# Patient Record
Sex: FEMALE | Race: OTHER | NOT HISPANIC OR LATINO
[De-identification: names, ages, dates, MRNs, and addresses within clinical notes are randomized per-mention and may not be internally consistent; named-entity substitution may affect disease eponyms.]

---

## 2017-06-08 ENCOUNTER — APPOINTMENT (OUTPATIENT)
Dept: PLASTIC SURGERY | Facility: CLINIC | Age: 47
End: 2017-06-08

## 2017-06-22 ENCOUNTER — FORM ENCOUNTER (OUTPATIENT)
Age: 47
End: 2017-06-22

## 2017-06-23 ENCOUNTER — LABORATORY RESULT (OUTPATIENT)
Age: 47
End: 2017-06-23

## 2017-06-23 ENCOUNTER — OUTPATIENT (OUTPATIENT)
Dept: OUTPATIENT SERVICES | Facility: HOSPITAL | Age: 47
LOS: 1 days | End: 2017-06-23
Payer: COMMERCIAL

## 2017-06-23 ENCOUNTER — APPOINTMENT (OUTPATIENT)
Dept: INTERNAL MEDICINE | Facility: CLINIC | Age: 47
End: 2017-06-23

## 2017-06-23 VITALS
WEIGHT: 201 LBS | TEMPERATURE: 98 F | OXYGEN SATURATION: 98 % | DIASTOLIC BLOOD PRESSURE: 80 MMHG | SYSTOLIC BLOOD PRESSURE: 110 MMHG | HEIGHT: 64 IN | HEART RATE: 114 BPM | BODY MASS INDEX: 34.31 KG/M2

## 2017-06-23 DIAGNOSIS — Z01.818 ENCOUNTER FOR OTHER PREPROCEDURAL EXAMINATION: ICD-10-CM

## 2017-06-23 LAB
ALBUMIN SERPL ELPH-MCNC: 4.6 G/DL — SIGNIFICANT CHANGE UP (ref 3.3–5)
ALP SERPL-CCNC: 66 U/L — SIGNIFICANT CHANGE UP (ref 40–120)
ALT FLD-CCNC: 109 U/L — HIGH (ref 10–45)
ANION GAP SERPL CALC-SCNC: 14 MMOL/L — SIGNIFICANT CHANGE UP (ref 5–17)
APPEARANCE UR: CLEAR — SIGNIFICANT CHANGE UP
APTT BLD: 29.1 SEC — SIGNIFICANT CHANGE UP (ref 27.5–37.4)
AST SERPL-CCNC: 68 U/L — HIGH (ref 10–40)
BACTERIA # UR AUTO: PRESENT /HPF
BASOPHILS NFR BLD AUTO: 0.5 % — SIGNIFICANT CHANGE UP (ref 0–2)
BILIRUB SERPL-MCNC: 1.1 MG/DL — SIGNIFICANT CHANGE UP (ref 0.2–1.2)
BILIRUB UR-MCNC: NEGATIVE — SIGNIFICANT CHANGE UP
BUN SERPL-MCNC: 12 MG/DL — SIGNIFICANT CHANGE UP (ref 7–23)
CALCIUM SERPL-MCNC: 10.1 MG/DL — SIGNIFICANT CHANGE UP (ref 8.4–10.5)
CHLORIDE SERPL-SCNC: 102 MMOL/L — SIGNIFICANT CHANGE UP (ref 96–108)
CO2 SERPL-SCNC: 25 MMOL/L — SIGNIFICANT CHANGE UP (ref 22–31)
COLOR SPEC: YELLOW — SIGNIFICANT CHANGE UP
CREAT SERPL-MCNC: 0.6 MG/DL — SIGNIFICANT CHANGE UP (ref 0.5–1.3)
DIFF PNL FLD: (no result)
EOSINOPHIL NFR BLD AUTO: 2.1 % — SIGNIFICANT CHANGE UP (ref 0–6)
EPI CELLS # UR: SIGNIFICANT CHANGE UP /HPF
GLUCOSE SERPL-MCNC: 87 MG/DL — SIGNIFICANT CHANGE UP (ref 70–99)
GLUCOSE UR QL: NEGATIVE — SIGNIFICANT CHANGE UP
HCT VFR BLD CALC: 40.6 % — SIGNIFICANT CHANGE UP (ref 34.5–45)
HGB BLD-MCNC: 13.8 G/DL — SIGNIFICANT CHANGE UP (ref 11.5–15.5)
INR BLD: 1.04 — SIGNIFICANT CHANGE UP (ref 0.88–1.16)
KETONES UR-MCNC: NEGATIVE — SIGNIFICANT CHANGE UP
LEUKOCYTE ESTERASE UR-ACNC: (no result)
LYMPHOCYTES # BLD AUTO: 25.2 % — SIGNIFICANT CHANGE UP (ref 13–44)
MCHC RBC-ENTMCNC: 29.1 PG — SIGNIFICANT CHANGE UP (ref 27–34)
MCHC RBC-ENTMCNC: 34 G/DL — SIGNIFICANT CHANGE UP (ref 32–36)
MCV RBC AUTO: 85.7 FL — SIGNIFICANT CHANGE UP (ref 80–100)
MONOCYTES NFR BLD AUTO: 6.5 % — SIGNIFICANT CHANGE UP (ref 2–14)
NEUTROPHILS NFR BLD AUTO: 65.7 % — SIGNIFICANT CHANGE UP (ref 43–77)
NITRITE UR-MCNC: NEGATIVE — SIGNIFICANT CHANGE UP
PH UR: 5 — SIGNIFICANT CHANGE UP (ref 5–8)
PLATELET # BLD AUTO: 218 K/UL — SIGNIFICANT CHANGE UP (ref 150–400)
POTASSIUM SERPL-MCNC: 3.7 MMOL/L — SIGNIFICANT CHANGE UP (ref 3.5–5.3)
POTASSIUM SERPL-SCNC: 3.7 MMOL/L — SIGNIFICANT CHANGE UP (ref 3.5–5.3)
PROT SERPL-MCNC: 7 G/DL — SIGNIFICANT CHANGE UP (ref 6–8.3)
PROT UR-MCNC: NEGATIVE MG/DL — SIGNIFICANT CHANGE UP
PROTHROM AB SERPL-ACNC: 11.5 SEC — SIGNIFICANT CHANGE UP (ref 9.8–12.7)
RBC # BLD: 4.74 M/UL — SIGNIFICANT CHANGE UP (ref 3.8–5.2)
RBC # FLD: 12.4 % — SIGNIFICANT CHANGE UP (ref 10.3–16.9)
RBC CASTS # UR COMP ASSIST: < 5 /HPF — SIGNIFICANT CHANGE UP
SODIUM SERPL-SCNC: 141 MMOL/L — SIGNIFICANT CHANGE UP (ref 135–145)
SP GR SPEC: >=1.03 — SIGNIFICANT CHANGE UP (ref 1–1.03)
UROBILINOGEN FLD QL: 0.2 E.U./DL — SIGNIFICANT CHANGE UP
WBC # BLD: 6.3 K/UL — SIGNIFICANT CHANGE UP (ref 3.8–10.5)
WBC # FLD AUTO: 6.3 K/UL — SIGNIFICANT CHANGE UP (ref 3.8–10.5)
WBC UR QL: < 5 /HPF — SIGNIFICANT CHANGE UP

## 2017-06-23 PROCEDURE — 71020: CPT | Mod: 26

## 2017-06-23 PROCEDURE — 71046 X-RAY EXAM CHEST 2 VIEWS: CPT

## 2017-06-23 PROCEDURE — 85730 THROMBOPLASTIN TIME PARTIAL: CPT

## 2017-06-23 PROCEDURE — 85610 PROTHROMBIN TIME: CPT

## 2017-06-23 PROCEDURE — 87086 URINE CULTURE/COLONY COUNT: CPT

## 2017-06-23 PROCEDURE — 80053 COMPREHEN METABOLIC PANEL: CPT

## 2017-06-23 PROCEDURE — 85025 COMPLETE CBC W/AUTO DIFF WBC: CPT

## 2017-06-23 PROCEDURE — 81001 URINALYSIS AUTO W/SCOPE: CPT

## 2017-06-25 LAB
CULTURE RESULTS: NO GROWTH — SIGNIFICANT CHANGE UP
SPECIMEN SOURCE: SIGNIFICANT CHANGE UP

## 2017-06-26 LAB
APPEARANCE: ABNORMAL
BILIRUBIN URINE: NEGATIVE
BLOOD URINE: NEGATIVE
COLOR: YELLOW
GLUCOSE QUALITATIVE U: NORMAL MG/DL
KETONES URINE: NEGATIVE
LEUKOCYTE ESTERASE URINE: ABNORMAL
NITRITE URINE: NEGATIVE
PH URINE: 5.5
PROTEIN URINE: NEGATIVE MG/DL
SPECIFIC GRAVITY URINE: 1.02
UROBILINOGEN URINE: 1 MG/DL

## 2017-06-28 VITALS
OXYGEN SATURATION: 98 % | RESPIRATION RATE: 16 BRPM | DIASTOLIC BLOOD PRESSURE: 54 MMHG | HEART RATE: 86 BPM | HEIGHT: 63 IN | SYSTOLIC BLOOD PRESSURE: 103 MMHG | WEIGHT: 192.02 LBS | TEMPERATURE: 99 F

## 2017-06-28 RX ORDER — OXYCODONE HYDROCHLORIDE 5 MG/1
20 TABLET ORAL ONCE
Qty: 0 | Refills: 0 | Status: DISCONTINUED | OUTPATIENT
Start: 2017-06-29 | End: 2017-06-29

## 2017-06-28 RX ORDER — CELECOXIB 200 MG/1
400 CAPSULE ORAL ONCE
Qty: 0 | Refills: 0 | Status: COMPLETED | OUTPATIENT
Start: 2017-06-29 | End: 2017-06-29

## 2017-06-28 NOTE — H&P ADULT - NSHPLABSRESULTS_GEN_ALL_CORE
Preop CBC, BMP, PT/PTT/INR, UA (+UA, negative culture) - WNL per medical clearance   Preop EKG - WNL per medical clearance   Preop CXR - no acute pathology - WNL per medical clearance

## 2017-06-28 NOTE — H&P ADULT - PMH
Arthritis of right knee    Breast cancer  right Arthritis of right knee    Breast cancer  right  H/O psoriasis

## 2017-06-28 NOTE — H&P ADULT - PROBLEM SELECTOR PLAN 1
Admit to Orthopaedic Service.  Presents today for elective right TKR  Pt medically stable and cleared for procedure today by Dr. Toro

## 2017-06-28 NOTE — H&P ADULT - HISTORY OF PRESENT ILLNESS
47F c/o right knee pain x       Present for elective right total knee replacement today 47F c/o right knee pain x chronic      Present for elective right total knee replacement today. Independent ambulator.  No complaints. Uses one crutch to assist ambulation.

## 2017-06-28 NOTE — H&P ADULT - NSHPPHYSICALEXAM_GEN_ALL_CORE
MSK: Decreased ROM secondary to pain, right knee      Remainder of exam per medical clearance note MSK: Decreased ROM secondary to pain, right knee  ehl, tib ant, GS 5/5 b/l LE  DP palpable b/l  Calf soft, compressible, NT b/l  gross sensation intact b/l LE  psoriasis lesion right leg      Remainder of exam per medical clearance note

## 2017-06-29 ENCOUNTER — INPATIENT (INPATIENT)
Facility: HOSPITAL | Age: 47
LOS: 1 days | Discharge: HOME HEALTH | DRG: 470 | End: 2017-07-01
Attending: ORTHOPAEDIC SURGERY | Admitting: ORTHOPAEDIC SURGERY
Payer: COMMERCIAL

## 2017-06-29 ENCOUNTER — RESULT REVIEW (OUTPATIENT)
Age: 47
End: 2017-06-29

## 2017-06-29 DIAGNOSIS — Z90.722 ACQUIRED ABSENCE OF OVARIES, BILATERAL: Chronic | ICD-10-CM

## 2017-06-29 DIAGNOSIS — M17.11 UNILATERAL PRIMARY OSTEOARTHRITIS, RIGHT KNEE: ICD-10-CM

## 2017-06-29 DIAGNOSIS — Z90.10 ACQUIRED ABSENCE OF UNSPECIFIED BREAST AND NIPPLE: Chronic | ICD-10-CM

## 2017-06-29 LAB
HCT VFR BLD CALC: 33.9 % — LOW (ref 34.5–45)
HGB BLD-MCNC: 11.6 G/DL — SIGNIFICANT CHANGE UP (ref 11.5–15.5)
MCHC RBC-ENTMCNC: 29.6 PG — SIGNIFICANT CHANGE UP (ref 27–34)
MCHC RBC-ENTMCNC: 34.2 G/DL — SIGNIFICANT CHANGE UP (ref 32–36)
MCV RBC AUTO: 86.5 FL — SIGNIFICANT CHANGE UP (ref 80–100)
PLATELET # BLD AUTO: 173 K/UL — SIGNIFICANT CHANGE UP (ref 150–400)
RBC # BLD: 3.92 M/UL — SIGNIFICANT CHANGE UP (ref 3.8–5.2)
RBC # FLD: 12.7 % — SIGNIFICANT CHANGE UP (ref 10.3–16.9)
WBC # BLD: 5.9 K/UL — SIGNIFICANT CHANGE UP (ref 3.8–10.5)
WBC # FLD AUTO: 5.9 K/UL — SIGNIFICANT CHANGE UP (ref 3.8–10.5)

## 2017-06-29 PROCEDURE — 73560 X-RAY EXAM OF KNEE 1 OR 2: CPT | Mod: 26,RT

## 2017-06-29 RX ORDER — ASPIRIN/CALCIUM CARB/MAGNESIUM 324 MG
325 TABLET ORAL
Qty: 0 | Refills: 0 | Status: DISCONTINUED | OUTPATIENT
Start: 2017-06-29 | End: 2017-07-01

## 2017-06-29 RX ORDER — BUPIVACAINE 13.3 MG/ML
20 INJECTION, SUSPENSION, LIPOSOMAL INFILTRATION ONCE
Qty: 0 | Refills: 0 | Status: DISCONTINUED | OUTPATIENT
Start: 2017-06-29 | End: 2017-07-01

## 2017-06-29 RX ORDER — SENNA PLUS 8.6 MG/1
2 TABLET ORAL AT BEDTIME
Qty: 0 | Refills: 0 | Status: DISCONTINUED | OUTPATIENT
Start: 2017-06-29 | End: 2017-07-01

## 2017-06-29 RX ORDER — OXYCODONE HYDROCHLORIDE 5 MG/1
10 TABLET ORAL EVERY 4 HOURS
Qty: 0 | Refills: 0 | Status: DISCONTINUED | OUTPATIENT
Start: 2017-06-29 | End: 2017-07-01

## 2017-06-29 RX ORDER — POLYETHYLENE GLYCOL 3350 17 G/17G
17 POWDER, FOR SOLUTION ORAL DAILY
Qty: 0 | Refills: 0 | Status: DISCONTINUED | OUTPATIENT
Start: 2017-06-29 | End: 2017-07-01

## 2017-06-29 RX ORDER — VANCOMYCIN HCL 1 G
1250 VIAL (EA) INTRAVENOUS ONCE
Qty: 0 | Refills: 0 | Status: COMPLETED | OUTPATIENT
Start: 2017-06-29 | End: 2017-06-29

## 2017-06-29 RX ORDER — ACETAMINOPHEN 500 MG
650 TABLET ORAL EVERY 6 HOURS
Qty: 0 | Refills: 0 | Status: DISCONTINUED | OUTPATIENT
Start: 2017-06-29 | End: 2017-07-01

## 2017-06-29 RX ORDER — DOCUSATE SODIUM 100 MG
100 CAPSULE ORAL THREE TIMES A DAY
Qty: 0 | Refills: 0 | Status: DISCONTINUED | OUTPATIENT
Start: 2017-06-29 | End: 2017-07-01

## 2017-06-29 RX ORDER — MORPHINE SULFATE 50 MG/1
4 CAPSULE, EXTENDED RELEASE ORAL EVERY 4 HOURS
Qty: 0 | Refills: 0 | Status: DISCONTINUED | OUTPATIENT
Start: 2017-06-29 | End: 2017-06-29

## 2017-06-29 RX ORDER — MORPHINE SULFATE 50 MG/1
4 CAPSULE, EXTENDED RELEASE ORAL
Qty: 0 | Refills: 0 | Status: DISCONTINUED | OUTPATIENT
Start: 2017-06-29 | End: 2017-06-29

## 2017-06-29 RX ORDER — CELECOXIB 200 MG/1
200 CAPSULE ORAL
Qty: 0 | Refills: 0 | Status: DISCONTINUED | OUTPATIENT
Start: 2017-06-30 | End: 2017-07-01

## 2017-06-29 RX ORDER — KETOROLAC TROMETHAMINE 30 MG/ML
30 SYRINGE (ML) INJECTION EVERY 6 HOURS
Qty: 0 | Refills: 0 | Status: DISCONTINUED | OUTPATIENT
Start: 2017-06-29 | End: 2017-06-29

## 2017-06-29 RX ORDER — OXYCODONE HYDROCHLORIDE 5 MG/1
10 TABLET ORAL EVERY 12 HOURS
Qty: 0 | Refills: 0 | Status: DISCONTINUED | OUTPATIENT
Start: 2017-06-29 | End: 2017-07-01

## 2017-06-29 RX ORDER — ONDANSETRON 8 MG/1
4 TABLET, FILM COATED ORAL EVERY 6 HOURS
Qty: 0 | Refills: 0 | Status: DISCONTINUED | OUTPATIENT
Start: 2017-06-29 | End: 2017-07-01

## 2017-06-29 RX ORDER — ACETAMINOPHEN 500 MG
650 TABLET ORAL EVERY 6 HOURS
Qty: 0 | Refills: 0 | Status: DISCONTINUED | OUTPATIENT
Start: 2017-06-29 | End: 2017-06-30

## 2017-06-29 RX ORDER — LETROZOLE 2.5 MG/1
2.5 TABLET, FILM COATED ORAL DAILY
Qty: 0 | Refills: 0 | Status: DISCONTINUED | OUTPATIENT
Start: 2017-06-29 | End: 2017-06-29

## 2017-06-29 RX ORDER — MAGNESIUM HYDROXIDE 400 MG/1
30 TABLET, CHEWABLE ORAL DAILY
Qty: 0 | Refills: 0 | Status: DISCONTINUED | OUTPATIENT
Start: 2017-06-29 | End: 2017-07-01

## 2017-06-29 RX ORDER — HYDROMORPHONE HYDROCHLORIDE 2 MG/ML
0.5 INJECTION INTRAMUSCULAR; INTRAVENOUS; SUBCUTANEOUS EVERY 4 HOURS
Qty: 0 | Refills: 0 | Status: DISCONTINUED | OUTPATIENT
Start: 2017-06-29 | End: 2017-07-01

## 2017-06-29 RX ORDER — BENZOCAINE AND MENTHOL 5; 1 G/100ML; G/100ML
1 LIQUID ORAL THREE TIMES A DAY
Qty: 0 | Refills: 0 | Status: DISCONTINUED | OUTPATIENT
Start: 2017-06-29 | End: 2017-07-01

## 2017-06-29 RX ORDER — SODIUM CHLORIDE 9 MG/ML
1000 INJECTION, SOLUTION INTRAVENOUS
Qty: 0 | Refills: 0 | Status: DISCONTINUED | OUTPATIENT
Start: 2017-06-29 | End: 2017-07-01

## 2017-06-29 RX ORDER — OXYCODONE HYDROCHLORIDE 5 MG/1
5 TABLET ORAL EVERY 4 HOURS
Qty: 0 | Refills: 0 | Status: DISCONTINUED | OUTPATIENT
Start: 2017-06-29 | End: 2017-07-01

## 2017-06-29 RX ADMIN — OXYCODONE HYDROCHLORIDE 20 MILLIGRAM(S): 5 TABLET ORAL at 06:58

## 2017-06-29 RX ADMIN — Medication 100 MILLIGRAM(S): at 21:22

## 2017-06-29 RX ADMIN — POLYETHYLENE GLYCOL 3350 17 GRAM(S): 17 POWDER, FOR SOLUTION ORAL at 17:37

## 2017-06-29 RX ADMIN — OXYCODONE HYDROCHLORIDE 10 MILLIGRAM(S): 5 TABLET ORAL at 16:33

## 2017-06-29 RX ADMIN — HYDROMORPHONE HYDROCHLORIDE 0.5 MILLIGRAM(S): 2 INJECTION INTRAMUSCULAR; INTRAVENOUS; SUBCUTANEOUS at 23:41

## 2017-06-29 RX ADMIN — Medication 30 MILLIGRAM(S): at 23:41

## 2017-06-29 RX ADMIN — Medication 166.67 MILLIGRAM(S): at 18:28

## 2017-06-29 RX ADMIN — Medication 30 MILLIGRAM(S): at 17:29

## 2017-06-29 RX ADMIN — Medication 30 MILLIGRAM(S): at 12:29

## 2017-06-29 RX ADMIN — OXYCODONE HYDROCHLORIDE 10 MILLIGRAM(S): 5 TABLET ORAL at 22:17

## 2017-06-29 RX ADMIN — OXYCODONE HYDROCHLORIDE 10 MILLIGRAM(S): 5 TABLET ORAL at 21:22

## 2017-06-29 RX ADMIN — Medication 325 MILLIGRAM(S): at 17:29

## 2017-06-29 RX ADMIN — OXYCODONE HYDROCHLORIDE 10 MILLIGRAM(S): 5 TABLET ORAL at 16:03

## 2017-06-29 RX ADMIN — Medication 30 MILLIGRAM(S): at 17:45

## 2017-06-29 RX ADMIN — OXYCODONE HYDROCHLORIDE 10 MILLIGRAM(S): 5 TABLET ORAL at 17:29

## 2017-06-29 RX ADMIN — Medication 1 TABLET(S): at 17:37

## 2017-06-29 RX ADMIN — Medication 30 MILLIGRAM(S): at 23:26

## 2017-06-29 RX ADMIN — HYDROMORPHONE HYDROCHLORIDE 0.5 MILLIGRAM(S): 2 INJECTION INTRAMUSCULAR; INTRAVENOUS; SUBCUTANEOUS at 19:27

## 2017-06-29 RX ADMIN — HYDROMORPHONE HYDROCHLORIDE 0.5 MILLIGRAM(S): 2 INJECTION INTRAMUSCULAR; INTRAVENOUS; SUBCUTANEOUS at 23:27

## 2017-06-29 RX ADMIN — OXYCODONE HYDROCHLORIDE 10 MILLIGRAM(S): 5 TABLET ORAL at 17:45

## 2017-06-29 RX ADMIN — ONDANSETRON 4 MILLIGRAM(S): 8 TABLET, FILM COATED ORAL at 11:23

## 2017-06-29 RX ADMIN — CELECOXIB 400 MILLIGRAM(S): 200 CAPSULE ORAL at 06:58

## 2017-06-29 RX ADMIN — Medication 30 MILLIGRAM(S): at 13:30

## 2017-06-29 NOTE — PROGRESS NOTE ADULT - SUBJECTIVE AND OBJECTIVE BOX
Orthopaedics Post Op Check Note    Procedure: Right Total knee replacment  Surgeon:Dr. Saavedra    Pt comfortable, without complaints  Denies CP, SOB, N/V, numbness/tingling . Has voided    Vital Signs Last 24 Hrs  T(C): 36.7 (29 Jun 2017 11:15), Max: 36.9 (29 Jun 2017 10:20)  T(F): 98 (29 Jun 2017 11:15), Max: 98.4 (29 Jun 2017 10:20)  HR: 59 (29 Jun 2017 13:30) (56 - 82)  BP: 117/66 (29 Jun 2017 13:30) (108/62 - 130/70)  BP(mean): --  RR: 20 (29 Jun 2017 13:30) (6 - 20)  SpO2: 99% (29 Jun 2017 13:30) (95% - 100%)    Dressing C/D/I. Cold therapy intact  General: Pt Alert and oriented     Pulses: DP palpable b/l  Sensation: gross sensation intact b/l LE  Motor: ehl, tib ant, GS 5/5  Abd: soft, NT, ND    Other: calf soft, compressible b/l    Post Op labs:                              11.6   5.9   )-----------( 173      ( 29 Jun 2017 11:28 )             33.9       Post op XR: prosthesis intact    A/P: 47yFemale POD#0 s/p   - Stable  - Pain Control  - DVT ppx: ASA, SCDs, OOB/PT  - Tori op abx: VAnco  (pcn allergy)  - PT, WBS: WBAT  - F/U AM Labs  -IVF    Ortho Consult Pager: [phone]

## 2017-06-29 NOTE — PHYSICAL THERAPY INITIAL EVALUATION ADULT - CRITERIA FOR SKILLED THERAPEUTIC INTERVENTIONS
therapy frequency/anticipated discharge recommendation/anticipated equipment needs at discharge/impairments found/rehab potential

## 2017-06-30 ENCOUNTER — TRANSCRIPTION ENCOUNTER (OUTPATIENT)
Age: 47
End: 2017-06-30

## 2017-06-30 LAB
ANION GAP SERPL CALC-SCNC: 8 MMOL/L — SIGNIFICANT CHANGE UP (ref 5–17)
BUN SERPL-MCNC: 9 MG/DL — SIGNIFICANT CHANGE UP (ref 7–23)
CALCIUM SERPL-MCNC: 8.5 MG/DL — SIGNIFICANT CHANGE UP (ref 8.4–10.5)
CHLORIDE SERPL-SCNC: 104 MMOL/L — SIGNIFICANT CHANGE UP (ref 96–108)
CO2 SERPL-SCNC: 28 MMOL/L — SIGNIFICANT CHANGE UP (ref 22–31)
CREAT SERPL-MCNC: 0.7 MG/DL — SIGNIFICANT CHANGE UP (ref 0.5–1.3)
GLUCOSE SERPL-MCNC: 165 MG/DL — HIGH (ref 70–99)
HCT VFR BLD CALC: 32.9 % — LOW (ref 34.5–45)
HGB BLD-MCNC: 10.9 G/DL — LOW (ref 11.5–15.5)
MCHC RBC-ENTMCNC: 29.2 PG — SIGNIFICANT CHANGE UP (ref 27–34)
MCHC RBC-ENTMCNC: 33.1 G/DL — SIGNIFICANT CHANGE UP (ref 32–36)
MCV RBC AUTO: 88.2 FL — SIGNIFICANT CHANGE UP (ref 80–100)
PLATELET # BLD AUTO: 157 K/UL — SIGNIFICANT CHANGE UP (ref 150–400)
POTASSIUM SERPL-MCNC: 4.2 MMOL/L — SIGNIFICANT CHANGE UP (ref 3.5–5.3)
POTASSIUM SERPL-SCNC: 4.2 MMOL/L — SIGNIFICANT CHANGE UP (ref 3.5–5.3)
RBC # BLD: 3.73 M/UL — LOW (ref 3.8–5.2)
RBC # FLD: 12.8 % — SIGNIFICANT CHANGE UP (ref 10.3–16.9)
SODIUM SERPL-SCNC: 140 MMOL/L — SIGNIFICANT CHANGE UP (ref 135–145)
WBC # BLD: 5.7 K/UL — SIGNIFICANT CHANGE UP (ref 3.8–10.5)
WBC # FLD AUTO: 5.7 K/UL — SIGNIFICANT CHANGE UP (ref 3.8–10.5)

## 2017-06-30 RX ORDER — KETOROLAC TROMETHAMINE 30 MG/ML
30 SYRINGE (ML) INJECTION EVERY 6 HOURS
Qty: 0 | Refills: 0 | Status: DISCONTINUED | OUTPATIENT
Start: 2017-06-30 | End: 2017-07-01

## 2017-06-30 RX ORDER — DOCUSATE SODIUM 100 MG
1 CAPSULE ORAL
Qty: 0 | Refills: 0 | COMMUNITY
Start: 2017-06-30

## 2017-06-30 RX ORDER — LETROZOLE 2.5 MG/1
1 TABLET, FILM COATED ORAL
Qty: 0 | Refills: 0 | COMMUNITY

## 2017-06-30 RX ORDER — POLYETHYLENE GLYCOL 3350 17 G/17G
17 POWDER, FOR SOLUTION ORAL
Qty: 0 | Refills: 0 | COMMUNITY
Start: 2017-06-30

## 2017-06-30 RX ORDER — ASPIRIN/CALCIUM CARB/MAGNESIUM 324 MG
1 TABLET ORAL
Qty: 0 | Refills: 0 | COMMUNITY
Start: 2017-06-30

## 2017-06-30 RX ORDER — SENNA PLUS 8.6 MG/1
2 TABLET ORAL
Qty: 0 | Refills: 0 | COMMUNITY
Start: 2017-06-30

## 2017-06-30 RX ORDER — ACETAMINOPHEN 500 MG
975 TABLET ORAL EVERY 8 HOURS
Qty: 0 | Refills: 0 | Status: DISCONTINUED | OUTPATIENT
Start: 2017-06-30 | End: 2017-07-01

## 2017-06-30 RX ADMIN — HYDROMORPHONE HYDROCHLORIDE 0.5 MILLIGRAM(S): 2 INJECTION INTRAMUSCULAR; INTRAVENOUS; SUBCUTANEOUS at 09:44

## 2017-06-30 RX ADMIN — Medication 325 MILLIGRAM(S): at 17:02

## 2017-06-30 RX ADMIN — Medication 30 MILLIGRAM(S): at 17:18

## 2017-06-30 RX ADMIN — Medication 100 MILLIGRAM(S): at 21:39

## 2017-06-30 RX ADMIN — Medication 1 TABLET(S): at 11:16

## 2017-06-30 RX ADMIN — OXYCODONE HYDROCHLORIDE 10 MILLIGRAM(S): 5 TABLET ORAL at 18:03

## 2017-06-30 RX ADMIN — Medication 975 MILLIGRAM(S): at 10:19

## 2017-06-30 RX ADMIN — OXYCODONE HYDROCHLORIDE 10 MILLIGRAM(S): 5 TABLET ORAL at 22:23

## 2017-06-30 RX ADMIN — HYDROMORPHONE HYDROCHLORIDE 0.5 MILLIGRAM(S): 2 INJECTION INTRAMUSCULAR; INTRAVENOUS; SUBCUTANEOUS at 10:01

## 2017-06-30 RX ADMIN — CELECOXIB 200 MILLIGRAM(S): 200 CAPSULE ORAL at 09:49

## 2017-06-30 RX ADMIN — Medication 100 MILLIGRAM(S): at 05:17

## 2017-06-30 RX ADMIN — Medication 30 MILLIGRAM(S): at 10:34

## 2017-06-30 RX ADMIN — OXYCODONE HYDROCHLORIDE 10 MILLIGRAM(S): 5 TABLET ORAL at 08:55

## 2017-06-30 RX ADMIN — OXYCODONE HYDROCHLORIDE 10 MILLIGRAM(S): 5 TABLET ORAL at 20:00

## 2017-06-30 RX ADMIN — Medication 325 MILLIGRAM(S): at 05:17

## 2017-06-30 RX ADMIN — OXYCODONE HYDROCHLORIDE 10 MILLIGRAM(S): 5 TABLET ORAL at 13:44

## 2017-06-30 RX ADMIN — OXYCODONE HYDROCHLORIDE 10 MILLIGRAM(S): 5 TABLET ORAL at 21:39

## 2017-06-30 RX ADMIN — OXYCODONE HYDROCHLORIDE 10 MILLIGRAM(S): 5 TABLET ORAL at 17:02

## 2017-06-30 RX ADMIN — Medication 975 MILLIGRAM(S): at 22:23

## 2017-06-30 RX ADMIN — Medication 650 MILLIGRAM(S): at 05:17

## 2017-06-30 RX ADMIN — OXYCODONE HYDROCHLORIDE 10 MILLIGRAM(S): 5 TABLET ORAL at 19:02

## 2017-06-30 RX ADMIN — OXYCODONE HYDROCHLORIDE 10 MILLIGRAM(S): 5 TABLET ORAL at 05:43

## 2017-06-30 RX ADMIN — CELECOXIB 200 MILLIGRAM(S): 200 CAPSULE ORAL at 08:49

## 2017-06-30 RX ADMIN — OXYCODONE HYDROCHLORIDE 10 MILLIGRAM(S): 5 TABLET ORAL at 04:30

## 2017-06-30 RX ADMIN — OXYCODONE HYDROCHLORIDE 10 MILLIGRAM(S): 5 TABLET ORAL at 06:37

## 2017-06-30 RX ADMIN — OXYCODONE HYDROCHLORIDE 10 MILLIGRAM(S): 5 TABLET ORAL at 12:44

## 2017-06-30 RX ADMIN — OXYCODONE HYDROCHLORIDE 10 MILLIGRAM(S): 5 TABLET ORAL at 03:55

## 2017-06-30 RX ADMIN — Medication 30 MILLIGRAM(S): at 10:19

## 2017-06-30 RX ADMIN — Medication 100 MILLIGRAM(S): at 11:16

## 2017-06-30 RX ADMIN — Medication 30 MILLIGRAM(S): at 23:30

## 2017-06-30 RX ADMIN — Medication 30 MILLIGRAM(S): at 23:45

## 2017-06-30 RX ADMIN — Medication 975 MILLIGRAM(S): at 21:39

## 2017-06-30 RX ADMIN — POLYETHYLENE GLYCOL 3350 17 GRAM(S): 17 POWDER, FOR SOLUTION ORAL at 11:16

## 2017-06-30 RX ADMIN — OXYCODONE HYDROCHLORIDE 10 MILLIGRAM(S): 5 TABLET ORAL at 07:55

## 2017-06-30 RX ADMIN — Medication 30 MILLIGRAM(S): at 17:02

## 2017-06-30 RX ADMIN — Medication 975 MILLIGRAM(S): at 11:19

## 2017-06-30 RX ADMIN — Medication 650 MILLIGRAM(S): at 06:17

## 2017-06-30 NOTE — DISCHARGE NOTE ADULT - CARE PLAN
Principal Discharge DX:	Arthritis of right knee  Goal:	Improved ambulation and decreased pain after right total knee replacement 6/29/17  Instructions for follow-up, activity and diet:	Weight bearing as tolerated on right leg with assistive device (rolling walker).  No strenuous activity, heavy lifting, driving, tub bathing, or returning to work until cleared by MD.  You may shower--dressing is waterproof.  Remove dressing after post op day 7, then leave incision open to air.  Follow up with Dr. Saavedra to schedule an appt within 10-14 days.  If you don't have a bowel movement by post op day 3, then take Milk of Magnesia (over the counter).  If no bowel movement by at least post op day 5, then use a Dulcolax suppository (over the counter) and/or a Fleets enema--if still no bowel movement, call your MD.  Contact your doctor if you experience: fever greater than 101.5, chills, chest pain, difficulty breathing, bleeding, redness or heat around the incision.

## 2017-06-30 NOTE — PROGRESS NOTE ADULT - SUBJECTIVE AND OBJECTIVE BOX
Did well o/n. Doing well this AM. Mild pain.     Procedure: Right Total knee replacment  Surgeon:Dr. Saavedra    Pt comfortable, without complaints  Denies CP, SOB, N/V, numbness/tingling . Has voided    Vital Signs Last 24 Hrs  T(C): 37.3 (30 Jun 2017 05:07), Max: 37.3 (30 Jun 2017 05:07)  T(F): 99.1 (30 Jun 2017 05:07), Max: 99.1 (30 Jun 2017 05:07)  HR: 94 (30 Jun 2017 05:07) (56 - 94)  BP: 102/70 (30 Jun 2017 05:41) (96/66 - 130/70)  BP(mean): --  RR: 17 (30 Jun 2017 05:07) (6 - 20)  SpO2: 100% (30 Jun 2017 05:07) (95% - 100%)    Dressing C/D/I. Cold therapy intact  General: Pt Alert and oriented   Pulses: DP palpable b/l  Sensation: gross sensation intact b/l LE  Motor: ehl, tib ant, GS 5/5  Abd: soft, NT, ND    Other: calf soft, compressible b/l    A/P: 47yFemale POD#1 s/p   - Stable  - Pain Control  - DVT ppx: ASA, SCDs, OOB/PT  - Tori op abx: VAnco  (pcn allergy)  - PT, WBS: WBAT  - F/U AM Labs  -IVF    Ortho Consult Pager: [phone]

## 2017-06-30 NOTE — PROGRESS NOTE ADULT - SUBJECTIVE AND OBJECTIVE BOX
ORTHO NOTE    [x ] Pt seen/examined.  [ ] Pt without any complaints/in NAD.    [x ] Pt complains of: right knee pain      ROS: [ ] Fever  [ ] Chills  [ ] CP [ ] SOB [ ] Dysnea  [ ] Palpitations [ ] Cough [ ] N/V/C/D [ ] Paresthia [ ] Other     [x ] ROS  otherwise negative    .    PHYSICAL EXAM:    Vital Signs Last 24 Hrs  T(C): 37.3 (30 Jun 2017 05:07), Max: 37.3 (30 Jun 2017 05:07)  T(F): 99.1 (30 Jun 2017 05:07), Max: 99.1 (30 Jun 2017 05:07)  HR: 94 (30 Jun 2017 05:07) (56 - 94)  BP: 102/70 (30 Jun 2017 05:41) (96/66 - 130/70)  BP(mean): --  RR: 17 (30 Jun 2017 05:07) (8 - 20)  SpO2: 100% (30 Jun 2017 05:07) (95% - 100%)    I&O's Detail    29 Jun 2017 07:01  -  30 Jun 2017 07:00  --------------------------------------------------------  IN:    lactated ringers.: 500 mL  Total IN: 500 mL    OUT:    Voided: 1000 mL  Total OUT: 1000 mL    Total NET: -500 mL           CAPILLARY BLOOD GLUCOSE                      Neuro: AAOX3    Lungs: CTA, IS demonstrated    CV:    ABD: soft, nontender, bowel regimen    Ext: right knee bulky dsg cdi, right LE ice cryocuff implemented, right LE NVID strength 5/5    LABS                        10.9   5.7   )-----------( 157      ( 30 Jun 2017 05:39 )             32.9                                06-30    140  |  104  |  9   ----------------------------<  165<H>  4.2   |  28  |  0.70    Ca    8.5      30 Jun 2017 05:40        [ ] Other Labs  [ ] None ordered            Please check or [place] when present:  •  Heart Failure:    [ ] Acute        [ ]  Acute on Chronic        [ ] Chronic         [ ] Diastolic     [ ]  Combined    •  YOSSI:     [ ] ATN        [ ]  Renal medullary necrosis       [ ]  Renal cortical necrosis                  [ ] Other pathological Lesion:  •  CKD:  [ ] Stage I   [ ] Stage II  [ ] Stage III    [ ]Stage IV   [ ]  CKD V   [ ]  Other/Unspecified:    •  Abdominal Nutritional Status:   [ ] Malnutrition-See Nutrition note    [ ] Cachexia   [ ]  Other        [ ] Supplement ordered:            [ ] Morbid Obesity: BMI >=40         ASSESSMENT/PLAN:      STATUS POST: R TKA pod1    CONTINUE:          [ ] PT- WBAT    [ ] DVT PPX- scd boots, ASA bid    [ ] Pain Mgt- po meds, toradol added for pain control    [ ] Dispo plan- home, HPT

## 2017-06-30 NOTE — DISCHARGE NOTE ADULT - CARE PROVIDER_API CALL
Hugo Saavedra), Orthopaedic Surgery  [address] [place] [address]  Phone: [phone]  Fax: [phone]

## 2017-06-30 NOTE — DISCHARGE NOTE ADULT - PLAN OF CARE
Improved ambulation and decreased pain after right total knee replacement 6/29/17 Weight bearing as tolerated on right leg with assistive device (rolling walker).  No strenuous activity, heavy lifting, driving, tub bathing, or returning to work until cleared by MD.  You may shower--dressing is waterproof.  Remove dressing after post op day 7, then leave incision open to air.  Follow up with Dr. Saavedra to schedule an appt within 10-14 days.  If you don't have a bowel movement by post op day 3, then take Milk of Magnesia (over the counter).  If no bowel movement by at least post op day 5, then use a Dulcolax suppository (over the counter) and/or a Fleets enema--if still no bowel movement, call your MD.  Contact your doctor if you experience: fever greater than 101.5, chills, chest pain, difficulty breathing, bleeding, redness or heat around the incision.

## 2017-06-30 NOTE — DISCHARGE NOTE ADULT - PATIENT PORTAL LINK FT
“You can access the [employer] Patient Portal, offered by [facility], by registering with the following website: [email or web address]”

## 2017-06-30 NOTE — DISCHARGE NOTE ADULT - MEDICATION SUMMARY - MEDICATIONS TO TAKE
I will START or STAY ON the medications listed below when I get home from the hospital:    aspirin 325 mg oral delayed release tablet  -- 1 tab(s) by mouth 2 times a day  Take for 4 weeks after surgery to prevent blood clots.  OVER THE COUNTER MEDICINE  -- Indication: For Anti coag    Percocet 5/325 oral tablet  -- 2 tab(s) by mouth every 4 hours MDD:1-2 Tabs PO Q4-6 hrs PRN Pain  -- Caution federal law prohibits the transfer of this drug to any person other  than the person for whom it was prescribed.  May cause drowsiness.  Alcohol may intensify this effect.  Use care when operating dangerous machinery.  This prescription cannot be refilled.  This product contains acetaminophen.  Do not use  with any other product containing acetaminophen to prevent possible liver damage.  Using more of this medication than prescribed may cause serious breathing problems.    -- Indication: For pain    Femara 2.5 mg oral tablet  -- 1 tab(s) by mouth once a day  Do not restart until approved by Dr. Saavedra  -- Indication: For Home    bisacodyl 10 mg rectal suppository  -- 1 suppository(ies) rectally once a day, As needed, If no bowel movement by postoperative day #2  -- Indication: For stool softner    docusate sodium 100 mg oral capsule  -- 1 cap(s) by mouth 3 times a day  -- Indication: For stool softner    polyethylene glycol 3350 oral powder for reconstitution  -- 17 gram(s) by mouth once a day  -- Indication: For stool sftner    senna oral tablet  -- 2 tab(s) by mouth once a day (at bedtime), As needed, Constipation  -- Indication: For stool softner

## 2017-07-01 VITALS — OXYGEN SATURATION: 95 % | DIASTOLIC BLOOD PRESSURE: 50 MMHG | SYSTOLIC BLOOD PRESSURE: 99 MMHG | HEART RATE: 98 BPM

## 2017-07-01 LAB
ANION GAP SERPL CALC-SCNC: 10 MMOL/L — SIGNIFICANT CHANGE UP (ref 5–17)
BUN SERPL-MCNC: 12 MG/DL — SIGNIFICANT CHANGE UP (ref 7–23)
CALCIUM SERPL-MCNC: 8.7 MG/DL — SIGNIFICANT CHANGE UP (ref 8.4–10.5)
CHLORIDE SERPL-SCNC: 108 MMOL/L — SIGNIFICANT CHANGE UP (ref 96–108)
CO2 SERPL-SCNC: 25 MMOL/L — SIGNIFICANT CHANGE UP (ref 22–31)
CREAT SERPL-MCNC: 0.7 MG/DL — SIGNIFICANT CHANGE UP (ref 0.5–1.3)
GLUCOSE SERPL-MCNC: 115 MG/DL — HIGH (ref 70–99)
HCT VFR BLD CALC: 32.7 % — LOW (ref 34.5–45)
HGB BLD-MCNC: 10.6 G/DL — LOW (ref 11.5–15.5)
MCHC RBC-ENTMCNC: 28.7 PG — SIGNIFICANT CHANGE UP (ref 27–34)
MCHC RBC-ENTMCNC: 32.4 G/DL — SIGNIFICANT CHANGE UP (ref 32–36)
MCV RBC AUTO: 88.6 FL — SIGNIFICANT CHANGE UP (ref 80–100)
MRSA PCR RESULT.: NEGATIVE — SIGNIFICANT CHANGE UP
PLATELET # BLD AUTO: 149 K/UL — LOW (ref 150–400)
POTASSIUM SERPL-MCNC: 4 MMOL/L — SIGNIFICANT CHANGE UP (ref 3.5–5.3)
POTASSIUM SERPL-SCNC: 4 MMOL/L — SIGNIFICANT CHANGE UP (ref 3.5–5.3)
RBC # BLD: 3.69 M/UL — LOW (ref 3.8–5.2)
RBC # FLD: 12.8 % — SIGNIFICANT CHANGE UP (ref 10.3–16.9)
S AUREUS DNA NOSE QL NAA+PROBE: POSITIVE
SODIUM SERPL-SCNC: 143 MMOL/L — SIGNIFICANT CHANGE UP (ref 135–145)
WBC # BLD: 4.6 K/UL — SIGNIFICANT CHANGE UP (ref 3.8–10.5)
WBC # FLD AUTO: 4.6 K/UL — SIGNIFICANT CHANGE UP (ref 3.8–10.5)

## 2017-07-01 PROCEDURE — 85027 COMPLETE CBC AUTOMATED: CPT

## 2017-07-01 PROCEDURE — 87641 MR-STAPH DNA AMP PROBE: CPT

## 2017-07-01 PROCEDURE — 88300 SURGICAL PATH GROSS: CPT

## 2017-07-01 PROCEDURE — 97116 GAIT TRAINING THERAPY: CPT

## 2017-07-01 PROCEDURE — 80048 BASIC METABOLIC PNL TOTAL CA: CPT

## 2017-07-01 PROCEDURE — 86901 BLOOD TYPING SEROLOGIC RH(D): CPT

## 2017-07-01 PROCEDURE — 36415 COLL VENOUS BLD VENIPUNCTURE: CPT

## 2017-07-01 PROCEDURE — C1713: CPT

## 2017-07-01 PROCEDURE — 86900 BLOOD TYPING SEROLOGIC ABO: CPT

## 2017-07-01 PROCEDURE — 73560 X-RAY EXAM OF KNEE 1 OR 2: CPT

## 2017-07-01 PROCEDURE — 97161 PT EVAL LOW COMPLEX 20 MIN: CPT

## 2017-07-01 PROCEDURE — 86850 RBC ANTIBODY SCREEN: CPT

## 2017-07-01 PROCEDURE — C1776: CPT

## 2017-07-01 RX ORDER — DIPHENHYDRAMINE HCL 50 MG
25 CAPSULE ORAL EVERY 4 HOURS
Qty: 0 | Refills: 0 | Status: DISCONTINUED | OUTPATIENT
Start: 2017-07-01 | End: 2017-07-01

## 2017-07-01 RX ORDER — KETOROLAC TROMETHAMINE 30 MG/ML
30 SYRINGE (ML) INJECTION EVERY 6 HOURS
Qty: 0 | Refills: 0 | Status: COMPLETED | OUTPATIENT
Start: 2017-07-01 | End: 2017-07-01

## 2017-07-01 RX ADMIN — Medication 100 MILLIGRAM(S): at 05:55

## 2017-07-01 RX ADMIN — OXYCODONE HYDROCHLORIDE 10 MILLIGRAM(S): 5 TABLET ORAL at 06:45

## 2017-07-01 RX ADMIN — Medication 100 MILLIGRAM(S): at 14:08

## 2017-07-01 RX ADMIN — OXYCODONE HYDROCHLORIDE 10 MILLIGRAM(S): 5 TABLET ORAL at 03:15

## 2017-07-01 RX ADMIN — Medication 975 MILLIGRAM(S): at 14:09

## 2017-07-01 RX ADMIN — Medication 25 MILLIGRAM(S): at 01:50

## 2017-07-01 RX ADMIN — Medication 30 MILLIGRAM(S): at 12:30

## 2017-07-01 RX ADMIN — Medication 30 MILLIGRAM(S): at 05:54

## 2017-07-01 RX ADMIN — Medication 325 MILLIGRAM(S): at 05:55

## 2017-07-01 RX ADMIN — Medication 975 MILLIGRAM(S): at 05:55

## 2017-07-01 RX ADMIN — OXYCODONE HYDROCHLORIDE 10 MILLIGRAM(S): 5 TABLET ORAL at 05:55

## 2017-07-01 RX ADMIN — OXYCODONE HYDROCHLORIDE 10 MILLIGRAM(S): 5 TABLET ORAL at 04:09

## 2017-07-01 RX ADMIN — Medication 975 MILLIGRAM(S): at 14:08

## 2017-07-01 RX ADMIN — Medication 30 MILLIGRAM(S): at 06:09

## 2017-07-01 RX ADMIN — Medication 30 MILLIGRAM(S): at 12:45

## 2017-07-01 RX ADMIN — Medication 975 MILLIGRAM(S): at 06:45

## 2017-07-01 NOTE — PROGRESS NOTE ADULT - SUBJECTIVE AND OBJECTIVE BOX
Patient seen and examined at bedside.      S: No acute events overnight.   Pain tolerable.    Denies CP/SOB. Tolerating PO.  ROS unremarkable.    O: T(C): 36.7 (07-01-17 @ 05:38), Max: 37 (06-30-17 @ 12:00)  HR: 95 (07-01-17 @ 05:38) (80 - 99)  BP: 101/68 (07-01-17 @ 05:38) (97/67 - 116/73)  RR: 17 (07-01-17 @ 05:38) (16 - 17)  SpO2: 95% (07-01-17 @ 05:38) (95% - 100%)  Wt(kg): --    NAD, AOx3, non-labored respirations  Abd soft, NTND  Dressing CDI  Extremity soft, appropriate swelling and minimally TTP  foot warm and well perfused  SILT dP, sP, Sa, Pichardo, T  firing TA/EHL/GS                          10.6   4.6   )-----------( 149      ( 01 Jul 2017 06:29 )             32.7       I&O's Detail        A/P: 47y Female s/p R TKA, stable    - analgesia with bowel regimen  - WBAT with PT  - OOB ad beny  - DVT ppx - ASA 325mg BID  - ADAT  - f/u labs  - Dispo planning

## 2017-07-03 ENCOUNTER — EMERGENCY (EMERGENCY)
Facility: HOSPITAL | Age: 47
LOS: 1 days | Discharge: HOME | End: 2017-07-03
Attending: EMERGENCY MEDICINE | Admitting: EMERGENCY MEDICINE
Payer: COMMERCIAL

## 2017-07-03 VITALS
TEMPERATURE: 99 F | HEIGHT: 63 IN | WEIGHT: 190.04 LBS | RESPIRATION RATE: 16 BRPM | DIASTOLIC BLOOD PRESSURE: 89 MMHG | HEART RATE: 114 BPM | SYSTOLIC BLOOD PRESSURE: 155 MMHG | OXYGEN SATURATION: 97 %

## 2017-07-03 VITALS
DIASTOLIC BLOOD PRESSURE: 71 MMHG | TEMPERATURE: 99 F | HEART RATE: 99 BPM | RESPIRATION RATE: 18 BRPM | SYSTOLIC BLOOD PRESSURE: 129 MMHG | OXYGEN SATURATION: 96 %

## 2017-07-03 DIAGNOSIS — M79.604 PAIN IN RIGHT LEG: ICD-10-CM

## 2017-07-03 DIAGNOSIS — Z91.040 LATEX ALLERGY STATUS: ICD-10-CM

## 2017-07-03 DIAGNOSIS — Z79.82 LONG TERM (CURRENT) USE OF ASPIRIN: ICD-10-CM

## 2017-07-03 DIAGNOSIS — Z79.899 OTHER LONG TERM (CURRENT) DRUG THERAPY: ICD-10-CM

## 2017-07-03 DIAGNOSIS — Z90.722 ACQUIRED ABSENCE OF OVARIES, BILATERAL: Chronic | ICD-10-CM

## 2017-07-03 DIAGNOSIS — Z88.0 ALLERGY STATUS TO PENICILLIN: ICD-10-CM

## 2017-07-03 DIAGNOSIS — Z90.10 ACQUIRED ABSENCE OF UNSPECIFIED BREAST AND NIPPLE: Chronic | ICD-10-CM

## 2017-07-03 DIAGNOSIS — M79.89 OTHER SPECIFIED SOFT TISSUE DISORDERS: ICD-10-CM

## 2017-07-03 DIAGNOSIS — L53.9 ERYTHEMATOUS CONDITION, UNSPECIFIED: ICD-10-CM

## 2017-07-03 DIAGNOSIS — Z88.8 ALLERGY STATUS TO OTHER DRUGS, MEDICAMENTS AND BIOLOGICAL SUBSTANCES STATUS: ICD-10-CM

## 2017-07-03 PROBLEM — M17.11 UNILATERAL PRIMARY OSTEOARTHRITIS, RIGHT KNEE: Chronic | Status: ACTIVE | Noted: 2017-06-28

## 2017-07-03 PROBLEM — C50.919 MALIGNANT NEOPLASM OF UNSPECIFIED SITE OF UNSPECIFIED FEMALE BREAST: Chronic | Status: ACTIVE | Noted: 2017-06-28

## 2017-07-03 LAB
ANION GAP SERPL CALC-SCNC: 15 MMOL/L — SIGNIFICANT CHANGE UP (ref 5–17)
BASOPHILS NFR BLD AUTO: 0.4 % — SIGNIFICANT CHANGE UP (ref 0–2)
BUN SERPL-MCNC: 11 MG/DL — SIGNIFICANT CHANGE UP (ref 7–23)
CALCIUM SERPL-MCNC: 9.4 MG/DL — SIGNIFICANT CHANGE UP (ref 8.4–10.5)
CHLORIDE SERPL-SCNC: 102 MMOL/L — SIGNIFICANT CHANGE UP (ref 96–108)
CO2 SERPL-SCNC: 24 MMOL/L — SIGNIFICANT CHANGE UP (ref 22–31)
CREAT SERPL-MCNC: 0.6 MG/DL — SIGNIFICANT CHANGE UP (ref 0.5–1.3)
CRP SERPL-MCNC: 4 MG/DL — HIGH (ref 0–0.4)
EOSINOPHIL NFR BLD AUTO: 12.9 % — HIGH (ref 0–6)
GLUCOSE SERPL-MCNC: 97 MG/DL — SIGNIFICANT CHANGE UP (ref 70–99)
HCT VFR BLD CALC: 34.2 % — LOW (ref 34.5–45)
HGB BLD-MCNC: 11.4 G/DL — LOW (ref 11.5–15.5)
LYMPHOCYTES # BLD AUTO: 17 % — SIGNIFICANT CHANGE UP (ref 13–44)
MCHC RBC-ENTMCNC: 29.2 PG — SIGNIFICANT CHANGE UP (ref 27–34)
MCHC RBC-ENTMCNC: 33.3 G/DL — SIGNIFICANT CHANGE UP (ref 32–36)
MCV RBC AUTO: 87.5 FL — SIGNIFICANT CHANGE UP (ref 80–100)
MONOCYTES NFR BLD AUTO: 9.8 % — SIGNIFICANT CHANGE UP (ref 2–14)
NEUTROPHILS NFR BLD AUTO: 59.9 % — SIGNIFICANT CHANGE UP (ref 43–77)
PLATELET # BLD AUTO: 171 K/UL — SIGNIFICANT CHANGE UP (ref 150–400)
POTASSIUM SERPL-MCNC: 4 MMOL/L — SIGNIFICANT CHANGE UP (ref 3.5–5.3)
POTASSIUM SERPL-SCNC: 4 MMOL/L — SIGNIFICANT CHANGE UP (ref 3.5–5.3)
RBC # BLD: 3.91 M/UL — SIGNIFICANT CHANGE UP (ref 3.8–5.2)
RBC # FLD: 12.5 % — SIGNIFICANT CHANGE UP (ref 10.3–16.9)
SODIUM SERPL-SCNC: 141 MMOL/L — SIGNIFICANT CHANGE UP (ref 135–145)
SURGICAL PATHOLOGY STUDY: SIGNIFICANT CHANGE UP
WBC # BLD: 4.9 K/UL — SIGNIFICANT CHANGE UP (ref 3.8–10.5)
WBC # FLD AUTO: 4.9 K/UL — SIGNIFICANT CHANGE UP (ref 3.8–10.5)

## 2017-07-03 PROCEDURE — 73560 X-RAY EXAM OF KNEE 1 OR 2: CPT

## 2017-07-03 PROCEDURE — 93971 EXTREMITY STUDY: CPT | Mod: 26,RT

## 2017-07-03 PROCEDURE — 86140 C-REACTIVE PROTEIN: CPT

## 2017-07-03 PROCEDURE — 36415 COLL VENOUS BLD VENIPUNCTURE: CPT

## 2017-07-03 PROCEDURE — 99285 EMERGENCY DEPT VISIT HI MDM: CPT

## 2017-07-03 PROCEDURE — 73560 X-RAY EXAM OF KNEE 1 OR 2: CPT | Mod: 26,RT

## 2017-07-03 PROCEDURE — 96374 THER/PROPH/DIAG INJ IV PUSH: CPT

## 2017-07-03 PROCEDURE — 93971 EXTREMITY STUDY: CPT

## 2017-07-03 PROCEDURE — 80048 BASIC METABOLIC PNL TOTAL CA: CPT

## 2017-07-03 PROCEDURE — 99284 EMERGENCY DEPT VISIT MOD MDM: CPT | Mod: 25

## 2017-07-03 PROCEDURE — 85025 COMPLETE CBC W/AUTO DIFF WBC: CPT

## 2017-07-03 RX ORDER — VANCOMYCIN HCL 1 G
1000 VIAL (EA) INTRAVENOUS ONCE
Qty: 0 | Refills: 0 | Status: COMPLETED | OUTPATIENT
Start: 2017-07-03 | End: 2017-07-03

## 2017-07-03 RX ADMIN — Medication 300 MILLIGRAM(S): at 20:52

## 2017-07-03 RX ADMIN — Medication 250 MILLIGRAM(S): at 22:13

## 2017-07-03 NOTE — ED ADULT NURSE NOTE - OBJECTIVE STATEMENT
48 y/o female w/ hx of breast ca, right knee replacement last week c/o right calf swelling and pain and petechial rash. Patient denies fevers, n/v/d. Patient unable to ambulate s/p surgery. Patient denies SOB. Patient has hx of psorarsis, patches to lower back. Petechial rash on arms legs and back.

## 2017-07-03 NOTE — ED PROVIDER NOTE - OBJECTIVE STATEMENT
here with swelling/redness of right leg with increased pain.  Discharged from the hospital 2 d ago after knee replacement surgery.  States on way out of hospital, leg was accidentally dropped and bent to 90 degrees.  Increased pain since then.  Denies fever.

## 2017-07-03 NOTE — ED PROVIDER NOTE - MUSCULOSKELETAL, MLM
Spine appears normal, right leg with 1+ edema.  incision c/d/i and covered with dressing.  mild erythema medial aspect of thigh, not tender to touch.  no lymphatic streaking

## 2017-07-03 NOTE — CONSULT NOTE ADULT - SUBJECTIVE AND OBJECTIVE BOX
Orthopaedic Surgery Consult Note    For Surgeon: Quentin    CC:  Patient is a 47y old  Female who presents with a chief complaint of redness and swelling of the R LE. Patient is s/p R TKA 6/29/17.   HPI:  Patient is s/p R TKA 6/29/17 and was discharged home on 7/1/17. Patient is a known case of psoriasis. She comes back in the ED reporting redness and increased swelling of the RLE.    Patient has been using cryocuff, walking 4 times a day and using  mg BID. Patient has an outbreak of psoriasis on the RLE contributing to erythema of the R calf.    Allergies    cefazolin (Rash)  latex (Rash)  penicillin (Rash)  predniSONE (Rash)    Intolerances      PAST MEDICAL & SURGICAL HISTORY:  H/O psoriasis  Arthritis of right knee  Breast cancer: right  H/O bilateral oophorectomy  H/O mastectomy: with implant right    MEDICATIONS  (STANDING):  vancomycin  IVPB 1000 milliGRAM(s) IV Intermittent once    MEDICATIONS  (PRN):      Vital Signs Last 24 Hrs  T(C): 37 (03 Jul 2017 20:30), Max: 37.2 (03 Jul 2017 17:07)  T(F): 98.6 (03 Jul 2017 20:30), Max: 98.9 (03 Jul 2017 17:07)  HR: 99 (03 Jul 2017 20:30) (99 - 114)  BP: 129/71 (03 Jul 2017 20:30) (129/71 - 155/89)  BP(mean): --  RR: 18 (03 Jul 2017 20:30) (16 - 18)  SpO2: 96% (03 Jul 2017 20:30) (96% - 97%)    Physical Exam:      AAO x 3    RLE    WWP, BCR, Erythema over the R Calf and shin with psoriatic plaque  DP/PT  Motor: EHL/FHL/GS/AT  Sensory: DP/SP/AT, MP/LP/S/S SILT  ROM: 0 to 15 degrees. Limited due to immediate post op period and swelling  Deformity: RLE swelling, aquacel in place CDI over the incision for R TKA                            11.4   4.9   )-----------( 171      ( 03 Jul 2017 19:10 )             34.2     07-03    141  |  102  |  11  ----------------------------<  97  4.0   |  24  |  0.60    Ca    9.4      03 Jul 2017 19:10        Imaging:     X Ray: R Knee: Implant in good position.    A/P: 47yFemale with R TKA on 6/29/17 presents with increased swelling of the RLE with eruption of psoriatic patch and redness.    1. DVT Scan of the LE.  2. A Dose of Vancomycin  3. D/c home with 7 day dose of clindamycin.    -Discussed with Dr. Saavedra. Orthopaedic Surgery Consult Note    For Surgeon: Quentin    CC:  Patient is a 47y old  Female who presents with a chief complaint of redness and swelling of the R LE. Patient is s/p R TKA 6/29/17.   HPI:  Patient is s/p R TKA 6/29/17 and was discharged home on 7/1/17. Patient is a known case of psoriasis. She comes back in the ED reporting redness and increased swelling of the RLE.    Patient has been using cryocuff, walking 4 times a day and using  mg BID. Patient has an outbreak of psoriasis on the RLE contributing to erythema of the R calf.    Allergies    cefazolin (Rash)  latex (Rash)  penicillin (Rash)  predniSONE (Rash)    Intolerances      PAST MEDICAL & SURGICAL HISTORY:  H/O psoriasis  Arthritis of right knee  Breast cancer: right  H/O bilateral oophorectomy  H/O mastectomy: with implant right    MEDICATIONS  (STANDING):  vancomycin  IVPB 1000 milliGRAM(s) IV Intermittent once    MEDICATIONS  (PRN):      Vital Signs Last 24 Hrs  T(C): 37 (03 Jul 2017 20:30), Max: 37.2 (03 Jul 2017 17:07)  T(F): 98.6 (03 Jul 2017 20:30), Max: 98.9 (03 Jul 2017 17:07)  HR: 99 (03 Jul 2017 20:30) (99 - 114)  BP: 129/71 (03 Jul 2017 20:30) (129/71 - 155/89)  BP(mean): --  RR: 18 (03 Jul 2017 20:30) (16 - 18)  SpO2: 96% (03 Jul 2017 20:30) (96% - 97%)    Physical Exam:      AAO x 3    RLE    WWP, BCR, Erythema over the R Calf and shin with psoriatic plaque  DP/PT  Motor: EHL/FHL/GS/AT  Sensory: DP/SP/AT, MP/LP/S/S SILT  ROM: 0 to 15 degrees. Limited due to immediate post op period and swelling  Deformity: RLE swelling, aquacel in place CDI over the incision for R TKA                            11.4   4.9   )-----------( 171      ( 03 Jul 2017 19:10 )             34.2     07-03    141  |  102  |  11  ----------------------------<  97  4.0   |  24  |  0.60    Ca    9.4      03 Jul 2017 19:10        Imaging:     X Ray: R Knee: Implant in good position.    Duplex Scan: No DVT seen.    A/P: 47yFemale with R TKA on 6/29/17 presents with increased swelling of the RLE with eruption of psoriatic patch and redness.    1. DVT Scan of the LE.  2. A Dose of Vancomycin  3. D/c home with 7 day dose of clindamycin.    -Discussed with Dr. Saavedra.

## 2017-07-03 NOTE — ED PROVIDER NOTE - MEDICAL DECISION MAKING DETAILS
right leg pain/swelling/mild erythema.  concern for dvt, cellulitis, post op changes.  seen by Dr. Saavedra in the ED.  does not think it is currently infected but would like to give dose of vanco and home with clindamycin to prevent/ treat possible early cellulitis. dvt study neg

## 2017-07-05 DIAGNOSIS — E66.9 OBESITY, UNSPECIFIED: ICD-10-CM

## 2017-07-05 DIAGNOSIS — Z90.11 ACQUIRED ABSENCE OF RIGHT BREAST AND NIPPLE: ICD-10-CM

## 2017-07-05 DIAGNOSIS — Z88.0 ALLERGY STATUS TO PENICILLIN: ICD-10-CM

## 2017-07-05 DIAGNOSIS — Z90.722 ACQUIRED ABSENCE OF OVARIES, BILATERAL: ICD-10-CM

## 2017-07-05 DIAGNOSIS — Z91.040 LATEX ALLERGY STATUS: ICD-10-CM

## 2017-07-05 DIAGNOSIS — M17.11 UNILATERAL PRIMARY OSTEOARTHRITIS, RIGHT KNEE: ICD-10-CM

## 2017-07-05 DIAGNOSIS — Z88.1 ALLERGY STATUS TO OTHER ANTIBIOTIC AGENTS STATUS: ICD-10-CM

## 2017-07-05 DIAGNOSIS — Z88.8 ALLERGY STATUS TO OTHER DRUGS, MEDICAMENTS AND BIOLOGICAL SUBSTANCES STATUS: ICD-10-CM

## 2017-07-05 DIAGNOSIS — L40.9 PSORIASIS, UNSPECIFIED: ICD-10-CM

## 2017-07-05 DIAGNOSIS — M25.561 PAIN IN RIGHT KNEE: ICD-10-CM

## 2017-07-05 DIAGNOSIS — Z85.3 PERSONAL HISTORY OF MALIGNANT NEOPLASM OF BREAST: ICD-10-CM

## 2017-12-21 ENCOUNTER — TRANSCRIPTION ENCOUNTER (OUTPATIENT)
Age: 47
End: 2017-12-21

## 2017-12-21 ENCOUNTER — APPOINTMENT (OUTPATIENT)
Dept: INTERNAL MEDICINE | Facility: CLINIC | Age: 47
End: 2017-12-21
Payer: COMMERCIAL

## 2017-12-21 PROCEDURE — 99213 OFFICE O/P EST LOW 20 MIN: CPT

## 2018-08-21 ENCOUNTER — INBOUND DOCUMENT (OUTPATIENT)
Age: 48
End: 2018-08-21

## 2018-10-19 PROBLEM — Z87.2 PERSONAL HISTORY OF DISEASES OF THE SKIN AND SUBCUTANEOUS TISSUE: Chronic | Status: ACTIVE | Noted: 2017-06-29

## 2018-11-06 NOTE — PHYSICAL THERAPY INITIAL EVALUATION ADULT - PERTINENT HX OF CURRENT PROBLEM, REHAB EVAL
Pt suffered with meniscus tear which led to right knee OA. Pt admitted for elective right knee replacement
0 = swallows foods/liquids without difficulty

## 2018-11-27 ENCOUNTER — APPOINTMENT (OUTPATIENT)
Dept: PLASTIC SURGERY | Facility: CLINIC | Age: 48
End: 2018-11-27
Payer: COMMERCIAL

## 2018-11-27 ENCOUNTER — INBOUND DOCUMENT (OUTPATIENT)
Age: 48
End: 2018-11-27

## 2018-11-27 VITALS — BODY MASS INDEX: 37.22 KG/M2 | HEIGHT: 64 IN | WEIGHT: 218 LBS

## 2018-11-27 DIAGNOSIS — Z85.3 PERSONAL HISTORY OF MALIGNANT NEOPLASM OF BREAST: ICD-10-CM

## 2018-11-27 PROCEDURE — 99204 OFFICE O/P NEW MOD 45 MIN: CPT

## 2018-12-06 ENCOUNTER — APPOINTMENT (OUTPATIENT)
Dept: BREAST CENTER | Facility: CLINIC | Age: 48
End: 2018-12-06
Payer: COMMERCIAL

## 2018-12-06 VITALS
WEIGHT: 218 LBS | DIASTOLIC BLOOD PRESSURE: 83 MMHG | BODY MASS INDEX: 37.22 KG/M2 | HEIGHT: 64 IN | SYSTOLIC BLOOD PRESSURE: 137 MMHG | TEMPERATURE: 98 F | HEART RATE: 80 BPM | OXYGEN SATURATION: 97 %

## 2018-12-06 PROCEDURE — 99244 OFF/OP CNSLTJ NEW/EST MOD 40: CPT

## 2018-12-28 ENCOUNTER — INBOUND DOCUMENT (OUTPATIENT)
Age: 48
End: 2018-12-28

## 2019-01-24 ENCOUNTER — APPOINTMENT (OUTPATIENT)
Dept: PLASTIC SURGERY | Facility: CLINIC | Age: 49
End: 2019-01-24

## 2019-03-12 ENCOUNTER — APPOINTMENT (OUTPATIENT)
Dept: INTERNAL MEDICINE | Facility: CLINIC | Age: 49
End: 2019-03-12
Payer: COMMERCIAL

## 2019-03-12 VITALS
HEART RATE: 83 BPM | HEIGHT: 64 IN | DIASTOLIC BLOOD PRESSURE: 76 MMHG | OXYGEN SATURATION: 97 % | BODY MASS INDEX: 35.85 KG/M2 | WEIGHT: 210 LBS | SYSTOLIC BLOOD PRESSURE: 112 MMHG | TEMPERATURE: 98.7 F

## 2019-03-12 DIAGNOSIS — Z87.09 PERSONAL HISTORY OF OTHER DISEASES OF THE RESPIRATORY SYSTEM: ICD-10-CM

## 2019-03-12 PROCEDURE — 99213 OFFICE O/P EST LOW 20 MIN: CPT | Mod: 25

## 2019-03-12 PROCEDURE — 36415 COLL VENOUS BLD VENIPUNCTURE: CPT

## 2019-03-12 NOTE — HISTORY OF PRESENT ILLNESS
[FreeTextEntry1] : Uri like symptoms; Seen at [facility] MD and no treatment given [de-identified] : Still feels weak and has sinus congestion; She continues on Femara for her history of breast cancer

## 2019-03-12 NOTE — ASSESSMENT
[FreeTextEntry1] : Since these symptoms have persisted and the patient is on treatment for her breast cancer I will give patient an antibiotic for 5 days; CBC also obtained

## 2019-03-13 LAB
BASOPHILS # BLD AUTO: 0.04 K/UL
BASOPHILS NFR BLD AUTO: 0.7 %
EOSINOPHIL # BLD AUTO: 0.21 K/UL
EOSINOPHIL NFR BLD AUTO: 3.7 %
HCT VFR BLD CALC: 40.7 %
HGB BLD-MCNC: 12.8 G/DL
IMM GRANULOCYTES NFR BLD AUTO: 0.2 %
LYMPHOCYTES # BLD AUTO: 1.66 K/UL
LYMPHOCYTES NFR BLD AUTO: 29 %
MAN DIFF?: NORMAL
MCHC RBC-ENTMCNC: 29 PG
MCHC RBC-ENTMCNC: 31.4 GM/DL
MCV RBC AUTO: 92.3 FL
MONOCYTES # BLD AUTO: 0.41 K/UL
MONOCYTES NFR BLD AUTO: 7.2 %
NEUTROPHILS # BLD AUTO: 3.39 K/UL
NEUTROPHILS NFR BLD AUTO: 59.2 %
PLATELET # BLD AUTO: 235 K/UL
RBC # BLD: 4.41 M/UL
RBC # FLD: 12.3 %
WBC # FLD AUTO: 5.72 K/UL

## 2019-07-16 ENCOUNTER — TRANSCRIPTION ENCOUNTER (OUTPATIENT)
Age: 49
End: 2019-07-16

## 2019-08-18 ENCOUNTER — INBOUND DOCUMENT (OUTPATIENT)
Age: 49
End: 2019-08-18

## 2019-11-12 ENCOUNTER — INBOUND DOCUMENT (OUTPATIENT)
Age: 49
End: 2019-11-12

## 2020-01-29 ENCOUNTER — INBOUND DOCUMENT (OUTPATIENT)
Age: 50
End: 2020-01-29

## 2020-03-26 ENCOUNTER — TRANSCRIPTION ENCOUNTER (OUTPATIENT)
Age: 50
End: 2020-03-26

## 2020-09-12 ENCOUNTER — TRANSCRIPTION ENCOUNTER (OUTPATIENT)
Age: 50
End: 2020-09-12

## 2020-09-21 ENCOUNTER — APPOINTMENT (OUTPATIENT)
Dept: ORTHOPEDIC SURGERY | Facility: CLINIC | Age: 50
End: 2020-09-21
Payer: MEDICAID

## 2020-09-21 VITALS — WEIGHT: 200 LBS | HEIGHT: 64 IN | BODY MASS INDEX: 34.15 KG/M2

## 2020-09-21 VITALS — TEMPERATURE: 98.4 F

## 2020-09-21 PROCEDURE — 99204 OFFICE O/P NEW MOD 45 MIN: CPT

## 2020-09-21 PROCEDURE — 73560 X-RAY EXAM OF KNEE 1 OR 2: CPT | Mod: RT

## 2020-09-28 ENCOUNTER — APPOINTMENT (OUTPATIENT)
Dept: INTERNAL MEDICINE | Facility: CLINIC | Age: 50
End: 2020-09-28
Payer: MEDICAID

## 2020-09-28 ENCOUNTER — NON-APPOINTMENT (OUTPATIENT)
Age: 50
End: 2020-09-28

## 2020-09-28 VITALS
HEART RATE: 99 BPM | TEMPERATURE: 97.8 F | BODY MASS INDEX: 34.31 KG/M2 | SYSTOLIC BLOOD PRESSURE: 122 MMHG | HEIGHT: 64 IN | WEIGHT: 201 LBS | OXYGEN SATURATION: 97 % | DIASTOLIC BLOOD PRESSURE: 70 MMHG

## 2020-09-28 DIAGNOSIS — M17.10 UNILATERAL PRIMARY OSTEOARTHRITIS, UNSPECIFIED KNEE: ICD-10-CM

## 2020-09-28 PROCEDURE — 99204 OFFICE O/P NEW MOD 45 MIN: CPT

## 2020-09-29 LAB — CYTOLOGY CVX/VAG DOC THIN PREP: NORMAL

## 2020-09-30 ENCOUNTER — APPOINTMENT (OUTPATIENT)
Dept: NEUROSURGERY | Facility: CLINIC | Age: 50
End: 2020-09-30
Payer: MEDICAID

## 2020-09-30 VITALS
SYSTOLIC BLOOD PRESSURE: 150 MMHG | DIASTOLIC BLOOD PRESSURE: 94 MMHG | BODY MASS INDEX: 34.15 KG/M2 | HEART RATE: 109 BPM | HEIGHT: 64 IN | WEIGHT: 200 LBS

## 2020-09-30 VITALS — TEMPERATURE: 97.2 F

## 2020-09-30 PROCEDURE — 99203 OFFICE O/P NEW LOW 30 MIN: CPT

## 2020-09-30 RX ORDER — CEFIXIME 400 MG/1
400 CAPSULE ORAL DAILY
Qty: 5 | Refills: 1 | Status: DISCONTINUED | COMMUNITY
Start: 2019-03-12 | End: 2020-09-30

## 2020-09-30 RX ORDER — MELOXICAM 15 MG/1
15 TABLET ORAL
Qty: 30 | Refills: 0 | Status: DISCONTINUED | COMMUNITY
Start: 2017-06-05 | End: 2020-09-30

## 2020-09-30 RX ORDER — FLUTICASONE PROPIONATE 50 UG/1
50 SPRAY, METERED NASAL
Qty: 16 | Refills: 0 | Status: DISCONTINUED | COMMUNITY
Start: 2017-05-06 | End: 2020-09-30

## 2020-09-30 NOTE — ASSESSMENT
[FreeTextEntry1] : 50 year old female with neck pain and left cervical radicular pain.  I do believe MRI cervical spine is now warranted to help delineate the exact nature of her pathology.  She will return to see me once she has completed the study so that we may review the results and discuss her further treatment options.

## 2020-09-30 NOTE — HISTORY OF PRESENT ILLNESS
[Neck] : neck [___ mths] : [unfilled] month(s) ago [5] : a current pain level of 5/10 [9] : a maximum pain level of 9/10 [Left] : left [Shoulder] : shoulder [Numbness] : numbness [Bending] : bending [Looking Down] : looking down [Transitioning] : transitioning [Insomnia] : insomnia [Medications] : medications [de-identified] : "pulling" [FreeTextEntry2] : left hand [FreeTextEntry6] : Ibuprofen

## 2020-09-30 NOTE — CONSULT LETTER
[Dear  ___] : Dear ~FREDA, [Consult Letter:] : I had the pleasure of evaluating your patient, [unfilled]. [Please see my note below.] : Please see my note below. [Consult Closing:] : Thank you very much for allowing me to participate in the care of this patient.  If you have any questions, please do not hesitate to contact me. [Sincerely,] : Sincerely, [FreeTextEntry2] : Molly Rea, NP\par [address] [address] [FreeTextEntry3] : Nito

## 2020-09-30 NOTE — PHYSICAL EXAM
[General Appearance - Alert] : alert [Mood] : the mood was normal [5] : C7 triceps 5/5 [4] : C8 finger flexors 4/5 [1+] : Brachioradialis left 1+ [FreeTextEntry7] : altered sensation on the right C8 and left C6 [Spurling's - Opposite Side] : Negative Spurling's on opposite side [Spurling's Same Side] : Negative Spurling's on same side [No Spinal Tenderness] : no spinal tenderness [FreeTextEntry1] : pain with cervical rotation to the left [] : no rash

## 2020-10-14 ENCOUNTER — APPOINTMENT (OUTPATIENT)
Dept: INTERNAL MEDICINE | Facility: CLINIC | Age: 50
End: 2020-10-14
Payer: MEDICAID

## 2020-10-14 VITALS
TEMPERATURE: 99.5 F | OXYGEN SATURATION: 98 % | BODY MASS INDEX: 34.15 KG/M2 | DIASTOLIC BLOOD PRESSURE: 90 MMHG | HEART RATE: 106 BPM | WEIGHT: 200 LBS | HEIGHT: 64 IN | SYSTOLIC BLOOD PRESSURE: 158 MMHG

## 2020-10-14 VITALS — SYSTOLIC BLOOD PRESSURE: 120 MMHG | DIASTOLIC BLOOD PRESSURE: 78 MMHG

## 2020-10-14 DIAGNOSIS — Z01.419 ENCOUNTER FOR GYNECOLOGICAL EXAMINATION (GENERAL) (ROUTINE) W/OUT ABNORMAL FINDINGS: ICD-10-CM

## 2020-10-14 PROCEDURE — 90715 TDAP VACCINE 7 YRS/> IM: CPT

## 2020-10-14 PROCEDURE — 90472 IMMUNIZATION ADMIN EACH ADD: CPT

## 2020-10-14 PROCEDURE — 36415 COLL VENOUS BLD VENIPUNCTURE: CPT

## 2020-10-14 PROCEDURE — 93000 ELECTROCARDIOGRAM COMPLETE: CPT

## 2020-10-14 PROCEDURE — 90682 RIV4 VACC RECOMBINANT DNA IM: CPT

## 2020-10-14 PROCEDURE — 90471 IMMUNIZATION ADMIN: CPT

## 2020-10-14 PROCEDURE — 99396 PREV VISIT EST AGE 40-64: CPT | Mod: 25

## 2020-10-15 ENCOUNTER — OUTPATIENT (OUTPATIENT)
Dept: OUTPATIENT SERVICES | Facility: HOSPITAL | Age: 50
LOS: 1 days | End: 2020-10-15
Payer: MEDICAID

## 2020-10-15 ENCOUNTER — TRANSCRIPTION ENCOUNTER (OUTPATIENT)
Age: 50
End: 2020-10-15

## 2020-10-15 ENCOUNTER — APPOINTMENT (OUTPATIENT)
Dept: MRI IMAGING | Facility: CLINIC | Age: 50
End: 2020-10-15
Payer: MEDICAID

## 2020-10-15 DIAGNOSIS — Z00.8 ENCOUNTER FOR OTHER GENERAL EXAMINATION: ICD-10-CM

## 2020-10-15 DIAGNOSIS — Z90.10 ACQUIRED ABSENCE OF UNSPECIFIED BREAST AND NIPPLE: Chronic | ICD-10-CM

## 2020-10-15 DIAGNOSIS — Z90.722 ACQUIRED ABSENCE OF OVARIES, BILATERAL: Chronic | ICD-10-CM

## 2020-10-15 PROCEDURE — 72141 MRI NECK SPINE W/O DYE: CPT | Mod: 26

## 2020-10-15 PROCEDURE — 72141 MRI NECK SPINE W/O DYE: CPT

## 2020-10-16 ENCOUNTER — APPOINTMENT (OUTPATIENT)
Dept: NEUROSURGERY | Facility: CLINIC | Age: 50
End: 2020-10-16
Payer: MEDICAID

## 2020-10-16 VITALS — BODY MASS INDEX: 34.15 KG/M2 | HEIGHT: 64 IN | WEIGHT: 200 LBS

## 2020-10-16 PROCEDURE — 99213 OFFICE O/P EST LOW 20 MIN: CPT

## 2020-10-16 NOTE — REASON FOR VISIT
[Follow-Up: _____] : a [unfilled] follow-up visit [FreeTextEntry1] : Patient returns after completing MRI cervical spine.  She is here to review the results.

## 2020-10-16 NOTE — ASSESSMENT
[FreeTextEntry1] : 50 year old female with neck pain and DDD.  We will obtain EMG/NCS to rule out any radiculopathy.  In the meantime, we will begin a course of medically supervised PT.  She will return to see me once she has completed the EMG.

## 2020-10-16 NOTE — DATA REVIEWED
[de-identified] : \par   MR Cervical Spine No Cont             Final\par \par No Documents Attached\par \par \par \par \par   EXAM:  MR SPINE CERVICAL\par \par \par PROCEDURE DATE:  10/15/2020\par \par \par \par INTERPRETATION:  EXAMINATION: MRI of the cervical spine without contrast\par \par CLINICAL INFORMATION: Neck pain and left cervical radicular pain\par \par TECHNIQUE: Multiplanar, multisequential MR imaging was performed.\par \par FINDINGS: The cervical cord is normal in signal. Vertebral body heights are maintained. Alignment is normal. There is multilevel disc degeneration.\par \par C2-C3: Minimal disc bulge with uncovertebral spurring. No spinal canal stenosis or foraminal narrowing.\par \par C3-C4: Small disc bulge with uncovertebral spurring. No spinal canal stenosis or foraminal narrowing.\par \par C4-C5: Small disc bulge with uncovertebral spurring which effaces the anterior thecal sac. Mild bilateral foraminal narrowing. No spinal canal stenosis.\par \par C5-C6: Small disc bulge with uncovertebral spurring. Mild bilateral foraminal narrowing. No spinal canal stenosis.\par \par C6-C7: Minimal disc bulge with uncovertebral spurring. No spinal canal stenosis or foraminal narrowing.\par \par C7-T1: Minimal disc bulge. No spinal canal stenosis or foraminal narrowing.\par \par There is no paraspinal muscle atrophy or edema.\par \par IMPRESSION: Mild multilevel spondylosis, most pronounced at the C4-C5 level, as above\par \par \par \par \par \par \par DOMINIC VIVEROS M.D., ATTENDING RADIOLOGIST\par This document has been electronically signed. Oct 15 2020  3:25PM\par \par  \par \par  Ordered by: OPAL STRANGE       Collected/Examined: 15Oct2020 03:18PM       \par Verified by: OPAL STRANGE 16Oct2020 09:37AM       \par  Result Communication: Schedule appointment to discuss results;\par Stage: Final       \par  Performed at: [facility]       Resulted: 15Oct2020 03:28PM       Last Updated: 16Oct2020 09:37AM       Accession: [id number]

## 2020-10-19 LAB
25(OH)D3 SERPL-MCNC: 39.9 NG/ML
ALBUMIN SERPL ELPH-MCNC: 4.9 G/DL
ALP BLD-CCNC: 102 U/L
ALT SERPL-CCNC: 105 U/L
ANION GAP SERPL CALC-SCNC: 17 MMOL/L
APPEARANCE: CLEAR
AST SERPL-CCNC: 86 U/L
BACTERIA: NEGATIVE
BASOPHILS # BLD AUTO: 0.05 K/UL
BASOPHILS NFR BLD AUTO: 0.6 %
BILIRUB SERPL-MCNC: 1 MG/DL
BILIRUBIN URINE: NEGATIVE
BLOOD URINE: NEGATIVE
BUN SERPL-MCNC: 11 MG/DL
CALCIUM SERPL-MCNC: 10.1 MG/DL
CHLORIDE SERPL-SCNC: 104 MMOL/L
CHOLEST SERPL-MCNC: 184 MG/DL
CHOLEST/HDLC SERPL: 4.6 RATIO
CO2 SERPL-SCNC: 23 MMOL/L
COLOR: NORMAL
CREAT SERPL-MCNC: 0.75 MG/DL
CRP SERPL-MCNC: 1.65 MG/DL
CYTOLOGY CVX/VAG DOC THIN PREP: ABNORMAL
EOSINOPHIL # BLD AUTO: 0.2 K/UL
EOSINOPHIL NFR BLD AUTO: 2.6 %
ERYTHROCYTE [SEDIMENTATION RATE] IN BLOOD BY WESTERGREN METHOD: 21 MM/HR
GLUCOSE QUALITATIVE U: NEGATIVE
GLUCOSE SERPL-MCNC: 112 MG/DL
HCT VFR BLD CALC: 43.4 %
HDLC SERPL-MCNC: 40 MG/DL
HGB BLD-MCNC: 13.4 G/DL
HYALINE CASTS: 0 /LPF
IMM GRANULOCYTES NFR BLD AUTO: 0.3 %
KETONES URINE: NEGATIVE
LDLC SERPL CALC-MCNC: 116 MG/DL
LEUKOCYTE ESTERASE URINE: NEGATIVE
LYMPHOCYTES # BLD AUTO: 1.95 K/UL
LYMPHOCYTES NFR BLD AUTO: 25.2 %
MAN DIFF?: NORMAL
MCHC RBC-ENTMCNC: 28.6 PG
MCHC RBC-ENTMCNC: 30.9 GM/DL
MCV RBC AUTO: 92.7 FL
MICROSCOPIC-UA: NORMAL
MONOCYTES # BLD AUTO: 0.54 K/UL
MONOCYTES NFR BLD AUTO: 7 %
NEUTROPHILS # BLD AUTO: 4.97 K/UL
NEUTROPHILS NFR BLD AUTO: 64.3 %
NITRITE URINE: NEGATIVE
PH URINE: 5.5
PLATELET # BLD AUTO: 315 K/UL
POTASSIUM SERPL-SCNC: 4.8 MMOL/L
PROT SERPL-MCNC: 7.1 G/DL
PROTEIN URINE: NEGATIVE
RBC # BLD: 4.68 M/UL
RBC # FLD: 12.7 %
RED BLOOD CELLS URINE: 1 /HPF
RHEUMATOID FACT SER QL: <10 IU/ML
SARS-COV-2 IGG SERPL IA-ACNC: <0.1 INDEX
SARS-COV-2 IGG SERPL QL IA: NEGATIVE
SAVE SPECIMEN: NORMAL
SODIUM SERPL-SCNC: 144 MMOL/L
SPECIFIC GRAVITY URINE: 1.02
SQUAMOUS EPITHELIAL CELLS: 1 /HPF
T4 FREE SERPL-MCNC: 1.2 NG/DL
TRIGL SERPL-MCNC: 142 MG/DL
TSH SERPL-ACNC: 1.09 UIU/ML
UROBILINOGEN URINE: NORMAL
WBC # FLD AUTO: 7.73 K/UL
WHITE BLOOD CELLS URINE: 1 /HPF

## 2020-10-30 ENCOUNTER — APPOINTMENT (OUTPATIENT)
Dept: NEUROLOGY | Facility: CLINIC | Age: 50
End: 2020-10-30
Payer: MEDICAID

## 2020-10-30 VITALS — TEMPERATURE: 97.3 F

## 2020-10-30 PROCEDURE — 99072 ADDL SUPL MATRL&STAF TM PHE: CPT

## 2020-10-30 PROCEDURE — 95909 NRV CNDJ TST 5-6 STUDIES: CPT

## 2020-10-30 PROCEDURE — 95886 MUSC TEST DONE W/N TEST COMP: CPT

## 2020-11-10 ENCOUNTER — APPOINTMENT (OUTPATIENT)
Dept: NEUROSURGERY | Facility: CLINIC | Age: 50
End: 2020-11-10
Payer: MEDICAID

## 2020-11-10 VITALS
HEART RATE: 92 BPM | DIASTOLIC BLOOD PRESSURE: 76 MMHG | HEIGHT: 64 IN | SYSTOLIC BLOOD PRESSURE: 121 MMHG | BODY MASS INDEX: 34.15 KG/M2 | WEIGHT: 200 LBS

## 2020-11-10 VITALS — TEMPERATURE: 97.3 F

## 2020-11-10 PROCEDURE — 99213 OFFICE O/P EST LOW 20 MIN: CPT

## 2020-11-10 PROCEDURE — 99072 ADDL SUPL MATRL&STAF TM PHE: CPT

## 2020-11-10 NOTE — ASSESSMENT
[FreeTextEntry1] : 50 year old female with neck pain and DDD.  I will provide her with another prescription for PT.  As she is complaining of headaches, I have suggested that she consult with Dr. Pandey to discuss additional treatment options.  She will follow up with me if her pain worsens.

## 2020-11-11 ENCOUNTER — APPOINTMENT (OUTPATIENT)
Dept: PAIN MANAGEMENT | Facility: CLINIC | Age: 50
End: 2020-11-11

## 2020-11-23 ENCOUNTER — APPOINTMENT (OUTPATIENT)
Dept: INTERNAL MEDICINE | Facility: CLINIC | Age: 50
End: 2020-11-23
Payer: MEDICAID

## 2020-11-23 PROCEDURE — 90750 HZV VACC RECOMBINANT IM: CPT

## 2020-11-23 PROCEDURE — 90471 IMMUNIZATION ADMIN: CPT

## 2020-12-10 ENCOUNTER — NON-APPOINTMENT (OUTPATIENT)
Age: 50
End: 2020-12-10

## 2020-12-10 ENCOUNTER — APPOINTMENT (OUTPATIENT)
Dept: PAIN MANAGEMENT | Facility: CLINIC | Age: 50
End: 2020-12-10
Payer: MEDICAID

## 2020-12-10 VITALS
SYSTOLIC BLOOD PRESSURE: 134 MMHG | HEIGHT: 64 IN | WEIGHT: 200 LBS | HEART RATE: 94 BPM | BODY MASS INDEX: 34.15 KG/M2 | DIASTOLIC BLOOD PRESSURE: 77 MMHG

## 2020-12-10 VITALS — TEMPERATURE: 97.5 F

## 2020-12-10 PROCEDURE — 99072 ADDL SUPL MATRL&STAF TM PHE: CPT

## 2020-12-10 PROCEDURE — 99204 OFFICE O/P NEW MOD 45 MIN: CPT

## 2020-12-13 ENCOUNTER — TRANSCRIPTION ENCOUNTER (OUTPATIENT)
Age: 50
End: 2020-12-13

## 2020-12-13 NOTE — HISTORY OF PRESENT ILLNESS
[Neck Pain] : neck pain [Scalp Tenderness] : scalp tenderness [> 4 hours] : > 4 hours [worsened] : worsened [5] : a minimum pain level of 5/10 [9] : a maximum pain level of 9/10 [Worsened] : The patient reports ~his/her~ symptoms since the last visit have worsened [FreeTextEntry1] : Pt is a 51 yo woman referred from Dr. AMIN who notes about 1 year ago had onset of pain in neck and shoulder.  Had been on Otezla for other reason (psoriatic arthritis) which may have improved her pain briefly but this has come back more recently.\par She had 1st seen Dr. Martinez for this neck pain and had imaging with some neck ddd, had emg/ ncs with Dr. Maloney on 10/30 that was ok and showed no specific nerve compression or injury.\par Pain does radiate down arm to left side.  Does find the pain is worst at night and can affect her range of motion in neck.  Fels mid cervical, to trapezius to arm.  \par No noted weakness, no fasciculation or atrophy.  No area of numbness.   [Headache] : headache [Nausea] : no nausea [Photophobia] : no photophobia [Phonophobia] : no phonophobia [Numbness] : no numbness [Weakness] : no weakness [Daily] : daily

## 2020-12-13 NOTE — ASSESSMENT
[FreeTextEntry1] : does note side effects in past with meloxicam.\par \par Trial of diclofenac bid and prn\par voltaren gel trial\par trial of exercise at home (and consider for myofascial release or OMM.)\par

## 2020-12-13 NOTE — PHYSICAL EXAM
[General Appearance - Alert] : alert [General Appearance - In No Acute Distress] : in no acute distress [General Appearance - Well Nourished] : well nourished [General Appearance - Well Developed] : well developed [General Appearance - Well-Appearing] : healthy appearing [Oriented To Time, Place, And Person] : oriented to person, place, and time [Impaired Insight] : insight and judgment were intact [Mood] : the mood was normal [Memory Recent] : recent memory was not impaired [Memory Remote] : remote memory was not impaired [Person] : oriented to person [Place] : oriented to place [Time] : oriented to time [Short Term Intact] : short term memory intact [Remote Intact] : remote memory intact [Registration Intact] : recent registration memory intact [Concentration Intact] : normal concentrating ability [Visual Intact] : visual attention was ~T not ~L decreased [Naming Objects] : no difficulty naming common objects [Writing A Sentence] : no difficulty writing a sentence [Fluency] : fluency intact [Comprehension] : comprehension intact [Reading] : reading intact [Current Events] : adequate knowledge of current events [Past History] : adequate knowledge of personal past history [Vocabulary] : adequate range of vocabulary [Cranial Nerves Oculomotor (III)] : extraocular motion intact [Cranial Nerves Facial (VII)] : face symmetrical [Cranial Nerves Vestibulocochlear (VIII)] : hearing was intact bilaterally [Cranial Nerves Accessory (XI - Cranial And Spinal)] : head turning and shoulder shrug symmetric [Cranial Nerves Hypoglossal (XII)] : there was no tongue deviation with protrusion [Motor Strength] : muscle strength was normal in all four extremities [Involuntary Movements] : no involuntary movements were seen [No Muscle Atrophy] : normal bulk in all four extremities [Motor Handedness Right-Handed] : the patient is right hand dominant [Motor Strength Upper Extremities Bilaterally] : strength was normal in both upper extremities [Abnormal Walk] : normal gait [Tremor] : no tremor present [Dysdiadochokinesia Bilaterally] : not present [Coordination - Dysmetria Impaired Finger-to-Nose Bilateral] : not present [Sclera] : the sclera and conjunctiva were normal [No BRYON] : no internuclear ophthalmoplegia [Strabismus] : no strabismus was seen [Edema] : there was no peripheral edema [Skin Color & Pigmentation] : normal skin color and pigmentation [] : no rash

## 2020-12-13 NOTE — REVIEW OF SYSTEMS
[Fever] : no fever [Chills] : no chills [Feeling Poorly] : feeling poorly [Feeling Tired] : not feeling tired [Recent Weight Gain (___ Lbs)] : no recent weight gain [Recent Weight Loss (___ Lbs)] : no recent weight loss [Eye Pain] : no eye pain [Eyesight Problems] : no eyesight problems [Loss Of Hearing] : no hearing loss [Nasal Discharge] : no nasal discharge [Chest Pain] : no chest pain [Palpitations] : no palpitations [Shortness Of Breath] : no shortness of breath [Cough] : no cough [As Noted in HPI] : as noted in HPI [Arthralgias] : arthralgias [Neck Pain] : neck pain [Limb Pain] : limb pain [Skin Lesions] : no skin lesions [Skin Wound] : no skin wound [Itching] : no itching [Convulsions] : no convulsions [Dizziness] : no dizziness [Fainting] : no fainting [Sleep Disturbances] : sleep disturbances [Muscle Weakness] : no muscle weakness [Swollen Glands] : no swollen glands

## 2020-12-14 ENCOUNTER — TRANSCRIPTION ENCOUNTER (OUTPATIENT)
Age: 50
End: 2020-12-14

## 2020-12-21 PROBLEM — Z87.09 HISTORY OF UPPER RESPIRATORY INFECTION: Status: RESOLVED | Noted: 2019-03-12 | Resolved: 2020-12-21

## 2020-12-23 PROBLEM — Z01.419 ENCOUNTER FOR GYNECOLOGICAL EXAMINATION: Status: RESOLVED | Noted: 2020-10-14 | Resolved: 2020-12-23

## 2020-12-28 ENCOUNTER — TRANSCRIPTION ENCOUNTER (OUTPATIENT)
Age: 50
End: 2020-12-28

## 2021-01-06 ENCOUNTER — APPOINTMENT (OUTPATIENT)
Dept: DERMATOLOGY | Facility: CLINIC | Age: 51
End: 2021-01-06

## 2021-01-12 ENCOUNTER — APPOINTMENT (OUTPATIENT)
Dept: DERMATOLOGY | Facility: CLINIC | Age: 51
End: 2021-01-12
Payer: MEDICAID

## 2021-01-12 DIAGNOSIS — L40.9 PSORIASIS, UNSPECIFIED: ICD-10-CM

## 2021-01-12 PROCEDURE — 96910 PHOTCHMTX TAR&UVB/PTRLTM&UVB: CPT

## 2021-01-12 PROCEDURE — 99072 ADDL SUPL MATRL&STAF TM PHE: CPT

## 2021-01-12 PROCEDURE — 99205 OFFICE O/P NEW HI 60 MIN: CPT | Mod: GC

## 2021-01-13 ENCOUNTER — APPOINTMENT (OUTPATIENT)
Dept: RADIOLOGY | Facility: IMAGING CENTER | Age: 51
End: 2021-01-13
Payer: MEDICAID

## 2021-01-13 ENCOUNTER — OUTPATIENT (OUTPATIENT)
Dept: OUTPATIENT SERVICES | Facility: HOSPITAL | Age: 51
LOS: 1 days | End: 2021-01-13
Payer: MEDICAID

## 2021-01-13 DIAGNOSIS — Z00.8 ENCOUNTER FOR OTHER GENERAL EXAMINATION: ICD-10-CM

## 2021-01-13 DIAGNOSIS — L40.50 ARTHROPATHIC PSORIASIS, UNSPECIFIED: ICD-10-CM

## 2021-01-13 DIAGNOSIS — Z90.722 ACQUIRED ABSENCE OF OVARIES, BILATERAL: Chronic | ICD-10-CM

## 2021-01-13 DIAGNOSIS — Z90.10 ACQUIRED ABSENCE OF UNSPECIFIED BREAST AND NIPPLE: Chronic | ICD-10-CM

## 2021-01-13 PROCEDURE — 73660 X-RAY EXAM OF TOE(S): CPT | Mod: 26,50

## 2021-01-13 PROCEDURE — 73660 X-RAY EXAM OF TOE(S): CPT

## 2021-01-13 PROCEDURE — 73130 X-RAY EXAM OF HAND: CPT

## 2021-01-13 PROCEDURE — 73130 X-RAY EXAM OF HAND: CPT | Mod: 26,50

## 2021-01-14 ENCOUNTER — APPOINTMENT (OUTPATIENT)
Dept: DERMATOLOGY | Facility: CLINIC | Age: 51
End: 2021-01-14
Payer: MEDICAID

## 2021-01-14 ENCOUNTER — NON-APPOINTMENT (OUTPATIENT)
Age: 51
End: 2021-01-14

## 2021-01-14 PROCEDURE — 96910 PHOTCHMTX TAR&UVB/PTRLTM&UVB: CPT

## 2021-01-14 PROCEDURE — 99072 ADDL SUPL MATRL&STAF TM PHE: CPT

## 2021-01-18 ENCOUNTER — TRANSCRIPTION ENCOUNTER (OUTPATIENT)
Age: 51
End: 2021-01-18

## 2021-01-20 ENCOUNTER — APPOINTMENT (OUTPATIENT)
Dept: DERMATOLOGY | Facility: CLINIC | Age: 51
End: 2021-01-20
Payer: MEDICAID

## 2021-01-20 PROCEDURE — 96910 PHOTCHMTX TAR&UVB/PTRLTM&UVB: CPT

## 2021-01-20 PROCEDURE — 99072 ADDL SUPL MATRL&STAF TM PHE: CPT

## 2021-01-21 LAB
ALBUMIN SERPL ELPH-MCNC: 4.5 G/DL
ALP BLD-CCNC: 90 U/L
ALT SERPL-CCNC: 106 U/L
ANION GAP SERPL CALC-SCNC: 15 MMOL/L
AST SERPL-CCNC: 85 U/L
BASOPHILS # BLD AUTO: 0.06 K/UL
BASOPHILS NFR BLD AUTO: 0.8 %
BILIRUB SERPL-MCNC: 0.9 MG/DL
BUN SERPL-MCNC: 11 MG/DL
CALCIUM SERPL-MCNC: 10.1 MG/DL
CHLORIDE SERPL-SCNC: 103 MMOL/L
CO2 SERPL-SCNC: 23 MMOL/L
CREAT SERPL-MCNC: 0.88 MG/DL
EOSINOPHIL # BLD AUTO: 0.19 K/UL
EOSINOPHIL NFR BLD AUTO: 2.5 %
GLUCOSE SERPL-MCNC: 95 MG/DL
HBV CORE IGG+IGM SER QL: NONREACTIVE
HBV SURFACE AB SER QL: REACTIVE
HBV SURFACE AG SER QL: NONREACTIVE
HCT VFR BLD CALC: 40.7 %
HCV AB SER QL: NONREACTIVE
HCV S/CO RATIO: 0.13 S/CO
HGB BLD-MCNC: 13.3 G/DL
IMM GRANULOCYTES NFR BLD AUTO: 0.3 %
LYMPHOCYTES # BLD AUTO: 2.07 K/UL
LYMPHOCYTES NFR BLD AUTO: 27.6 %
M TB IFN-G BLD-IMP: NEGATIVE
MAN DIFF?: NORMAL
MCHC RBC-ENTMCNC: 28.8 PG
MCHC RBC-ENTMCNC: 32.7 GM/DL
MCV RBC AUTO: 88.1 FL
MONOCYTES # BLD AUTO: 0.62 K/UL
MONOCYTES NFR BLD AUTO: 8.3 %
NEUTROPHILS # BLD AUTO: 4.55 K/UL
NEUTROPHILS NFR BLD AUTO: 60.5 %
PLATELET # BLD AUTO: 348 K/UL
POTASSIUM SERPL-SCNC: 4.3 MMOL/L
PROT SERPL-MCNC: 6.8 G/DL
QUANTIFERON TB PLUS MITOGEN MINUS NIL: >10 IU/ML
QUANTIFERON TB PLUS NIL: 0.04 IU/ML
QUANTIFERON TB PLUS TB1 MINUS NIL: 0 IU/ML
QUANTIFERON TB PLUS TB2 MINUS NIL: -0.01 IU/ML
RBC # BLD: 4.62 M/UL
RBC # FLD: 12.9 %
SODIUM SERPL-SCNC: 142 MMOL/L
WBC # FLD AUTO: 7.51 K/UL

## 2021-01-22 ENCOUNTER — APPOINTMENT (OUTPATIENT)
Dept: DERMATOLOGY | Facility: CLINIC | Age: 51
End: 2021-01-22
Payer: MEDICAID

## 2021-01-22 PROCEDURE — 96910 PHOTCHMTX TAR&UVB/PTRLTM&UVB: CPT

## 2021-01-22 PROCEDURE — 99072 ADDL SUPL MATRL&STAF TM PHE: CPT

## 2021-01-25 ENCOUNTER — APPOINTMENT (OUTPATIENT)
Dept: DERMATOLOGY | Facility: CLINIC | Age: 51
End: 2021-01-25
Payer: MEDICAID

## 2021-01-25 PROCEDURE — 99072 ADDL SUPL MATRL&STAF TM PHE: CPT

## 2021-01-25 PROCEDURE — 96910 PHOTCHMTX TAR&UVB/PTRLTM&UVB: CPT

## 2021-01-27 ENCOUNTER — APPOINTMENT (OUTPATIENT)
Dept: DERMATOLOGY | Facility: CLINIC | Age: 51
End: 2021-01-27
Payer: MEDICAID

## 2021-01-27 PROCEDURE — 99072 ADDL SUPL MATRL&STAF TM PHE: CPT

## 2021-01-27 PROCEDURE — 96910 PHOTCHMTX TAR&UVB/PTRLTM&UVB: CPT

## 2021-01-29 ENCOUNTER — APPOINTMENT (OUTPATIENT)
Dept: DERMATOLOGY | Facility: CLINIC | Age: 51
End: 2021-01-29
Payer: MEDICAID

## 2021-01-29 PROCEDURE — 96910 PHOTCHMTX TAR&UVB/PTRLTM&UVB: CPT

## 2021-01-29 PROCEDURE — 99072 ADDL SUPL MATRL&STAF TM PHE: CPT

## 2021-02-01 ENCOUNTER — APPOINTMENT (OUTPATIENT)
Dept: DERMATOLOGY | Facility: CLINIC | Age: 51
End: 2021-02-01

## 2021-02-03 ENCOUNTER — APPOINTMENT (OUTPATIENT)
Dept: DERMATOLOGY | Facility: CLINIC | Age: 51
End: 2021-02-03
Payer: MEDICAID

## 2021-02-03 PROCEDURE — 99072 ADDL SUPL MATRL&STAF TM PHE: CPT

## 2021-02-03 PROCEDURE — 96910 PHOTCHMTX TAR&UVB/PTRLTM&UVB: CPT

## 2021-02-04 ENCOUNTER — APPOINTMENT (OUTPATIENT)
Dept: DERMATOLOGY | Facility: CLINIC | Age: 51
End: 2021-02-04

## 2021-02-05 ENCOUNTER — APPOINTMENT (OUTPATIENT)
Dept: DERMATOLOGY | Facility: CLINIC | Age: 51
End: 2021-02-05
Payer: MEDICAID

## 2021-02-05 PROCEDURE — 96910 PHOTCHMTX TAR&UVB/PTRLTM&UVB: CPT

## 2021-02-05 PROCEDURE — 99072 ADDL SUPL MATRL&STAF TM PHE: CPT

## 2021-02-08 ENCOUNTER — APPOINTMENT (OUTPATIENT)
Dept: DERMATOLOGY | Facility: CLINIC | Age: 51
End: 2021-02-08
Payer: MEDICAID

## 2021-02-08 PROCEDURE — 99072 ADDL SUPL MATRL&STAF TM PHE: CPT

## 2021-02-08 PROCEDURE — 96910 PHOTCHMTX TAR&UVB/PTRLTM&UVB: CPT

## 2021-02-10 ENCOUNTER — APPOINTMENT (OUTPATIENT)
Dept: DERMATOLOGY | Facility: CLINIC | Age: 51
End: 2021-02-10
Payer: MEDICAID

## 2021-02-10 ENCOUNTER — APPOINTMENT (OUTPATIENT)
Dept: RHEUMATOLOGY | Facility: CLINIC | Age: 51
End: 2021-02-10
Payer: MEDICAID

## 2021-02-10 VITALS
HEIGHT: 64 IN | OXYGEN SATURATION: 97 % | BODY MASS INDEX: 34.15 KG/M2 | WEIGHT: 200 LBS | RESPIRATION RATE: 16 BRPM | TEMPERATURE: 97.7 F | HEART RATE: 128 BPM

## 2021-02-10 PROCEDURE — 99204 OFFICE O/P NEW MOD 45 MIN: CPT

## 2021-02-10 PROCEDURE — 99072 ADDL SUPL MATRL&STAF TM PHE: CPT

## 2021-02-10 PROCEDURE — 96910 PHOTCHMTX TAR&UVB/PTRLTM&UVB: CPT

## 2021-02-12 ENCOUNTER — LABORATORY RESULT (OUTPATIENT)
Age: 51
End: 2021-02-12

## 2021-02-12 ENCOUNTER — APPOINTMENT (OUTPATIENT)
Dept: INTERNAL MEDICINE | Facility: CLINIC | Age: 51
End: 2021-02-12

## 2021-02-12 ENCOUNTER — APPOINTMENT (OUTPATIENT)
Dept: DERMATOLOGY | Facility: CLINIC | Age: 51
End: 2021-02-12
Payer: MEDICAID

## 2021-02-12 ENCOUNTER — APPOINTMENT (OUTPATIENT)
Dept: INTERNAL MEDICINE | Facility: CLINIC | Age: 51
End: 2021-02-12
Payer: MEDICAID

## 2021-02-12 VITALS
BODY MASS INDEX: 34.15 KG/M2 | HEIGHT: 64 IN | WEIGHT: 200 LBS | OXYGEN SATURATION: 98 % | DIASTOLIC BLOOD PRESSURE: 80 MMHG | HEART RATE: 112 BPM | TEMPERATURE: 98.1 F | SYSTOLIC BLOOD PRESSURE: 142 MMHG

## 2021-02-12 DIAGNOSIS — I83.819 VARICOSE VEINS OF UNSPECIFIED LOWER EXTREMITY WITH PAIN: ICD-10-CM

## 2021-02-12 LAB
25(OH)D3 SERPL-MCNC: 64.1 NG/ML
ALBUMIN SERPL ELPH-MCNC: 4.7 G/DL
ALP BLD-CCNC: 103 U/L
ALT SERPL-CCNC: 85 U/L
ANA SER IF-ACNC: NEGATIVE
ANION GAP SERPL CALC-SCNC: 17 MMOL/L
AST SERPL-CCNC: 70 U/L
BASOPHILS # BLD AUTO: 0.06 K/UL
BASOPHILS NFR BLD AUTO: 0.8 %
BILIRUB SERPL-MCNC: 0.8 MG/DL
BUN SERPL-MCNC: 12 MG/DL
CALCIUM SERPL-MCNC: 10 MG/DL
CCP AB SER IA-ACNC: <8 UNITS
CHLORIDE SERPL-SCNC: 103 MMOL/L
CO2 SERPL-SCNC: 22 MMOL/L
CREAT SERPL-MCNC: 0.79 MG/DL
CRP SERPL-MCNC: 0.9 MG/DL
DEPRECATED KAPPA LC FREE/LAMBDA SER: 1.54 RATIO
EOSINOPHIL # BLD AUTO: 0.21 K/UL
EOSINOPHIL NFR BLD AUTO: 2.7 %
ERYTHROCYTE [SEDIMENTATION RATE] IN BLOOD BY WESTERGREN METHOD: 34 MM/HR
GLUCOSE SERPL-MCNC: 131 MG/DL
HCT VFR BLD CALC: 42.2 %
HGB BLD-MCNC: 13.6 G/DL
IGA SER QL IEP: 50 MG/DL
IGG SER QL IEP: 812 MG/DL
IGM SER QL IEP: 57 MG/DL
IMM GRANULOCYTES NFR BLD AUTO: 0.4 %
KAPPA LC CSF-MCNC: 0.95 MG/DL
KAPPA LC SERPL-MCNC: 1.46 MG/DL
LYMPHOCYTES # BLD AUTO: 2.08 K/UL
LYMPHOCYTES NFR BLD AUTO: 26.7 %
MAN DIFF?: NORMAL
MCHC RBC-ENTMCNC: 28.8 PG
MCHC RBC-ENTMCNC: 32.2 GM/DL
MCV RBC AUTO: 89.4 FL
MONOCYTES # BLD AUTO: 0.65 K/UL
MONOCYTES NFR BLD AUTO: 8.3 %
NEUTROPHILS # BLD AUTO: 4.76 K/UL
NEUTROPHILS NFR BLD AUTO: 61.1 %
PLATELET # BLD AUTO: 354 K/UL
POTASSIUM SERPL-SCNC: 4.6 MMOL/L
PROT SERPL-MCNC: 7 G/DL
RBC # BLD: 4.72 M/UL
RBC # FLD: 12.8 %
RF+CCP IGG SER-IMP: NEGATIVE
RHEUMATOID FACT SER QL: <10 IU/ML
SODIUM SERPL-SCNC: 142 MMOL/L
WBC # FLD AUTO: 7.79 K/UL

## 2021-02-12 PROCEDURE — 99072 ADDL SUPL MATRL&STAF TM PHE: CPT

## 2021-02-12 PROCEDURE — 96910 PHOTCHMTX TAR&UVB/PTRLTM&UVB: CPT

## 2021-02-12 PROCEDURE — 90750 HZV VACC RECOMBINANT IM: CPT

## 2021-02-12 PROCEDURE — 90471 IMMUNIZATION ADMIN: CPT

## 2021-02-12 PROCEDURE — 99214 OFFICE O/P EST MOD 30 MIN: CPT | Mod: 25

## 2021-02-12 RX ORDER — DICLOFENAC SODIUM 75 MG/1
75 TABLET, DELAYED RELEASE ORAL TWICE DAILY
Qty: 60 | Refills: 3 | Status: DISCONTINUED | COMMUNITY
Start: 2020-12-10 | End: 2021-02-12

## 2021-02-17 ENCOUNTER — APPOINTMENT (OUTPATIENT)
Dept: DERMATOLOGY | Facility: CLINIC | Age: 51
End: 2021-02-17
Payer: MEDICAID

## 2021-02-17 ENCOUNTER — TRANSCRIPTION ENCOUNTER (OUTPATIENT)
Age: 51
End: 2021-02-17

## 2021-02-17 LAB
CHOLEST SERPL-MCNC: 174 MG/DL
FERRITIN SERPL-MCNC: 119 NG/ML
FOLATE SERPL-MCNC: 18 NG/ML
HDLC SERPL-MCNC: 40 MG/DL
LDLC SERPL CALC-MCNC: 109 MG/DL
NONHDLC SERPL-MCNC: 133 MG/DL
TRIGL SERPL-MCNC: 122 MG/DL
TSH SERPL-ACNC: 0.85 UIU/ML
VIT B12 SERPL-MCNC: 500 PG/ML

## 2021-02-17 PROCEDURE — 99072 ADDL SUPL MATRL&STAF TM PHE: CPT

## 2021-02-17 PROCEDURE — 96910 PHOTCHMTX TAR&UVB/PTRLTM&UVB: CPT

## 2021-02-19 ENCOUNTER — APPOINTMENT (OUTPATIENT)
Dept: DERMATOLOGY | Facility: CLINIC | Age: 51
End: 2021-02-19
Payer: MEDICAID

## 2021-02-19 PROCEDURE — 99072 ADDL SUPL MATRL&STAF TM PHE: CPT

## 2021-02-19 PROCEDURE — 96910 PHOTCHMTX TAR&UVB/PTRLTM&UVB: CPT

## 2021-02-20 ENCOUNTER — APPOINTMENT (OUTPATIENT)
Dept: MRI IMAGING | Facility: CLINIC | Age: 51
End: 2021-02-20
Payer: MEDICAID

## 2021-02-20 ENCOUNTER — OUTPATIENT (OUTPATIENT)
Dept: OUTPATIENT SERVICES | Facility: HOSPITAL | Age: 51
LOS: 1 days | End: 2021-02-20
Payer: MEDICAID

## 2021-02-20 DIAGNOSIS — L40.50 ARTHROPATHIC PSORIASIS, UNSPECIFIED: ICD-10-CM

## 2021-02-20 DIAGNOSIS — Z90.722 ACQUIRED ABSENCE OF OVARIES, BILATERAL: Chronic | ICD-10-CM

## 2021-02-20 DIAGNOSIS — Z90.10 ACQUIRED ABSENCE OF UNSPECIFIED BREAST AND NIPPLE: Chronic | ICD-10-CM

## 2021-02-20 PROCEDURE — 72146 MRI CHEST SPINE W/O DYE: CPT | Mod: 26

## 2021-02-20 PROCEDURE — 72146 MRI CHEST SPINE W/O DYE: CPT

## 2021-02-22 ENCOUNTER — APPOINTMENT (OUTPATIENT)
Dept: INTERNAL MEDICINE | Facility: CLINIC | Age: 51
End: 2021-02-22
Payer: MEDICAID

## 2021-02-22 PROCEDURE — 99072 ADDL SUPL MATRL&STAF TM PHE: CPT

## 2021-02-23 ENCOUNTER — APPOINTMENT (OUTPATIENT)
Dept: DERMATOLOGY | Facility: CLINIC | Age: 51
End: 2021-02-23
Payer: MEDICAID

## 2021-02-23 LAB
SARS-COV-2 IGG SERPL IA-ACNC: 117 INDEX
SARS-COV-2 IGG SERPL QL IA: POSITIVE

## 2021-02-23 PROCEDURE — 96910 PHOTCHMTX TAR&UVB/PTRLTM&UVB: CPT

## 2021-02-23 PROCEDURE — 99072 ADDL SUPL MATRL&STAF TM PHE: CPT

## 2021-02-25 ENCOUNTER — APPOINTMENT (OUTPATIENT)
Dept: DERMATOLOGY | Facility: CLINIC | Age: 51
End: 2021-02-25
Payer: MEDICAID

## 2021-02-25 ENCOUNTER — TRANSCRIPTION ENCOUNTER (OUTPATIENT)
Age: 51
End: 2021-02-25

## 2021-02-25 PROCEDURE — 99072 ADDL SUPL MATRL&STAF TM PHE: CPT

## 2021-02-25 PROCEDURE — 96910 PHOTCHMTX TAR&UVB/PTRLTM&UVB: CPT

## 2021-03-01 ENCOUNTER — TRANSCRIPTION ENCOUNTER (OUTPATIENT)
Age: 51
End: 2021-03-01

## 2021-03-02 ENCOUNTER — TRANSCRIPTION ENCOUNTER (OUTPATIENT)
Age: 51
End: 2021-03-02

## 2021-03-02 ENCOUNTER — APPOINTMENT (OUTPATIENT)
Dept: DERMATOLOGY | Facility: CLINIC | Age: 51
End: 2021-03-02

## 2021-03-03 ENCOUNTER — APPOINTMENT (OUTPATIENT)
Dept: DERMATOLOGY | Facility: CLINIC | Age: 51
End: 2021-03-03

## 2021-03-03 ENCOUNTER — APPOINTMENT (OUTPATIENT)
Dept: DERMATOLOGY | Facility: CLINIC | Age: 51
End: 2021-03-03
Payer: MEDICAID

## 2021-03-03 PROCEDURE — 99072 ADDL SUPL MATRL&STAF TM PHE: CPT

## 2021-03-03 PROCEDURE — 96910 PHOTCHMTX TAR&UVB/PTRLTM&UVB: CPT

## 2021-03-04 ENCOUNTER — APPOINTMENT (OUTPATIENT)
Dept: PAIN MANAGEMENT | Facility: CLINIC | Age: 51
End: 2021-03-04

## 2021-03-05 ENCOUNTER — APPOINTMENT (OUTPATIENT)
Dept: MRI IMAGING | Facility: CLINIC | Age: 51
End: 2021-03-05

## 2021-03-05 ENCOUNTER — APPOINTMENT (OUTPATIENT)
Dept: DERMATOLOGY | Facility: CLINIC | Age: 51
End: 2021-03-05
Payer: MEDICAID

## 2021-03-05 PROCEDURE — 96910 PHOTCHMTX TAR&UVB/PTRLTM&UVB: CPT

## 2021-03-05 PROCEDURE — 99072 ADDL SUPL MATRL&STAF TM PHE: CPT

## 2021-03-08 ENCOUNTER — APPOINTMENT (OUTPATIENT)
Dept: DERMATOLOGY | Facility: CLINIC | Age: 51
End: 2021-03-08
Payer: MEDICAID

## 2021-03-08 PROCEDURE — 99072 ADDL SUPL MATRL&STAF TM PHE: CPT

## 2021-03-08 PROCEDURE — 96910 PHOTCHMTX TAR&UVB/PTRLTM&UVB: CPT

## 2021-03-09 ENCOUNTER — APPOINTMENT (OUTPATIENT)
Dept: DERMATOLOGY | Facility: CLINIC | Age: 51
End: 2021-03-09

## 2021-03-10 ENCOUNTER — APPOINTMENT (OUTPATIENT)
Dept: DERMATOLOGY | Facility: CLINIC | Age: 51
End: 2021-03-10
Payer: MEDICAID

## 2021-03-10 PROCEDURE — 99072 ADDL SUPL MATRL&STAF TM PHE: CPT

## 2021-03-10 PROCEDURE — 96910 PHOTCHMTX TAR&UVB/PTRLTM&UVB: CPT

## 2021-03-12 ENCOUNTER — APPOINTMENT (OUTPATIENT)
Dept: DERMATOLOGY | Facility: CLINIC | Age: 51
End: 2021-03-12
Payer: MEDICAID

## 2021-03-12 PROCEDURE — 99072 ADDL SUPL MATRL&STAF TM PHE: CPT

## 2021-03-12 PROCEDURE — 96910 PHOTCHMTX TAR&UVB/PTRLTM&UVB: CPT

## 2021-03-15 ENCOUNTER — APPOINTMENT (OUTPATIENT)
Dept: DERMATOLOGY | Facility: CLINIC | Age: 51
End: 2021-03-15
Payer: MEDICAID

## 2021-03-15 PROCEDURE — 96910 PHOTCHMTX TAR&UVB/PTRLTM&UVB: CPT

## 2021-03-15 PROCEDURE — 99072 ADDL SUPL MATRL&STAF TM PHE: CPT

## 2021-03-17 ENCOUNTER — APPOINTMENT (OUTPATIENT)
Dept: DERMATOLOGY | Facility: CLINIC | Age: 51
End: 2021-03-17

## 2021-03-19 ENCOUNTER — APPOINTMENT (OUTPATIENT)
Dept: DERMATOLOGY | Facility: CLINIC | Age: 51
End: 2021-03-19
Payer: MEDICAID

## 2021-03-19 PROCEDURE — 96910 PHOTCHMTX TAR&UVB/PTRLTM&UVB: CPT

## 2021-03-19 PROCEDURE — 99072 ADDL SUPL MATRL&STAF TM PHE: CPT

## 2021-03-22 ENCOUNTER — APPOINTMENT (OUTPATIENT)
Dept: DERMATOLOGY | Facility: CLINIC | Age: 51
End: 2021-03-22
Payer: MEDICAID

## 2021-03-22 PROCEDURE — 99072 ADDL SUPL MATRL&STAF TM PHE: CPT

## 2021-03-22 PROCEDURE — 96910 PHOTCHMTX TAR&UVB/PTRLTM&UVB: CPT

## 2021-03-24 ENCOUNTER — APPOINTMENT (OUTPATIENT)
Dept: DERMATOLOGY | Facility: CLINIC | Age: 51
End: 2021-03-24
Payer: MEDICAID

## 2021-03-24 PROCEDURE — 99072 ADDL SUPL MATRL&STAF TM PHE: CPT

## 2021-03-24 PROCEDURE — 96910 PHOTCHMTX TAR&UVB/PTRLTM&UVB: CPT

## 2021-03-26 ENCOUNTER — TRANSCRIPTION ENCOUNTER (OUTPATIENT)
Age: 51
End: 2021-03-26

## 2021-03-26 ENCOUNTER — APPOINTMENT (OUTPATIENT)
Dept: DERMATOLOGY | Facility: CLINIC | Age: 51
End: 2021-03-26
Payer: MEDICAID

## 2021-03-26 PROCEDURE — 99072 ADDL SUPL MATRL&STAF TM PHE: CPT

## 2021-03-26 PROCEDURE — 96910 PHOTCHMTX TAR&UVB/PTRLTM&UVB: CPT

## 2021-03-29 ENCOUNTER — APPOINTMENT (OUTPATIENT)
Dept: DERMATOLOGY | Facility: CLINIC | Age: 51
End: 2021-03-29
Payer: MEDICAID

## 2021-03-29 ENCOUNTER — TRANSCRIPTION ENCOUNTER (OUTPATIENT)
Age: 51
End: 2021-03-29

## 2021-03-29 PROCEDURE — 96910 PHOTCHMTX TAR&UVB/PTRLTM&UVB: CPT

## 2021-03-29 PROCEDURE — 99072 ADDL SUPL MATRL&STAF TM PHE: CPT

## 2021-03-31 ENCOUNTER — APPOINTMENT (OUTPATIENT)
Dept: DERMATOLOGY | Facility: CLINIC | Age: 51
End: 2021-03-31
Payer: MEDICAID

## 2021-03-31 PROCEDURE — 96910 PHOTCHMTX TAR&UVB/PTRLTM&UVB: CPT

## 2021-03-31 PROCEDURE — 99072 ADDL SUPL MATRL&STAF TM PHE: CPT

## 2021-04-02 ENCOUNTER — TRANSCRIPTION ENCOUNTER (OUTPATIENT)
Age: 51
End: 2021-04-02

## 2021-04-02 ENCOUNTER — APPOINTMENT (OUTPATIENT)
Dept: DERMATOLOGY | Facility: CLINIC | Age: 51
End: 2021-04-02
Payer: MEDICAID

## 2021-04-02 PROCEDURE — 99072 ADDL SUPL MATRL&STAF TM PHE: CPT

## 2021-04-02 PROCEDURE — 96910 PHOTCHMTX TAR&UVB/PTRLTM&UVB: CPT

## 2021-04-05 ENCOUNTER — APPOINTMENT (OUTPATIENT)
Dept: DERMATOLOGY | Facility: CLINIC | Age: 51
End: 2021-04-05
Payer: MEDICAID

## 2021-04-05 PROCEDURE — 96910 PHOTCHMTX TAR&UVB/PTRLTM&UVB: CPT

## 2021-04-05 PROCEDURE — 99072 ADDL SUPL MATRL&STAF TM PHE: CPT

## 2021-04-07 ENCOUNTER — APPOINTMENT (OUTPATIENT)
Dept: DERMATOLOGY | Facility: CLINIC | Age: 51
End: 2021-04-07
Payer: MEDICAID

## 2021-04-07 PROCEDURE — 96910 PHOTCHMTX TAR&UVB/PTRLTM&UVB: CPT

## 2021-04-07 PROCEDURE — 99072 ADDL SUPL MATRL&STAF TM PHE: CPT

## 2021-04-09 ENCOUNTER — APPOINTMENT (OUTPATIENT)
Dept: DERMATOLOGY | Facility: CLINIC | Age: 51
End: 2021-04-09
Payer: MEDICAID

## 2021-04-09 PROCEDURE — 99072 ADDL SUPL MATRL&STAF TM PHE: CPT

## 2021-04-09 PROCEDURE — 96910 PHOTCHMTX TAR&UVB/PTRLTM&UVB: CPT

## 2021-04-13 ENCOUNTER — NON-APPOINTMENT (OUTPATIENT)
Age: 51
End: 2021-04-13

## 2021-04-13 ENCOUNTER — APPOINTMENT (OUTPATIENT)
Dept: INTERNAL MEDICINE | Facility: CLINIC | Age: 51
End: 2021-04-13
Payer: MEDICAID

## 2021-04-13 VITALS
DIASTOLIC BLOOD PRESSURE: 70 MMHG | OXYGEN SATURATION: 97 % | SYSTOLIC BLOOD PRESSURE: 138 MMHG | WEIGHT: 200 LBS | HEART RATE: 99 BPM | HEIGHT: 64 IN | BODY MASS INDEX: 34.15 KG/M2

## 2021-04-13 DIAGNOSIS — R07.89 OTHER CHEST PAIN: ICD-10-CM

## 2021-04-13 PROCEDURE — 99214 OFFICE O/P EST MOD 30 MIN: CPT | Mod: 25

## 2021-04-13 PROCEDURE — 93000 ELECTROCARDIOGRAM COMPLETE: CPT

## 2021-04-13 PROCEDURE — 99072 ADDL SUPL MATRL&STAF TM PHE: CPT

## 2021-04-14 ENCOUNTER — APPOINTMENT (OUTPATIENT)
Dept: DERMATOLOGY | Facility: CLINIC | Age: 51
End: 2021-04-14
Payer: MEDICAID

## 2021-04-14 ENCOUNTER — TRANSCRIPTION ENCOUNTER (OUTPATIENT)
Age: 51
End: 2021-04-14

## 2021-04-14 LAB
25(OH)D3 SERPL-MCNC: 69.7 NG/ML
ALBUMIN SERPL ELPH-MCNC: 4.3 G/DL
ALP BLD-CCNC: 62 U/L
ALT SERPL-CCNC: 34 U/L
ANION GAP SERPL CALC-SCNC: 14 MMOL/L
AST SERPL-CCNC: 30 U/L
BASOPHILS # BLD AUTO: 0.03 K/UL
BASOPHILS NFR BLD AUTO: 0.5 %
BILIRUB SERPL-MCNC: 0.6 MG/DL
BUN SERPL-MCNC: 9 MG/DL
CALCIUM SERPL-MCNC: 10.2 MG/DL
CHLORIDE SERPL-SCNC: 104 MMOL/L
CO2 SERPL-SCNC: 23 MMOL/L
CREAT SERPL-MCNC: 0.62 MG/DL
DEPRECATED D DIMER PPP IA-ACNC: 221 NG/ML DDU
EOSINOPHIL # BLD AUTO: 0.1 K/UL
EOSINOPHIL NFR BLD AUTO: 1.7 %
GLUCOSE SERPL-MCNC: 101 MG/DL
HCT VFR BLD CALC: 33 %
HGB BLD-MCNC: 10.3 G/DL
IMM GRANULOCYTES NFR BLD AUTO: 0.2 %
LYMPHOCYTES # BLD AUTO: 1.55 K/UL
LYMPHOCYTES NFR BLD AUTO: 25.9 %
MAN DIFF?: NORMAL
MCHC RBC-ENTMCNC: 28.1 PG
MCHC RBC-ENTMCNC: 31.2 GM/DL
MCV RBC AUTO: 90.2 FL
MONOCYTES # BLD AUTO: 0.39 K/UL
MONOCYTES NFR BLD AUTO: 6.5 %
NEUTROPHILS # BLD AUTO: 3.91 K/UL
NEUTROPHILS NFR BLD AUTO: 65.2 %
PLATELET # BLD AUTO: 282 K/UL
POTASSIUM SERPL-SCNC: 3.9 MMOL/L
PROT SERPL-MCNC: 7 G/DL
RBC # BLD: 3.66 M/UL
RBC # FLD: 12.9 %
SODIUM SERPL-SCNC: 141 MMOL/L
T4 FREE SERPL-MCNC: 1.2 NG/DL
TSH SERPL-ACNC: 0.7 UIU/ML
WBC # FLD AUTO: 5.99 K/UL

## 2021-04-14 PROCEDURE — 96910 PHOTCHMTX TAR&UVB/PTRLTM&UVB: CPT

## 2021-04-14 PROCEDURE — 99072 ADDL SUPL MATRL&STAF TM PHE: CPT

## 2021-04-16 ENCOUNTER — APPOINTMENT (OUTPATIENT)
Dept: DERMATOLOGY | Facility: CLINIC | Age: 51
End: 2021-04-16
Payer: MEDICAID

## 2021-04-16 PROCEDURE — 99072 ADDL SUPL MATRL&STAF TM PHE: CPT

## 2021-04-16 PROCEDURE — 96910 PHOTCHMTX TAR&UVB/PTRLTM&UVB: CPT

## 2021-04-17 ENCOUNTER — EMERGENCY (EMERGENCY)
Facility: HOSPITAL | Age: 51
LOS: 1 days | Discharge: HOME | End: 2021-04-17
Attending: EMERGENCY MEDICINE | Admitting: EMERGENCY MEDICINE
Payer: MEDICAID

## 2021-04-17 VITALS
RESPIRATION RATE: 18 BRPM | OXYGEN SATURATION: 99 % | SYSTOLIC BLOOD PRESSURE: 154 MMHG | TEMPERATURE: 98 F | HEIGHT: 63 IN | WEIGHT: 199.96 LBS | HEART RATE: 118 BPM | DIASTOLIC BLOOD PRESSURE: 101 MMHG

## 2021-04-17 VITALS
DIASTOLIC BLOOD PRESSURE: 76 MMHG | TEMPERATURE: 98 F | HEART RATE: 108 BPM | SYSTOLIC BLOOD PRESSURE: 126 MMHG | RESPIRATION RATE: 18 BRPM | OXYGEN SATURATION: 97 %

## 2021-04-17 DIAGNOSIS — Z90.722 ACQUIRED ABSENCE OF OVARIES, BILATERAL: Chronic | ICD-10-CM

## 2021-04-17 DIAGNOSIS — Z90.10 ACQUIRED ABSENCE OF UNSPECIFIED BREAST AND NIPPLE: Chronic | ICD-10-CM

## 2021-04-17 LAB
ALBUMIN SERPL ELPH-MCNC: 3.8 G/DL — SIGNIFICANT CHANGE UP (ref 3.3–5)
ALP SERPL-CCNC: 64 U/L — SIGNIFICANT CHANGE UP (ref 30–120)
ALT FLD-CCNC: 42 U/L DA — SIGNIFICANT CHANGE UP (ref 10–60)
ANION GAP SERPL CALC-SCNC: 11 MMOL/L — SIGNIFICANT CHANGE UP (ref 5–17)
AST SERPL-CCNC: 31 U/L — SIGNIFICANT CHANGE UP (ref 10–40)
BASOPHILS # BLD AUTO: 0.04 K/UL — SIGNIFICANT CHANGE UP (ref 0–0.2)
BASOPHILS NFR BLD AUTO: 0.5 % — SIGNIFICANT CHANGE UP (ref 0–2)
BILIRUB SERPL-MCNC: 1 MG/DL — SIGNIFICANT CHANGE UP (ref 0.2–1.2)
BUN SERPL-MCNC: 10 MG/DL — SIGNIFICANT CHANGE UP (ref 7–23)
CALCIUM SERPL-MCNC: 9.6 MG/DL — SIGNIFICANT CHANGE UP (ref 8.4–10.5)
CHLORIDE SERPL-SCNC: 106 MMOL/L — SIGNIFICANT CHANGE UP (ref 96–108)
CO2 SERPL-SCNC: 26 MMOL/L — SIGNIFICANT CHANGE UP (ref 22–31)
CREAT SERPL-MCNC: 0.73 MG/DL — SIGNIFICANT CHANGE UP (ref 0.5–1.3)
EOSINOPHIL # BLD AUTO: 0.09 K/UL — SIGNIFICANT CHANGE UP (ref 0–0.5)
EOSINOPHIL NFR BLD AUTO: 1.2 % — SIGNIFICANT CHANGE UP (ref 0–6)
GLUCOSE SERPL-MCNC: 130 MG/DL — HIGH (ref 70–99)
HCG SERPL-ACNC: <1 MIU/ML — SIGNIFICANT CHANGE UP
HCT VFR BLD CALC: 36.5 % — SIGNIFICANT CHANGE UP (ref 34.5–45)
HGB BLD-MCNC: 11.7 G/DL — SIGNIFICANT CHANGE UP (ref 11.5–15.5)
IMM GRANULOCYTES NFR BLD AUTO: 0.3 % — SIGNIFICANT CHANGE UP (ref 0–1.5)
LYMPHOCYTES # BLD AUTO: 1.47 K/UL — SIGNIFICANT CHANGE UP (ref 1–3.3)
LYMPHOCYTES # BLD AUTO: 19.5 % — SIGNIFICANT CHANGE UP (ref 13–44)
MCHC RBC-ENTMCNC: 27.2 PG — SIGNIFICANT CHANGE UP (ref 27–34)
MCHC RBC-ENTMCNC: 32.1 GM/DL — SIGNIFICANT CHANGE UP (ref 32–36)
MCV RBC AUTO: 84.9 FL — SIGNIFICANT CHANGE UP (ref 80–100)
MONOCYTES # BLD AUTO: 0.39 K/UL — SIGNIFICANT CHANGE UP (ref 0–0.9)
MONOCYTES NFR BLD AUTO: 5.2 % — SIGNIFICANT CHANGE UP (ref 2–14)
NEUTROPHILS # BLD AUTO: 5.51 K/UL — SIGNIFICANT CHANGE UP (ref 1.8–7.4)
NEUTROPHILS NFR BLD AUTO: 73.3 % — SIGNIFICANT CHANGE UP (ref 43–77)
NRBC # BLD: 0 /100 WBCS — SIGNIFICANT CHANGE UP (ref 0–0)
PLATELET # BLD AUTO: 318 K/UL — SIGNIFICANT CHANGE UP (ref 150–400)
POTASSIUM SERPL-MCNC: 3.5 MMOL/L — SIGNIFICANT CHANGE UP (ref 3.5–5.3)
POTASSIUM SERPL-SCNC: 3.5 MMOL/L — SIGNIFICANT CHANGE UP (ref 3.5–5.3)
PROT SERPL-MCNC: 7.3 G/DL — SIGNIFICANT CHANGE UP (ref 6–8.3)
RBC # BLD: 4.3 M/UL — SIGNIFICANT CHANGE UP (ref 3.8–5.2)
RBC # FLD: 12.6 % — SIGNIFICANT CHANGE UP (ref 10.3–14.5)
SODIUM SERPL-SCNC: 143 MMOL/L — SIGNIFICANT CHANGE UP (ref 135–145)
TROPONIN I SERPL-MCNC: 0 NG/ML — LOW (ref 0.02–0.06)
WBC # BLD: 7.52 K/UL — SIGNIFICANT CHANGE UP (ref 3.8–10.5)
WBC # FLD AUTO: 7.52 K/UL — SIGNIFICANT CHANGE UP (ref 3.8–10.5)

## 2021-04-17 PROCEDURE — 80053 COMPREHEN METABOLIC PANEL: CPT

## 2021-04-17 PROCEDURE — 93005 ELECTROCARDIOGRAM TRACING: CPT

## 2021-04-17 PROCEDURE — 71045 X-RAY EXAM CHEST 1 VIEW: CPT | Mod: 26

## 2021-04-17 PROCEDURE — 36415 COLL VENOUS BLD VENIPUNCTURE: CPT

## 2021-04-17 PROCEDURE — 93010 ELECTROCARDIOGRAM REPORT: CPT

## 2021-04-17 PROCEDURE — 99285 EMERGENCY DEPT VISIT HI MDM: CPT

## 2021-04-17 PROCEDURE — 84702 CHORIONIC GONADOTROPIN TEST: CPT

## 2021-04-17 PROCEDURE — 85025 COMPLETE CBC W/AUTO DIFF WBC: CPT

## 2021-04-17 PROCEDURE — 71045 X-RAY EXAM CHEST 1 VIEW: CPT

## 2021-04-17 PROCEDURE — 84484 ASSAY OF TROPONIN QUANT: CPT

## 2021-04-17 PROCEDURE — 99284 EMERGENCY DEPT VISIT MOD MDM: CPT | Mod: 25

## 2021-04-17 RX ORDER — ALPRAZOLAM 0.25 MG
0.5 TABLET ORAL ONCE
Refills: 0 | Status: DISCONTINUED | OUTPATIENT
Start: 2021-04-17 | End: 2021-04-17

## 2021-04-17 NOTE — ED PROVIDER NOTE - NSFOLLOWUPINSTRUCTIONS_ED_ALL_ED_FT
Follow up with cardiologist and your PMD in 1-2 days for re-evaluation, ongoing care and treatment. Rest, drink plenty of fluids, take your anti-anxiety medication as prescribed earlier by your PMD. If having worsening of symptoms, chest pain or other related symptoms, RETURN TO THE ER IMMEDIATELY.     Heart Palpitations    WHAT YOU NEED TO KNOW:    Heart palpitations are feelings that your heart races, jumps, throbs, or flutters. You may feel extra beats, no beats for a short time, or skipped beats. You may have these feelings in your chest, throat, or neck. They may happen when you are sitting, standing, or lying. Heart palpitations may be frightening, but are usually not caused by a serious problem.     DISCHARGE INSTRUCTIONS:    Call 911 or have someone else call for any of the following:   •You have any of the following signs of a heart attack: ?Squeezing, pressure, or pain in your chest      ?You may also have any of the following: ?Discomfort or pain in your back, neck, jaw, stomach, or arm      ?Shortness of breath      ?Nausea or vomiting      ?Lightheadedness or a sudden cold sweat        •You have any of the following signs of a stroke: ?Numbness or drooping on one side of your face       ?Weakness in an arm or leg      ?Confusion or difficulty speaking      ?Dizziness, a severe headache, or vision loss      •You faint or lose consciousness.       Return to the emergency department if:   •Your palpitations happen more often or get more intense.           Contact your healthcare provider if:   •You have new or worsening swelling in your feet or ankles.      •You have questions or concerns about your condition or care.      Follow up with your healthcare provider as directed: You may need to follow up with a cardiologist. You may need tests to check for heart problems that cause palpitations. Write down your questions so you remember to ask them during your visits.     Keep a record: Write down when your palpitations start and stop, what you were doing when they started, and your symptoms. Keep track of what you ate or drank within a few hours of your palpitations. Include anything that seemed to help your symptoms, such as lying down or holding your breath. This record will help you and your healthcare provider learn what triggers your palpitations. Bring this record with you to your follow up visits.    Help prevent heart palpitations:   •Manage stress and anxiety. Find ways to relax such as listening to music, meditating, or doing yoga. Exercise can also help decrease stress and anxiety. Talk to someone you trust about your stress or anxiety. You can also talk to a therapist.       •Get plenty of sleep every night. Ask your healthcare provider how much sleep you need each night.       •Do not drink caffeine or alcohol. Caffeine and alcohol can make your palpitations worse. Caffeine is found in soda, coffee, tea, chocolate, and drinks that increase your energy.       •Do not smoke. Nicotine and other chemicals in cigarettes and cigars may damage your heart and blood vessels. Ask your healthcare provider for information if you currently smoke and need help to quit. E-cigarettes or smokeless tobacco still contain nicotine. Talk to your healthcare provider before you use these products.       •Do not use illegal drugs. Talk to your healthcare provider if you use illegal drugs and want help to quit.

## 2021-04-17 NOTE — ED PROVIDER NOTE - PROVIDER TOKENS
PROVIDER:[TOKEN:[[id number]],FOLLOWUP:[1-3 Days]] PROVIDER:[TOKEN:[[id number]],FOLLOWUP:[1-3 Days]],FREE:[LAST:[YOUR PMD],PHONE:[(   )    -],FAX:[(   )    -],FOLLOWUP:[1-3 Days]]

## 2021-04-17 NOTE — ED PROVIDER NOTE - PROGRESS NOTE DETAILS
Isis GARCIA for Dr. Healy   50 y/o female presents to the ED c/o palpitations. Pt does endorse having anxiety and states her mother was recently diagnosed with CA.  States she was seen by her PCP last week for the same where she had a work-up and was told she is anemic. Denies chest pain and SOB. PCP: Marley. No cardiologist.   PE: Heart rate: 118, regular, chest pain 154/101, afebrile, no distress. PERRL, EOMI. Neck supple. Heart S1, S2, rapid, regular rhythm. Lungs are clear, abdomen soft, extremities no edema, no calf swelling or tenderness.  Impression: palpitations, likely anxiety in view of recent work-up which was all negative including thyroid and d-dimer. Plan: ;abs, consider cardiology evaluation and if negative, may need out-patient psych evaluation and anti-anxiety medications as prescribed. Spoke with cardiologist, Dr. Vijay Chaudhari, will see pt in ED now for consult. Pt seen by cardiologist, Dr. Chaudhari in ED, advised pt can be discharged home and f/u outpt for holter.

## 2021-04-17 NOTE — ED PROVIDER NOTE - CARE PROVIDER_API CALL
Vijay Chaudhari (MD)  Cardiovascular Disease; Internal Medicine  [address], [address]  [place] [address]  Phone: [phone]  Fax: [phone]  Follow Up Time: 1-3 Days   Vijay Chaudhari)  Cardiovascular Disease; Internal Medicine  [address], [address]  [place] [address]  Phone: [phone]  Fax: [phone]  Follow Up Time: 1-3 Days    YOUR PMD,   Phone: (   )    -  Fax: (   )    -  Follow Up Time: 1-3 Days

## 2021-04-17 NOTE — ED PROVIDER NOTE - PATIENT PORTAL LINK FT
You can access the FollowMyHealth Patient Portal offered by [facility] by registering at the following website: [email or web address]. By joining [employer]’s FollowMyHealth portal, you will also be able to view your health information using other applications (apps) compatible with our system.

## 2021-04-17 NOTE — CONSULT NOTE ADULT - ASSESSMENT
The patient is a 51 year old female with a history of breast cancer s/p mastectomy, chemo, and radiation therapy who presents with palpitations.    Plan:  - Palpitations likely due to anxiety but need to exclude other causes  - ECG with mild sinus tachycardia but no evidence of ischemia or infarction  - Telemetry with sinus tachycardia but no other arrhythmias noted  - Labs unremarkable - not anemic  - Cardiac enzymes negative  - TSH pending  - Patient can be discharged and follow-up as outpatient for Holter monitoring

## 2021-04-17 NOTE — CONSULT NOTE ADULT - SUBJECTIVE AND OBJECTIVE BOX
History of Present Illness: The patient is a 51 year old female with a history of breast cancer s/p mastectomy, chemo, and radiation therapy who presents with palpitations. She states she has been under a lot of stress recently due to recent diagnosis of cancer in both father and mother. Over the last couple of months but especially the last week, she has felt anxious and had palpitations described as heart racing. No chest pain, dizziness, shortness of breath. Episodes often occur in the morning and if she is thinking about things. Her PMD checked a d-dimer as outpatient as she had recently received the J&J COVID-19 vaccine, which was negative, and then prescribed alprazolam.    Past Medical/Surgical History:  Breast cancer s/p mastectomy, chemo, and radiation therapy    Medications:  Home Medications:  aspirin 325 mg oral delayed release tablet: 1 tab(s) orally 2 times a day  Take for 4 weeks after surgery to prevent blood clots.  OVER THE COUNTER MEDICINE (30 Jun 2017 11:41)  bisacodyl 10 mg rectal suppository: 1 suppository(ies) rectal once a day, As needed, If no bowel movement by postoperative day #2 (30 Jun 2017 11:41)  docusate sodium 100 mg oral capsule: 1 cap(s) orally 3 times a day (30 Jun 2017 11:41)  Femara 2.5 mg oral tablet: 1 tab(s) orally once a day  Do not restart until approved by Dr. Saavedra (30 Jun 2017 11:41)  polyethylene glycol 3350 oral powder for reconstitution: 17 gram(s) orally once a day (30 Jun 2017 11:41)  senna oral tablet: 2 tab(s) orally once a day (at bedtime), As needed, Constipation (30 Jun 2017 11:41)      Family History: Non-contributory family history of premature cardiovascular atherosclerotic disease    Social History: No tobacco, alcohol or drug use    Review of Systems:  General: No fevers, chills, weight loss or gain  Skin: No rashes, color changes  Cardiovascular: No chest pain, orthopnea  Respiratory: No shortness of breath, cough  Gastrointestinal: No nausea, abdominal pain  Genitourinary: No incontinence, pain with urination  Musculoskeletal: No pain, swelling, decreased range of motion  Neurological: No headache, weakness  Psychiatric: No depression, anxiety  Endocrine: No weight loss or gain, increased thirst  All other systems are comprehensively negative.    Physical Exam:  Vitals:        Vital Signs Last 24 Hrs  T(C): 36.9 (17 Apr 2021 13:12), Max: 36.9 (17 Apr 2021 13:12)  T(F): 98.5 (17 Apr 2021 13:12), Max: 98.5 (17 Apr 2021 13:12)  HR: 110 (17 Apr 2021 14:00) (110 - 118)  BP: 164/87 (17 Apr 2021 14:00) (154/101 - 164/87)  BP(mean): --  RR: 16 (17 Apr 2021 14:00) (16 - 18)  SpO2: 99% (17 Apr 2021 14:00) (99% - 99%)  General: NAD  HEENT: MMM  Neck: No JVD, no carotid bruit  Lungs: CTAB  CV: RRR, nl S1/S2, no M/R/G  Abdomen: S/NT/ND, +BS  Extremities: No LE edema, no cyanosis  Neuro: AAOx3, non-focal  Skin: No rash    Labs:                        11.7   7.52  )-----------( 318      ( 17 Apr 2021 14:01 )             36.5     04-17    143  |  106  |  10  ----------------------------<  130<H>  3.5   |  26  |  0.73    Ca    9.6      17 Apr 2021 14:01    TPro  7.3  /  Alb  3.8  /  TBili  1.0  /  DBili  x   /  AST  31  /  ALT  42  /  AlkPhos  64  04-17    CARDIAC MARKERS ( 17 Apr 2021 14:01 )  .000 ng/mL / x     / x     / x     / x              ECG: Sinus tachycardia, normal axis, no ST abnormality

## 2021-04-17 NOTE — ED ADULT NURSE NOTE - OBJECTIVE STATEMENT
51 YOF A&Ox3 with pmh of anemia presents to ED for palpitations. pt states awoke this morning with palpitations and lightheadedness. pt states "I feel my heart beating fast". pt placed on cardiac monitor and EKG completed in ED shows sinus tachycardia. pt states has stress due to sick family member. pt denies sob, n/v/d, headaches, blurry vision. safety maintained.

## 2021-04-17 NOTE — ED PROVIDER NOTE - OBJECTIVE STATEMENT
50 y/o F with hx of breast ca s/p mastectomy and chemo, psoriasis, arthritis presents with c/o palpitations x 2 weeks. Pt states that she has intermittent palpitations for 4 months since her father was diagnosed with pancreatic ca, recently found out mother might have cancer, has been feeling very anxious at times and stressed, was seen by her PCP, Dr. Marley Naranjo on Tuesday, 4/13/21 and told to have anemia, hgb 10.3, has not started iron supplement, neg ddimer and normal tsh, was prescribed unknown anti anxiety medication but states that she has not picked up medication from pharmacy. feels lightheaded at times. States that she has been having palpitations again today. Pt denies chest pain, shortness of breath, N/V, diaphoresis, calf pain/swelling, recent travel/immobilization, smoking, hx of PE/DVT. Does not have a cardiologist. 52 y/o F with hx of breast ca s/p mastectomy and chemo, psoriasis, arthritis presents with c/o palpitations x 2 weeks. Pt states that she has intermittent palpitations for 4 months since her father was diagnosed with pancreatic ca, recently found out mother might have cancer, has been feeling very anxious at times and stressed, was seen by her PCP, Dr. Marley Naranjo on Tuesday, 4/13/21 and told to have anemia, hgb 10.3, has not started iron supplement, neg ddimer and normal tsh, was prescribed unknown anti anxiety medication ?alprazolam but states that she has not picked up medication from pharmacy. feels lightheaded at times. States that she has been having palpitations again today. Pt denies chest pain, shortness of breath, N/V, diaphoresis, calf pain/swelling, recent travel/immobilization, smoking, hx of PE/DVT. Does not have a cardiologist.

## 2021-04-17 NOTE — ED PROVIDER NOTE - CLINICAL SUMMARY MEDICAL DECISION MAKING FREE TEXT BOX
52 y/o F with hx of breast ca, psoriasis, arthritis under increased stress lately c/o intermittent palpitations x 4 months, worse x 2 weeks, saw pcp this week and had workup on 4/13, told to be anemic hgb of 10.3 but did not start iron supplement, neg tsh and ddimer, also given rx for anti anxiety med but pt has not picked up med yet, feels palpitations again today, no cp/sob/hx of pe/dvt/smoking/calf pain/swelling; Likely anxiety related; pt noted to be tachycardic in ED, will get ekg, labs, xanax, cardiology consult, reassess

## 2021-04-17 NOTE — ED ADULT TRIAGE NOTE - CHIEF COMPLAINT QUOTE
" I woke up with palpitations this am, I feel lightheaded, I was seen by my PMD last week for same, I am anemic "  Took J&J vaccine April 9

## 2021-04-19 ENCOUNTER — APPOINTMENT (OUTPATIENT)
Dept: DERMATOLOGY | Facility: CLINIC | Age: 51
End: 2021-04-19
Payer: MEDICAID

## 2021-04-19 PROCEDURE — 99072 ADDL SUPL MATRL&STAF TM PHE: CPT

## 2021-04-19 PROCEDURE — 96910 PHOTCHMTX TAR&UVB/PTRLTM&UVB: CPT

## 2021-04-20 ENCOUNTER — APPOINTMENT (OUTPATIENT)
Dept: CARDIOLOGY | Facility: CLINIC | Age: 51
End: 2021-04-20
Payer: MEDICAID

## 2021-04-20 ENCOUNTER — NON-APPOINTMENT (OUTPATIENT)
Age: 51
End: 2021-04-20

## 2021-04-20 VITALS
HEART RATE: 125 BPM | OXYGEN SATURATION: 97 % | WEIGHT: 194 LBS | SYSTOLIC BLOOD PRESSURE: 118 MMHG | DIASTOLIC BLOOD PRESSURE: 80 MMHG | BODY MASS INDEX: 33.3 KG/M2

## 2021-04-20 DIAGNOSIS — R94.31 ABNORMAL ELECTROCARDIOGRAM [ECG] [EKG]: ICD-10-CM

## 2021-04-20 PROCEDURE — 93000 ELECTROCARDIOGRAM COMPLETE: CPT

## 2021-04-20 PROCEDURE — 99204 OFFICE O/P NEW MOD 45 MIN: CPT

## 2021-04-20 PROCEDURE — 99072 ADDL SUPL MATRL&STAF TM PHE: CPT

## 2021-04-20 RX ORDER — PREDNISONE 5 MG/1
5 TABLET ORAL
Qty: 50 | Refills: 0 | Status: DISCONTINUED | COMMUNITY
Start: 2021-02-10 | End: 2021-04-20

## 2021-04-20 NOTE — DISCUSSION/SUMMARY
[FreeTextEntry1] : She is a 51-year-old with longstanding rheumatoid arthritis and history of breast cancer who presents with tachycardia and palpitations without any high risk features.  The etiology of her tachycardia is likely multifactorial.  She recently completed a prednisone taper and has had a significant amount of angst provoking news in her life.  No bio marker evidence of myocardial infarction.\par I suggested that she undergo echocardiography in order to exclude any structural or pericardial disease that may cause tachycardia.\par I encouraged her to keep her self well-hydrated and to increase her intake of potassium rich foods as her potassium was low in the emergency room.\par I also encouraged her to get more sleep and that she may use as needed Xanax as you gave her.\par She will follow-up with me in 3 to 4 weeks and further testing will be appropriate if no improvement is seen.

## 2021-04-20 NOTE — HISTORY OF PRESENT ILLNESS
[FreeTextEntry1] : Dear Molly,\par Thank you for referring her for cardiovascular evaluation.  She is a 51-year-old with a history of longstanding psoriasis and psoriatic arthritis with a history of right sided breast cancer with bone metastases who has been feeling unwell for the past 2 weeks on April 9 she received the [employer] Covid vaccine, 4 days later when hearing the news of increased clot concerned with this vaccine she reported episodes of chest discomfort and palpitations that were on and off and ultimately made her visit you in the office lab work at that time was unremarkable as was her ECG.  She was tachycardic.  This past Saturday she had further palpitations and was seen in the [facility] emergency room where she was diagnosed with new anemia.  She was told to take iron but was waiting for her doctors at [facility] to give her the A-OK.\par She has no history of coronary artery disease, hypertension, diabetes mellitus, hypercholesterolemia, smoking or family history of premature coronary artery disease.\par She has recently been dealing with the health issues of her parents.\par Her breast cancer was treated with lumpectomy and CMF with chronic letrozole therapy.  Oophorectomy was performed at some point thereafter.\par

## 2021-04-21 ENCOUNTER — APPOINTMENT (OUTPATIENT)
Dept: CARDIOLOGY | Facility: CLINIC | Age: 51
End: 2021-04-21
Payer: MEDICAID

## 2021-04-21 ENCOUNTER — APPOINTMENT (OUTPATIENT)
Dept: DERMATOLOGY | Facility: CLINIC | Age: 51
End: 2021-04-21

## 2021-04-21 PROCEDURE — 93306 TTE W/DOPPLER COMPLETE: CPT

## 2021-04-21 PROCEDURE — 99072 ADDL SUPL MATRL&STAF TM PHE: CPT

## 2021-04-22 ENCOUNTER — NON-APPOINTMENT (OUTPATIENT)
Age: 51
End: 2021-04-22

## 2021-04-23 ENCOUNTER — APPOINTMENT (OUTPATIENT)
Dept: DERMATOLOGY | Facility: CLINIC | Age: 51
End: 2021-04-23

## 2021-04-26 ENCOUNTER — APPOINTMENT (OUTPATIENT)
Dept: DERMATOLOGY | Facility: CLINIC | Age: 51
End: 2021-04-26
Payer: MEDICAID

## 2021-04-26 PROCEDURE — 96910 PHOTCHMTX TAR&UVB/PTRLTM&UVB: CPT

## 2021-04-26 PROCEDURE — 99072 ADDL SUPL MATRL&STAF TM PHE: CPT

## 2021-04-27 ENCOUNTER — EMERGENCY (EMERGENCY)
Facility: HOSPITAL | Age: 51
LOS: 1 days | Discharge: HOME | End: 2021-04-27
Attending: EMERGENCY MEDICINE
Payer: MEDICAID

## 2021-04-27 VITALS
SYSTOLIC BLOOD PRESSURE: 140 MMHG | OXYGEN SATURATION: 99 % | HEART RATE: 124 BPM | RESPIRATION RATE: 18 BRPM | TEMPERATURE: 98 F | WEIGHT: 194.01 LBS | HEIGHT: 63 IN | DIASTOLIC BLOOD PRESSURE: 92 MMHG

## 2021-04-27 VITALS
SYSTOLIC BLOOD PRESSURE: 117 MMHG | RESPIRATION RATE: 18 BRPM | DIASTOLIC BLOOD PRESSURE: 93 MMHG | TEMPERATURE: 98 F | HEART RATE: 102 BPM | OXYGEN SATURATION: 98 %

## 2021-04-27 DIAGNOSIS — Z90.10 ACQUIRED ABSENCE OF UNSPECIFIED BREAST AND NIPPLE: Chronic | ICD-10-CM

## 2021-04-27 DIAGNOSIS — Z90.722 ACQUIRED ABSENCE OF OVARIES, BILATERAL: Chronic | ICD-10-CM

## 2021-04-27 LAB
ALBUMIN SERPL ELPH-MCNC: 4.3 G/DL — SIGNIFICANT CHANGE UP (ref 3.3–5)
ALP SERPL-CCNC: 72 U/L — SIGNIFICANT CHANGE UP (ref 40–120)
ALT FLD-CCNC: 48 U/L — HIGH (ref 10–45)
ANION GAP SERPL CALC-SCNC: 14 MMOL/L — SIGNIFICANT CHANGE UP (ref 5–17)
APPEARANCE UR: CLEAR — SIGNIFICANT CHANGE UP
APTT BLD: 30.6 SEC — SIGNIFICANT CHANGE UP (ref 27.5–35.5)
AST SERPL-CCNC: 51 U/L — HIGH (ref 10–40)
BACTERIA # UR AUTO: NEGATIVE — SIGNIFICANT CHANGE UP
BASE EXCESS BLDV CALC-SCNC: 2.1 MMOL/L — HIGH (ref -2–2)
BASOPHILS # BLD AUTO: 0.03 K/UL — SIGNIFICANT CHANGE UP (ref 0–0.2)
BASOPHILS NFR BLD AUTO: 0.4 % — SIGNIFICANT CHANGE UP (ref 0–2)
BILIRUB SERPL-MCNC: 0.9 MG/DL — SIGNIFICANT CHANGE UP (ref 0.2–1.2)
BILIRUB UR-MCNC: NEGATIVE — SIGNIFICANT CHANGE UP
BUN SERPL-MCNC: 8 MG/DL — SIGNIFICANT CHANGE UP (ref 7–23)
CA-I SERPL-SCNC: 1.2 MMOL/L — SIGNIFICANT CHANGE UP (ref 1.12–1.3)
CALCIUM SERPL-MCNC: 9.9 MG/DL — SIGNIFICANT CHANGE UP (ref 8.4–10.5)
CHLORIDE BLDV-SCNC: 110 MMOL/L — HIGH (ref 96–108)
CHLORIDE SERPL-SCNC: 107 MMOL/L — SIGNIFICANT CHANGE UP (ref 96–108)
CO2 BLDV-SCNC: 28 MMOL/L — SIGNIFICANT CHANGE UP (ref 22–30)
CO2 SERPL-SCNC: 21 MMOL/L — LOW (ref 22–31)
COLOR SPEC: YELLOW — SIGNIFICANT CHANGE UP
CREAT SERPL-MCNC: 0.63 MG/DL — SIGNIFICANT CHANGE UP (ref 0.5–1.3)
DIFF PNL FLD: ABNORMAL
EOSINOPHIL # BLD AUTO: 0.04 K/UL — SIGNIFICANT CHANGE UP (ref 0–0.5)
EOSINOPHIL NFR BLD AUTO: 0.6 % — SIGNIFICANT CHANGE UP (ref 0–6)
EPI CELLS # UR: 1 /HPF — SIGNIFICANT CHANGE UP
GAS PNL BLDV: 140 MMOL/L — SIGNIFICANT CHANGE UP (ref 135–145)
GAS PNL BLDV: SIGNIFICANT CHANGE UP
GAS PNL BLDV: SIGNIFICANT CHANGE UP
GLUCOSE BLDV-MCNC: 124 MG/DL — HIGH (ref 70–99)
GLUCOSE SERPL-MCNC: 119 MG/DL — HIGH (ref 70–99)
GLUCOSE UR QL: NEGATIVE — SIGNIFICANT CHANGE UP
HCG SERPL-ACNC: <2 MIU/ML — SIGNIFICANT CHANGE UP
HCO3 BLDV-SCNC: 27 MMOL/L — SIGNIFICANT CHANGE UP (ref 21–29)
HCT VFR BLD CALC: 36.4 % — SIGNIFICANT CHANGE UP (ref 34.5–45)
HCT VFR BLDA CALC: 38 % — LOW (ref 39–50)
HGB BLD CALC-MCNC: 12.3 G/DL — SIGNIFICANT CHANGE UP (ref 11.5–15.5)
HGB BLD-MCNC: 11.5 G/DL — SIGNIFICANT CHANGE UP (ref 11.5–15.5)
HYALINE CASTS # UR AUTO: 3 /LPF — HIGH (ref 0–2)
IMM GRANULOCYTES NFR BLD AUTO: 0.3 % — SIGNIFICANT CHANGE UP (ref 0–1.5)
INR BLD: 1.17 RATIO — HIGH (ref 0.88–1.16)
KETONES UR-MCNC: NEGATIVE — SIGNIFICANT CHANGE UP
LACTATE BLDV-MCNC: 1.3 MMOL/L — SIGNIFICANT CHANGE UP (ref 0.7–2)
LEUKOCYTE ESTERASE UR-ACNC: NEGATIVE — SIGNIFICANT CHANGE UP
LIDOCAIN IGE QN: 51 U/L — SIGNIFICANT CHANGE UP (ref 7–60)
LYMPHOCYTES # BLD AUTO: 1.4 K/UL — SIGNIFICANT CHANGE UP (ref 1–3.3)
LYMPHOCYTES # BLD AUTO: 19.8 % — SIGNIFICANT CHANGE UP (ref 13–44)
MAGNESIUM SERPL-MCNC: 2 MG/DL — SIGNIFICANT CHANGE UP (ref 1.6–2.6)
MCHC RBC-ENTMCNC: 27.1 PG — SIGNIFICANT CHANGE UP (ref 27–34)
MCHC RBC-ENTMCNC: 31.6 GM/DL — LOW (ref 32–36)
MCV RBC AUTO: 85.8 FL — SIGNIFICANT CHANGE UP (ref 80–100)
MONOCYTES # BLD AUTO: 0.45 K/UL — SIGNIFICANT CHANGE UP (ref 0–0.9)
MONOCYTES NFR BLD AUTO: 6.4 % — SIGNIFICANT CHANGE UP (ref 2–14)
NEUTROPHILS # BLD AUTO: 5.13 K/UL — SIGNIFICANT CHANGE UP (ref 1.8–7.4)
NEUTROPHILS NFR BLD AUTO: 72.5 % — SIGNIFICANT CHANGE UP (ref 43–77)
NITRITE UR-MCNC: NEGATIVE — SIGNIFICANT CHANGE UP
NRBC # BLD: 0 /100 WBCS — SIGNIFICANT CHANGE UP (ref 0–0)
NT-PROBNP SERPL-SCNC: 21 PG/ML — SIGNIFICANT CHANGE UP (ref 0–300)
PCO2 BLDV: 44 MMHG — SIGNIFICANT CHANGE UP (ref 35–50)
PH BLDV: 7.4 — SIGNIFICANT CHANGE UP (ref 7.35–7.45)
PH UR: 6 — SIGNIFICANT CHANGE UP (ref 5–8)
PHOSPHATE SERPL-MCNC: 3.5 MG/DL — SIGNIFICANT CHANGE UP (ref 2.5–4.5)
PLATELET # BLD AUTO: 340 K/UL — SIGNIFICANT CHANGE UP (ref 150–400)
PO2 BLDV: 28 MMHG — SIGNIFICANT CHANGE UP (ref 25–45)
POTASSIUM BLDV-SCNC: 3.8 MMOL/L — SIGNIFICANT CHANGE UP (ref 3.5–5.3)
POTASSIUM SERPL-MCNC: 4.4 MMOL/L — SIGNIFICANT CHANGE UP (ref 3.5–5.3)
POTASSIUM SERPL-SCNC: 4.4 MMOL/L — SIGNIFICANT CHANGE UP (ref 3.5–5.3)
PROT SERPL-MCNC: 7.1 G/DL — SIGNIFICANT CHANGE UP (ref 6–8.3)
PROT UR-MCNC: ABNORMAL
PROTHROM AB SERPL-ACNC: 13.9 SEC — HIGH (ref 10.6–13.6)
RBC # BLD: 4.24 M/UL — SIGNIFICANT CHANGE UP (ref 3.8–5.2)
RBC # FLD: 12.9 % — SIGNIFICANT CHANGE UP (ref 10.3–14.5)
RBC CASTS # UR COMP ASSIST: 10 /HPF — HIGH (ref 0–4)
SAO2 % BLDV: 46 % — LOW (ref 67–88)
SARS-COV-2 RNA SPEC QL NAA+PROBE: SIGNIFICANT CHANGE UP
SODIUM SERPL-SCNC: 142 MMOL/L — SIGNIFICANT CHANGE UP (ref 135–145)
SP GR SPEC: 1.02 — SIGNIFICANT CHANGE UP (ref 1.01–1.02)
TROPONIN T, HIGH SENSITIVITY RESULT: <6 NG/L — SIGNIFICANT CHANGE UP (ref 0–51)
UROBILINOGEN FLD QL: NEGATIVE — SIGNIFICANT CHANGE UP
WBC # BLD: 7.07 K/UL — SIGNIFICANT CHANGE UP (ref 3.8–10.5)
WBC # FLD AUTO: 7.07 K/UL — SIGNIFICANT CHANGE UP (ref 3.8–10.5)
WBC UR QL: 4 /HPF — SIGNIFICANT CHANGE UP (ref 0–5)

## 2021-04-27 PROCEDURE — 83605 ASSAY OF LACTIC ACID: CPT

## 2021-04-27 PROCEDURE — 84100 ASSAY OF PHOSPHORUS: CPT

## 2021-04-27 PROCEDURE — 84702 CHORIONIC GONADOTROPIN TEST: CPT

## 2021-04-27 PROCEDURE — 71275 CT ANGIOGRAPHY CHEST: CPT | Mod: 26,MG

## 2021-04-27 PROCEDURE — 85018 HEMOGLOBIN: CPT

## 2021-04-27 PROCEDURE — 93005 ELECTROCARDIOGRAM TRACING: CPT

## 2021-04-27 PROCEDURE — 82330 ASSAY OF CALCIUM: CPT

## 2021-04-27 PROCEDURE — 85730 THROMBOPLASTIN TIME PARTIAL: CPT

## 2021-04-27 PROCEDURE — 82947 ASSAY GLUCOSE BLOOD QUANT: CPT

## 2021-04-27 PROCEDURE — 74177 CT ABD & PELVIS W/CONTRAST: CPT

## 2021-04-27 PROCEDURE — 83880 ASSAY OF NATRIURETIC PEPTIDE: CPT

## 2021-04-27 PROCEDURE — 81001 URINALYSIS AUTO W/SCOPE: CPT

## 2021-04-27 PROCEDURE — 84484 ASSAY OF TROPONIN QUANT: CPT

## 2021-04-27 PROCEDURE — 82435 ASSAY OF BLOOD CHLORIDE: CPT

## 2021-04-27 PROCEDURE — 85014 HEMATOCRIT: CPT

## 2021-04-27 PROCEDURE — 83690 ASSAY OF LIPASE: CPT

## 2021-04-27 PROCEDURE — 71275 CT ANGIOGRAPHY CHEST: CPT

## 2021-04-27 PROCEDURE — 93010 ELECTROCARDIOGRAM REPORT: CPT

## 2021-04-27 PROCEDURE — U0003: CPT

## 2021-04-27 PROCEDURE — 84132 ASSAY OF SERUM POTASSIUM: CPT

## 2021-04-27 PROCEDURE — 83735 ASSAY OF MAGNESIUM: CPT

## 2021-04-27 PROCEDURE — 99285 EMERGENCY DEPT VISIT HI MDM: CPT

## 2021-04-27 PROCEDURE — 74177 CT ABD & PELVIS W/CONTRAST: CPT | Mod: 26,MA

## 2021-04-27 PROCEDURE — 85025 COMPLETE CBC W/AUTO DIFF WBC: CPT

## 2021-04-27 PROCEDURE — 99284 EMERGENCY DEPT VISIT MOD MDM: CPT | Mod: 25

## 2021-04-27 PROCEDURE — 85610 PROTHROMBIN TIME: CPT

## 2021-04-27 PROCEDURE — 84295 ASSAY OF SERUM SODIUM: CPT

## 2021-04-27 PROCEDURE — U0005: CPT

## 2021-04-27 PROCEDURE — 82803 BLOOD GASES ANY COMBINATION: CPT

## 2021-04-27 PROCEDURE — G1004: CPT

## 2021-04-27 PROCEDURE — 80053 COMPREHEN METABOLIC PANEL: CPT

## 2021-04-27 PROCEDURE — 36000 PLACE NEEDLE IN VEIN: CPT | Mod: XU

## 2021-04-27 RX ORDER — SODIUM CHLORIDE 9 MG/ML
1000 INJECTION INTRAMUSCULAR; INTRAVENOUS; SUBCUTANEOUS ONCE
Refills: 0 | Status: COMPLETED | OUTPATIENT
Start: 2021-04-27 | End: 2021-04-27

## 2021-04-27 RX ADMIN — SODIUM CHLORIDE 1000 MILLILITER(S): 9 INJECTION INTRAMUSCULAR; INTRAVENOUS; SUBCUTANEOUS at 13:28

## 2021-04-27 RX ADMIN — SODIUM CHLORIDE 1000 MILLILITER(S): 9 INJECTION INTRAMUSCULAR; INTRAVENOUS; SUBCUTANEOUS at 15:00

## 2021-04-27 NOTE — ED PROVIDER NOTE - NSFOLLOWUPINSTRUCTIONS_ED_ALL_ED_FT
1) Follow up with your PCP within 1-3 days of being seen in the Emergency Department   2) Return to the ED immediately for new or worsening symptoms severe pain, difficulty breathing, pain in chest, vomiting, dizziness   3) Please continue to take any home medications as prescribed  4) Your test results from your ED visit were discussed with you prior to discharge  5) You were provided with a copy of your test results  6) You can take Tylenol/Motrin as needed for pain  7) Your liver enzymes were slightly elevated compared to your visit 2 weeks ago, please have repeat testing with PCP  8) Your urine showed a little bit of microscopic blood, please follow up with urology within 1-2 weeks. It is possible that you passed a kidney stone prior to getting the CT scan.

## 2021-04-27 NOTE — ED PROVIDER NOTE - CLINICAL SUMMARY MEDICAL DECISION MAKING FREE TEXT BOX
51 year old female with a pmhx of psoriatic arthritis, breast CA, presenting for eval of left flank pain. Pt tachycardic on arrival, other vitals stable. Plan to CTA pt to r/o PE in setting of multiple risk factors. Will obtain labs, CT abd/pelv eval renal pathology, UA

## 2021-04-27 NOTE — ED PROVIDER NOTE - ATTENDING CONTRIBUTION TO CARE
attending Saad: 51yF h/o breast CA (remote, diagnosed at age 29, s/p chemo with recurrence at age 38, s/p radiation), psoriatic arthritis, renal stones, p/w sudden onset of L flank pain since yesterday. Pain somewhat worse with movement and with deep inspiration. Denies trauma. Unsure if similar to prior stones. Denies prior DVT/PE. Reports significant stress in setting of care for family members who are ill. On exam, tachycardic, no rashes, no CVAT, unable to reproduce L flank pain on palpation. Will obtain ekg, place on tele, labs, CTA chest eval PE, CT A/P eval stone, UA, reassess

## 2021-04-27 NOTE — ED PROVIDER NOTE - PATIENT PORTAL LINK FT
You can access the FollowMyHealth Patient Portal offered by [facility] by registering at the following website: [email or web address]. By joining [employer]’s FollowMyHealth portal, you will also be able to view your health information using other applications (apps) compatible with our system.

## 2021-04-27 NOTE — ED PROVIDER NOTE - OBJECTIVE STATEMENT
51 year old female with a pmhx of breast CA diagnosed at age 29, s/p chemo with return of cancer at age 38, s/p radiation, psoriatic arthritis, renal stones, presenting with sudden onset of left flank pain since yesterday. Patient reporting sharp, severe pain at left flank. Unsure if this was similar to previous renal stones (which was right sided). Patient reports 2 weeks ago having palpitations and an ED visit that consisted of EKG, troponin, both of which were normal. She also had a d-dimer that was normal a few weeks ago, but was asymptomatic at that time. Patient reporting it was done due to recenty getting the [employer] Covid-19 vaccine. Patient has otherwise been under increase stress as some of her family members are ill and she has been caring for them. Patient denies any fever, chills, cough, abd pain, vomiting, dysuria, or hematuria.

## 2021-04-27 NOTE — ED ADULT NURSE NOTE - OBJECTIVE STATEMENT
50 y/o F, PMH breast CA (dx age 29), s/p chemo with return of cancer at age 38 (s/p radiation), psoriatic arthritis, renal stones, presenting to ED with sudden onset of L flank pain since yesterday, worse with movement, does not feel as sharp as past renal stones which were R sided. Pt is s/p [employer] Covid-19 vaccine, had d-dimer recently due to getting that vaccine, also had palpitations last week, presented to ED and had a full cardiac workup that was negative. Pt reports some increased personal stress due to having to take care of her ill parents. Pt denies headache, dizziness, chest pain, palpitations, SOB, abdominal pain, n/v/d, urinary symptoms, fevers, chills, weakness at this time.

## 2021-04-27 NOTE — ED PROVIDER NOTE - NS ED ROS FT
General: no fever, no chills  Eyes: no vision changes, no eye pain  Cardiovascular: no chest pain, no edema  Respiratory: no cough, no shortness of breath  Gastrointestinal: no abdominal pain, no nausea, no vomiting, no diarrhea  Genitourinary: no dysuria, no hematuria  Musculoskeletal: no joint pain, +left flank pain   Skin: no rash, no lesions  Neuro: no numbness, no tingling  Psych: no depression, no anxiety

## 2021-04-27 NOTE — ED PROVIDER NOTE - PROGRESS NOTE DETAILS
Ceasar Burroughs, PGY-1- Discussed results of work up with pt. Aware she needs to see PCP within 1-3 days and urology follow up given for the RBCs in UA. Pt also to f/u with PCP for elevated LFTs. Discussed results of work up with patient. Patient agrees with plan to discharge home. All questions answered regarding plan. Strict return precautions given. Ceasar Burroughs, PGY-1- Awaiting results of CTs. Answered all patient's questions. Pain is tolerable at this time. Pt anxious

## 2021-04-27 NOTE — ED ADULT NURSE NOTE - NSIMPLEMENTINTERV_GEN_ALL_ED
Implemented All Universal Safety Interventions:  [place] to call system. Call bell, personal items and telephone within reach. Instruct patient to call for assistance. Room bathroom lighting operational. Non-slip footwear when patient is off stretcher. Physically safe environment: no spills, clutter or unnecessary equipment. Stretcher in lowest position, wheels locked, appropriate side rails in place.

## 2021-04-27 NOTE — ED PROVIDER NOTE - NSFOLLOWUPCLINICS_GEN_ALL_ED_FT
[facility]  Urology  [address] [place] [address]  Phone: [phone]  Fax:   Follow Up Time: 7-10 Days

## 2021-04-28 ENCOUNTER — APPOINTMENT (OUTPATIENT)
Dept: DERMATOLOGY | Facility: CLINIC | Age: 51
End: 2021-04-28

## 2021-04-30 ENCOUNTER — APPOINTMENT (OUTPATIENT)
Dept: DERMATOLOGY | Facility: CLINIC | Age: 51
End: 2021-04-30

## 2021-05-03 ENCOUNTER — APPOINTMENT (OUTPATIENT)
Dept: UROLOGY | Facility: CLINIC | Age: 51
End: 2021-05-03
Payer: MEDICAID

## 2021-05-03 PROCEDURE — 99202 OFFICE O/P NEW SF 15 MIN: CPT

## 2021-05-03 PROCEDURE — 99072 ADDL SUPL MATRL&STAF TM PHE: CPT

## 2021-05-03 NOTE — PHYSICAL EXAM
[General Appearance - Well Developed] : well developed [General Appearance - Well Nourished] : well nourished [Normal Appearance] : normal appearance [Well Groomed] : well groomed [General Appearance - In No Acute Distress] : no acute distress [Edema] : no peripheral edema [Respiration, Rhythm And Depth] : normal respiratory rhythm and effort [Exaggerated Use Of Accessory Muscles For Inspiration] : no accessory muscle use [Abdomen Soft] : soft [Abdomen Tenderness] : non-tender [Normal Station and Gait] : the gait and station were normal for the patient's age [] : no rash [No Focal Deficits] : no focal deficits [Oriented To Time, Place, And Person] : oriented to person, place, and time [Affect] : the affect was normal [Mood] : the mood was normal [Not Anxious] : not anxious

## 2021-05-03 NOTE — ASSESSMENT
[FreeTextEntry1] : Ms. Hughes is a 51 year old female presenting today for lower back pain that radiates. \par She is currently  a breast cancer patient at [facility].\par She has a past history of kidney stones.\par \par Her CT abdomen from 4/27/21 demonstrated: \par KIDNEYS/URETERS: Right renal subcentimeter hypodense focus, too small to characterize. No hydronephrosis. Left kidney is normal.\par \par BLADDER: Within normal limits.\par REPRODUCTIVE ORGANS: Uterus and adnexa within normal limits.\par \par The patient produced a urine sample which will be sent for urinalysis and urine culture. \par \par I recommend that she calls on 5/5/21 to check the results of the urine tests. \par If there is blood in the urine, we will schedule her for further testing. \par \par I spent 25 minutes with the patient reviewing the CT and consulting her.

## 2021-05-03 NOTE — HISTORY OF PRESENT ILLNESS
[FreeTextEntry1] : Ms. Hughes is a 51 year old female presenting today for lower back pain that radiates. \par She is currently  a breast cancer patient at [facility].\par She has a past history of kidney stones.\par \par Her CT abdomen from 4/27/21 demonstrated: \par KIDNEYS/URETERS: Right renal subcentimeter hypodense focus, too small to characterize. No hydronephrosis. Left kidney is normal.\par \par BLADDER: Within normal limits.\par REPRODUCTIVE ORGANS: Uterus and adnexa within normal limits.\par \par The patient produced a urine sample which will be sent for urinalysis and urine culture. \par \par I recommend that she calls on 5/5/21 to check the results of the urine tests. \par If there is blood in the urine, we will schedule her for further testing. \par \par

## 2021-05-04 ENCOUNTER — APPOINTMENT (OUTPATIENT)
Dept: ORTHOPEDIC SURGERY | Facility: CLINIC | Age: 51
End: 2021-05-04

## 2021-05-04 ENCOUNTER — APPOINTMENT (OUTPATIENT)
Dept: PHYSICAL MEDICINE AND REHAB | Facility: CLINIC | Age: 51
End: 2021-05-04
Payer: MEDICAID

## 2021-05-04 ENCOUNTER — NON-APPOINTMENT (OUTPATIENT)
Age: 51
End: 2021-05-04

## 2021-05-04 VITALS — BODY MASS INDEX: 33.29 KG/M2 | HEIGHT: 64 IN | WEIGHT: 195 LBS

## 2021-05-04 LAB
APPEARANCE: ABNORMAL
BACTERIA: NEGATIVE
BILIRUBIN URINE: NEGATIVE
BLOOD URINE: NORMAL
CALCIUM OXALATE CRYSTALS: ABNORMAL
COLOR: YELLOW
GLUCOSE QUALITATIVE U: NEGATIVE
HYALINE CASTS: 0 /LPF
KETONES URINE: NEGATIVE
LEUKOCYTE ESTERASE URINE: NEGATIVE
MICROSCOPIC-UA: NORMAL
NITRITE URINE: NEGATIVE
PH URINE: 6
PROTEIN URINE: NORMAL
RED BLOOD CELLS URINE: 0 /HPF
SPECIFIC GRAVITY URINE: 1.03
SQUAMOUS EPITHELIAL CELLS: 1 /HPF
UROBILINOGEN URINE: NORMAL
WHITE BLOOD CELLS URINE: 1 /HPF

## 2021-05-04 PROCEDURE — 99204 OFFICE O/P NEW MOD 45 MIN: CPT

## 2021-05-04 PROCEDURE — 99214 OFFICE O/P EST MOD 30 MIN: CPT

## 2021-05-04 PROCEDURE — 99072 ADDL SUPL MATRL&STAF TM PHE: CPT

## 2021-05-05 ENCOUNTER — TRANSCRIPTION ENCOUNTER (OUTPATIENT)
Age: 51
End: 2021-05-05

## 2021-05-05 LAB — BACTERIA UR CULT: NORMAL

## 2021-05-05 NOTE — PHYSICAL EXAM
[FreeTextEntry1] : Gen: NAD\par HEENT: neck supple\par CV: no cyanosis\par Pulm: breathing well on room air\par Abd: soft\par Low back: range of motion limited by pain, tenderness to palpation lower lumbar paraspinals, neg straight leg raise, neg FABERE, neg FAIR\par Msk: \par 5/5 hip flexion B/L, 5/5 knee extension B/L, 5/5 knee flexion B/L, 5/5 dorsiflexion B/L, 5/5 EHL B/L, 5/5 plantar flexion B/L\par 5/5 shoulder abduction B/L, 5/5 elbow flexion B/L, 5/5 elbow extension B/L, 5/5 wrist extension B/L, 5/5 hand  B/L\par Neuro: sensation intact to light touch in bilateral upper and lower extremities, reflexes 2+ brachioradialis, biceps, triceps bilaterally, reflexes 2+ patella, medial hamstring, achilles bilaterally, negative babinski, negative angeles\par

## 2021-05-05 NOTE — HISTORY OF PRESENT ILLNESS
[FreeTextEntry1] : 52 yo F with PMH breast CA s/p lumpectomy now on letrozole, psoriatic arthritis, cervical spondylosis who presents with low back pain.\par \par Onset: one week ago after lifting heavy object.  No inciting events, trauma, or falls.\par Location: lower left lumbar spine\par Characteristics: sharp \par Aggravating factors: prolonged sitting, standing, walking, bending forward\par Alleviating factors: rest\par Radiation: denies\par Treatments: tylenol, ibuprofen with some temporary relief.  No PT/HEP.  No recent injections.  No surgeries.  Patient had been seen by Dr. Martinez for cervical radiculopathy and was not aware that she also treats low back pain.\par Severity: 7-9/10\par \par Diagnostic studies:\par MRI thoracic spine w/o contrast mild multilevel spondylosis\par MRI cervical spine w/o contrast showing mild multilevel spondylosis most pronounced at C4-C5\par No previous EMG/NCS showing no abnormalities\par \par Patient denies new weakness, numbness or paresthesia.  Patient denies bowel/bladder dysfunction, fevers, chills, weight loss, night pain, or night sweats.\par

## 2021-05-05 NOTE — ASSESSMENT
[FreeTextEntry1] : 50 yo F who presents with acute on chronic low back pain consistent with lumbar spondylosis, lumbar disc degeneration and lumbar strain.\par \par Patient previously seen by Dr. Martinez for neck pain.  Patient reports that she was pleased with the care that she received from Dr. Martinez in the past for her cervical spine complaints.  After a review of the EMR, I attest that Ms. Hughes has received excellent care under the supervision of Dr. Martinez.  Ms. Hughes reports that she was not aware that Dr. Martinez also treats low back pain and therefore made the decision to schedule an appointment with me.  To ensure continuity of care, I have referred patient back to Dr. Martinez for further treatment and management of her low back complaints.  Should practical matters serve to make this plan untenable (insurance coverage, scheduling difficulty, etc.), I reassured Ms. Hughes that I am available for further visits if needed.\par \par -Int. Med. and Neurosurgery notes reviewed\par -Start medrol dose pack, dispense 1 pack.  Patient warned to avoid use of PO NSAIDS while on oral steroids.  \par -Start methocarbamol 500 mg PO qHS prn pain.  Patient warned of the sedating nature of the medication and instructed not to drive or handle heavy machinery.\par -RTC prn.  If pain persists or worsen despite compliance with above, then will consider MRI lumbar spine w/o contrast at next visit.\par \par Hugo Calderon MD\par Spine and Sports Medicine\par \par [employer]\par At [facility]\par \par \par

## 2021-05-07 ENCOUNTER — TRANSCRIPTION ENCOUNTER (OUTPATIENT)
Age: 51
End: 2021-05-07

## 2021-05-10 ENCOUNTER — APPOINTMENT (OUTPATIENT)
Dept: ORTHOPEDIC SURGERY | Facility: CLINIC | Age: 51
End: 2021-05-10

## 2021-05-11 ENCOUNTER — EMERGENCY (EMERGENCY)
Facility: HOSPITAL | Age: 51
LOS: 1 days | Discharge: HOME | End: 2021-05-11
Attending: EMERGENCY MEDICINE
Payer: MEDICAID

## 2021-05-11 ENCOUNTER — APPOINTMENT (OUTPATIENT)
Dept: DERMATOLOGY | Facility: CLINIC | Age: 51
End: 2021-05-11

## 2021-05-11 ENCOUNTER — APPOINTMENT (OUTPATIENT)
Dept: ORTHOPEDIC SURGERY | Facility: CLINIC | Age: 51
End: 2021-05-11

## 2021-05-11 VITALS
HEIGHT: 63 IN | RESPIRATION RATE: 18 BRPM | DIASTOLIC BLOOD PRESSURE: 72 MMHG | HEART RATE: 112 BPM | SYSTOLIC BLOOD PRESSURE: 108 MMHG | WEIGHT: 194.01 LBS | OXYGEN SATURATION: 99 % | TEMPERATURE: 98 F

## 2021-05-11 VITALS
HEART RATE: 91 BPM | DIASTOLIC BLOOD PRESSURE: 73 MMHG | RESPIRATION RATE: 18 BRPM | SYSTOLIC BLOOD PRESSURE: 122 MMHG | OXYGEN SATURATION: 95 % | TEMPERATURE: 98 F

## 2021-05-11 DIAGNOSIS — Z90.10 ACQUIRED ABSENCE OF UNSPECIFIED BREAST AND NIPPLE: Chronic | ICD-10-CM

## 2021-05-11 DIAGNOSIS — Z90.722 ACQUIRED ABSENCE OF OVARIES, BILATERAL: Chronic | ICD-10-CM

## 2021-05-11 LAB
ALBUMIN SERPL ELPH-MCNC: 4.7 G/DL — SIGNIFICANT CHANGE UP (ref 3.3–5)
ALP SERPL-CCNC: 74 U/L — SIGNIFICANT CHANGE UP (ref 40–120)
ALT FLD-CCNC: 45 U/L — SIGNIFICANT CHANGE UP (ref 10–45)
ANION GAP SERPL CALC-SCNC: 16 MMOL/L — SIGNIFICANT CHANGE UP (ref 5–17)
APPEARANCE UR: ABNORMAL
AST SERPL-CCNC: 43 U/L — HIGH (ref 10–40)
BACTERIA # UR AUTO: NEGATIVE — SIGNIFICANT CHANGE UP
BASOPHILS # BLD AUTO: 0.04 K/UL — SIGNIFICANT CHANGE UP (ref 0–0.2)
BASOPHILS NFR BLD AUTO: 0.5 % — SIGNIFICANT CHANGE UP (ref 0–2)
BILIRUB SERPL-MCNC: 0.9 MG/DL — SIGNIFICANT CHANGE UP (ref 0.2–1.2)
BILIRUB UR-MCNC: NEGATIVE — SIGNIFICANT CHANGE UP
BUN SERPL-MCNC: 13 MG/DL — SIGNIFICANT CHANGE UP (ref 7–23)
CALCIUM SERPL-MCNC: 9.9 MG/DL — SIGNIFICANT CHANGE UP (ref 8.4–10.5)
CHLORIDE SERPL-SCNC: 108 MMOL/L — SIGNIFICANT CHANGE UP (ref 96–108)
CO2 SERPL-SCNC: 18 MMOL/L — LOW (ref 22–31)
COD CRY URNS QL: ABNORMAL
COLOR SPEC: YELLOW — SIGNIFICANT CHANGE UP
CREAT SERPL-MCNC: 0.7 MG/DL — SIGNIFICANT CHANGE UP (ref 0.5–1.3)
DIFF PNL FLD: ABNORMAL
EOSINOPHIL # BLD AUTO: 0.04 K/UL — SIGNIFICANT CHANGE UP (ref 0–0.5)
EOSINOPHIL NFR BLD AUTO: 0.5 % — SIGNIFICANT CHANGE UP (ref 0–6)
EPI CELLS # UR: 4 /HPF — SIGNIFICANT CHANGE UP
GLUCOSE SERPL-MCNC: 144 MG/DL — HIGH (ref 70–99)
GLUCOSE UR QL: NEGATIVE — SIGNIFICANT CHANGE UP
HCG SERPL-ACNC: <2 MIU/ML — SIGNIFICANT CHANGE UP
HCT VFR BLD CALC: 35.6 % — SIGNIFICANT CHANGE UP (ref 34.5–45)
HGB BLD-MCNC: 11.6 G/DL — SIGNIFICANT CHANGE UP (ref 11.5–15.5)
HYALINE CASTS # UR AUTO: 2 /LPF — SIGNIFICANT CHANGE UP (ref 0–2)
IMM GRANULOCYTES NFR BLD AUTO: 0.4 % — SIGNIFICANT CHANGE UP (ref 0–1.5)
KETONES UR-MCNC: NEGATIVE — SIGNIFICANT CHANGE UP
LEUKOCYTE ESTERASE UR-ACNC: NEGATIVE — SIGNIFICANT CHANGE UP
LYMPHOCYTES # BLD AUTO: 1.82 K/UL — SIGNIFICANT CHANGE UP (ref 1–3.3)
LYMPHOCYTES # BLD AUTO: 22.1 % — SIGNIFICANT CHANGE UP (ref 13–44)
MCHC RBC-ENTMCNC: 27.6 PG — SIGNIFICANT CHANGE UP (ref 27–34)
MCHC RBC-ENTMCNC: 32.6 GM/DL — SIGNIFICANT CHANGE UP (ref 32–36)
MCV RBC AUTO: 84.6 FL — SIGNIFICANT CHANGE UP (ref 80–100)
MONOCYTES # BLD AUTO: 0.47 K/UL — SIGNIFICANT CHANGE UP (ref 0–0.9)
MONOCYTES NFR BLD AUTO: 5.7 % — SIGNIFICANT CHANGE UP (ref 2–14)
NEUTROPHILS # BLD AUTO: 5.82 K/UL — SIGNIFICANT CHANGE UP (ref 1.8–7.4)
NEUTROPHILS NFR BLD AUTO: 70.8 % — SIGNIFICANT CHANGE UP (ref 43–77)
NITRITE UR-MCNC: NEGATIVE — SIGNIFICANT CHANGE UP
NRBC # BLD: 0 /100 WBCS — SIGNIFICANT CHANGE UP (ref 0–0)
PH UR: 6 — SIGNIFICANT CHANGE UP (ref 5–8)
PLATELET # BLD AUTO: 353 K/UL — SIGNIFICANT CHANGE UP (ref 150–400)
POTASSIUM SERPL-MCNC: 3.8 MMOL/L — SIGNIFICANT CHANGE UP (ref 3.5–5.3)
POTASSIUM SERPL-SCNC: 3.8 MMOL/L — SIGNIFICANT CHANGE UP (ref 3.5–5.3)
PROT SERPL-MCNC: 7.3 G/DL — SIGNIFICANT CHANGE UP (ref 6–8.3)
PROT UR-MCNC: ABNORMAL
RBC # BLD: 4.21 M/UL — SIGNIFICANT CHANGE UP (ref 3.8–5.2)
RBC # FLD: 13.1 % — SIGNIFICANT CHANGE UP (ref 10.3–14.5)
RBC CASTS # UR COMP ASSIST: 22 /HPF — HIGH (ref 0–4)
SARS-COV-2 RNA SPEC QL NAA+PROBE: SIGNIFICANT CHANGE UP
SODIUM SERPL-SCNC: 142 MMOL/L — SIGNIFICANT CHANGE UP (ref 135–145)
SP GR SPEC: 1.04 — HIGH (ref 1.01–1.02)
UROBILINOGEN FLD QL: ABNORMAL
WBC # BLD: 8.22 K/UL — SIGNIFICANT CHANGE UP (ref 3.8–10.5)
WBC # FLD AUTO: 8.22 K/UL — SIGNIFICANT CHANGE UP (ref 3.8–10.5)
WBC UR QL: 4 /HPF — SIGNIFICANT CHANGE UP (ref 0–5)

## 2021-05-11 PROCEDURE — 71250 CT THORAX DX C-: CPT | Mod: 26,MA

## 2021-05-11 PROCEDURE — U0005: CPT

## 2021-05-11 PROCEDURE — 85025 COMPLETE CBC W/AUTO DIFF WBC: CPT

## 2021-05-11 PROCEDURE — 72131 CT LUMBAR SPINE W/O DYE: CPT | Mod: 26,MA

## 2021-05-11 PROCEDURE — 99285 EMERGENCY DEPT VISIT HI MDM: CPT

## 2021-05-11 PROCEDURE — 72128 CT CHEST SPINE W/O DYE: CPT | Mod: 26,MA

## 2021-05-11 PROCEDURE — 99284 EMERGENCY DEPT VISIT MOD MDM: CPT | Mod: 25

## 2021-05-11 PROCEDURE — 81001 URINALYSIS AUTO W/SCOPE: CPT

## 2021-05-11 PROCEDURE — 80053 COMPREHEN METABOLIC PANEL: CPT

## 2021-05-11 PROCEDURE — 84702 CHORIONIC GONADOTROPIN TEST: CPT

## 2021-05-11 PROCEDURE — 86140 C-REACTIVE PROTEIN: CPT

## 2021-05-11 PROCEDURE — 74176 CT ABD & PELVIS W/O CONTRAST: CPT | Mod: 26,MA

## 2021-05-11 PROCEDURE — 71250 CT THORAX DX C-: CPT

## 2021-05-11 PROCEDURE — 96374 THER/PROPH/DIAG INJ IV PUSH: CPT

## 2021-05-11 PROCEDURE — U0003: CPT

## 2021-05-11 PROCEDURE — 74176 CT ABD & PELVIS W/O CONTRAST: CPT

## 2021-05-11 PROCEDURE — 85652 RBC SED RATE AUTOMATED: CPT

## 2021-05-11 RX ORDER — OXYCODONE HYDROCHLORIDE 5 MG/1
1 TABLET ORAL
Qty: 28 | Refills: 0
Start: 2021-05-11 | End: 2021-05-17

## 2021-05-11 RX ORDER — MORPHINE SULFATE 50 MG/1
4 CAPSULE, EXTENDED RELEASE ORAL ONCE
Refills: 0 | Status: DISCONTINUED | OUTPATIENT
Start: 2021-05-11 | End: 2021-05-11

## 2021-05-11 RX ORDER — ACETAMINOPHEN 500 MG
975 TABLET ORAL ONCE
Refills: 0 | Status: COMPLETED | OUTPATIENT
Start: 2021-05-11 | End: 2021-05-11

## 2021-05-11 RX ORDER — DIAZEPAM 5 MG
5 TABLET ORAL ONCE
Refills: 0 | Status: DISCONTINUED | OUTPATIENT
Start: 2021-05-11 | End: 2021-05-11

## 2021-05-11 RX ADMIN — Medication 975 MILLIGRAM(S): at 09:28

## 2021-05-11 RX ADMIN — MORPHINE SULFATE 4 MILLIGRAM(S): 50 CAPSULE, EXTENDED RELEASE ORAL at 10:05

## 2021-05-11 NOTE — ED PROVIDER NOTE - CLINICAL SUMMARY MEDICAL DECISION MAKING FREE TEXT BOX
59 y/o female PMHx metastatic breast cancer s/p chemo and radiation mastectomy right side, second visit to ER for back pain and rib pain. Negative PE work up 04/27/21 still concerning for bony mets. Will obtain blood work, provide pain meds, bony CT chest ribs, thoracic, LS, pelvis and reassess ~ZR

## 2021-05-11 NOTE — ED ADULT NURSE NOTE - OBJECTIVE STATEMENT
52 y/o female PMH breast cancer presents to ED reporting flank pain. 52 y/o female PMH breast cancer presents to ED reporting flank pain. Pt reports L sided back pain. Pt reports being in ED recently and was discharged to follow-up with urology. Pt saw urologist who stated pt did not have kidney stone. Pt also saw orthopedic doctor/pain management doctor who prescribed patient muscle relaxant without relief and stated it could be muscular pain. Pt reports to ED today as pain is unrelieved since 0300am. Pt reports being more comfortable sitting up and walking around. On exam, AOx3, speaking in complete sentences. Unlabored, spontaneous respirations, NAD, O2 sat 100% RA. Abdomen soft, non-tender, non-distended. Pt denies numbness/tingling in extremities and loss of bladder or bowel function. Pt denies CP, SOB, n/v/d, fever/chills at this time. Heplock placed, labs sent. MD at bedside to evaluate pt. Awaiting imaging at this time, pt aware of plan of care at this time.

## 2021-05-11 NOTE — ED PROVIDER NOTE - NSFOLLOWUPINSTRUCTIONS_ED_ALL_ED_FT
Follow up with your Primary Care Physician within the next 2-3 days  Bring a copy of your test results with you to your appointment  Continue your current medication regimen  Return to the Emergency Room if you experience new or worsening symptoms abdominal pain, nausea, vomiting, fever chills, cough, chest pain, shortness of breath, dizziness, slurred speech, weakness, gait abnormality   You have abnormal CT scan which will require follow up with your oncologist for MRI  Please make an appointment with your oncologist [facility]  Take Tylenol 1000mg every 6 hours as needed for pain and alternate with NSAIDs such as Motirn Aleve Advil 400mg every 8 hours with food. if no improvement of pain take oxycodone 5mg every 6 hours as needed for pain. do not drive or operate heavy machinery while taking oxycodone. do not consume alcohol while taking oxycodone

## 2021-05-11 NOTE — ED ADULT NURSE NOTE - NSIMPLEMENTINTERV_GEN_ALL_ED
Implemented All Fall Risk Interventions:  [place] to call system. Call bell, personal items and telephone within reach. Instruct patient to call for assistance. Room bathroom lighting operational. Non-slip footwear when patient is off stretcher. Physically safe environment: no spills, clutter or unnecessary equipment. Stretcher in lowest position, wheels locked, appropriate side rails in place. Provide visual cue, wrist band, yellow gown, etc. Monitor gait and stability. Monitor for mental status changes and reorient to person, place, and time. Review medications for side effects contributing to fall risk. Reinforce activity limits and safety measures with patient and family.

## 2021-05-11 NOTE — ED PROVIDER NOTE - PATIENT PORTAL LINK FT
You can access the FollowMyHealth Patient Portal offered by [facility] by registering at the following website: [email or web address]. By joining [facility]’s FollowMyHealth portal, you will also be able to view your health information using other applications (apps) compatible with our system.

## 2021-05-11 NOTE — ED PROVIDER NOTE - OBJECTIVE STATEMENT
52 y/o female PMHx metastatic breast cancer 2000 s/p right mastectomy with breast implant with chemo on femara oral (followed by [facility], negative PET CT 1/2021), recurrence 2009 with mets to sacrum require XRT, psoriatic arthritis psoriasis follow by rheum clinic has been complaining of back pain and b/l rib chest pain for the last few weeks. Seen in ED at [facility] on 4/27/21 had negative CTA C/A/P. Patient seen by urology concerning for kidney stone (negative UA and stone hunt CT negative). Continued to have pain, seen by ortho who thought pain was likely 2/2 [facility] and started on muscle relaxant. Was told by ortho if pain continued to have MRI. Pt continued to have pain and not able to lie down flat since 1AM today. No fever or chills. No bowel or urinary incontinence.

## 2021-05-11 NOTE — ED PROVIDER NOTE - PHYSICAL EXAMINATION
[facility]: TTP over thoracic spine midline and b/l rib cage  Multiple psoriatic plaques on back  Implant right breast

## 2021-05-12 ENCOUNTER — APPOINTMENT (OUTPATIENT)
Dept: INTERNAL MEDICINE | Facility: CLINIC | Age: 51
End: 2021-05-12
Payer: MEDICAID

## 2021-05-12 ENCOUNTER — APPOINTMENT (OUTPATIENT)
Dept: ORTHOPEDIC SURGERY | Facility: CLINIC | Age: 51
End: 2021-05-12
Payer: MEDICAID

## 2021-05-12 ENCOUNTER — APPOINTMENT (OUTPATIENT)
Dept: NEUROSURGERY | Facility: CLINIC | Age: 51
End: 2021-05-12

## 2021-05-12 DIAGNOSIS — M75.82 OTHER SHOULDER LESIONS, LEFT SHOULDER: ICD-10-CM

## 2021-05-12 PROCEDURE — 99442: CPT

## 2021-05-12 PROCEDURE — 99213 OFFICE O/P EST LOW 20 MIN: CPT | Mod: 25

## 2021-05-12 PROCEDURE — 73030 X-RAY EXAM OF SHOULDER: CPT | Mod: LT

## 2021-05-12 PROCEDURE — 20610 DRAIN/INJ JOINT/BURSA W/O US: CPT | Mod: LT

## 2021-05-12 NOTE — HISTORY OF PRESENT ILLNESS
[de-identified] : Lida Hughes, 51 year old female presents today for evaluation of left shoulder pain. Patient states that pain has been for present over 1 week and has progressively worsened. Denies prior injury or trauma to shoulder. She states that she has been suffering from severe back pain and in unsure if this is related. She is unable to complete overhead activities at this time. She has been evaluated by multiple providers for the back pain, pending MRI results. She is having difficulty sleeping due to pain. She has tried Oxycodone, rest, ice without relief. Denies numbness, tingling, or radiating symptoms. She is here for further evaluation and treatment today.  [2] : a minimum pain level of 2/10 [8] : a maximum pain level of 8/10 [Acetaminophen] : not relieved by acetaminophen [Exercise Regimen] : not relieved by exercise regimen [NSAIDs] : relieved by nonsteroidal anti-inflammatory drugs

## 2021-05-12 NOTE — PHYSICAL EXAM
[Normal RUE] : Right Upper Extremity: No scars, rashes, lesions, ulcers, skin intact [Normal Finger/nose] : finger to nose coordination [Normal Touch] : sensation intact for touch [Poor Appearance] : well-appearing [Obese] : obese [Normal] : no peripheral adenopathy appreciated [de-identified] : Patient appears her stated age in no acute distress gait exam is normal\par \par Right shoulder exam\par Skin is normal. Forward flexion and abduction passive is 0-1 cm degrees. Active is 0-1 cm. Pain-free. No localized point tenderness. Impingement test is negative. Drop arm test is negative. No tenderness in the a.c. joint area. Internal rotation is T12 external rotation is 0-90° \par Abduction strength is 5 out of 5.\par Left shoulder exam\par \par \par \par Skin is normal. Active range of motion active abduction from 0-60° is painful.\par 4 flexion zero 60s painful. Passive range of motion 0-100 is not painful. Internal rotation is the L5. External rotation 0-60 painful. Passive external rotation is pain-free.\par Drop arm test is positive. Belly press test is negative.\par Distal neurovascular exam is normal.\par Bilateral elbow exam\par Skin is normal. Range of motion 0-170. No evidence of impingement nor localized bony tenderness. Good mediolateral stability.\par Distal neurovascular exam is intact with good radial and ulnar pulse. sensation is intact. Flexion and extension strength is 5 over 5 [de-identified] : X-rays of the left shoulder shows well-preserved joint space no bony abnormalities.

## 2021-05-19 ENCOUNTER — APPOINTMENT (OUTPATIENT)
Dept: RHEUMATOLOGY | Facility: CLINIC | Age: 51
End: 2021-05-19
Payer: MEDICAID

## 2021-05-19 VITALS
DIASTOLIC BLOOD PRESSURE: 83 MMHG | RESPIRATION RATE: 16 BRPM | BODY MASS INDEX: 33.29 KG/M2 | SYSTOLIC BLOOD PRESSURE: 134 MMHG | OXYGEN SATURATION: 97 % | HEIGHT: 64 IN | TEMPERATURE: 98.4 F | WEIGHT: 195 LBS | HEART RATE: 100 BPM

## 2021-05-19 PROCEDURE — 99072 ADDL SUPL MATRL&STAF TM PHE: CPT

## 2021-05-19 PROCEDURE — 99214 OFFICE O/P EST MOD 30 MIN: CPT

## 2021-05-19 RX ORDER — METHYLPREDNISOLONE 4 MG/1
4 TABLET ORAL
Qty: 1 | Refills: 0 | Status: DISCONTINUED | COMMUNITY
Start: 2021-05-04 | End: 2021-05-19

## 2021-05-20 ENCOUNTER — TRANSCRIPTION ENCOUNTER (OUTPATIENT)
Age: 51
End: 2021-05-20

## 2021-05-21 ENCOUNTER — APPOINTMENT (OUTPATIENT)
Dept: NEUROSURGERY | Facility: CLINIC | Age: 51
End: 2021-05-21

## 2021-05-21 ENCOUNTER — TRANSCRIPTION ENCOUNTER (OUTPATIENT)
Age: 51
End: 2021-05-21

## 2021-05-25 ENCOUNTER — OUTPATIENT (OUTPATIENT)
Dept: OUTPATIENT SERVICES | Facility: HOSPITAL | Age: 51
LOS: 1 days | End: 2021-05-25
Payer: MEDICAID

## 2021-05-25 ENCOUNTER — APPOINTMENT (OUTPATIENT)
Dept: INTERNAL MEDICINE | Facility: CLINIC | Age: 51
End: 2021-05-25

## 2021-05-25 ENCOUNTER — APPOINTMENT (OUTPATIENT)
Dept: MRI IMAGING | Facility: CLINIC | Age: 51
End: 2021-05-25
Payer: MEDICAID

## 2021-05-25 ENCOUNTER — APPOINTMENT (OUTPATIENT)
Dept: MRI IMAGING | Facility: CLINIC | Age: 51
End: 2021-05-25

## 2021-05-25 DIAGNOSIS — C50.919 MALIGNANT NEOPLASM OF UNSPECIFIED SITE OF UNSPECIFIED FEMALE BREAST: ICD-10-CM

## 2021-05-25 DIAGNOSIS — Z90.10 ACQUIRED ABSENCE OF UNSPECIFIED BREAST AND NIPPLE: Chronic | ICD-10-CM

## 2021-05-25 DIAGNOSIS — Z00.8 ENCOUNTER FOR OTHER GENERAL EXAMINATION: ICD-10-CM

## 2021-05-25 DIAGNOSIS — Z90.722 ACQUIRED ABSENCE OF OVARIES, BILATERAL: Chronic | ICD-10-CM

## 2021-05-25 DIAGNOSIS — M54.5 LOW BACK PAIN: ICD-10-CM

## 2021-05-25 DIAGNOSIS — M53.3 SACROCOCCYGEAL DISORDERS, NOT ELSEWHERE CLASSIFIED: ICD-10-CM

## 2021-05-25 PROCEDURE — 72147 MRI CHEST SPINE W/DYE: CPT

## 2021-05-25 PROCEDURE — 72149 MRI LUMBAR SPINE W/DYE: CPT | Mod: 26

## 2021-05-25 PROCEDURE — 72147 MRI CHEST SPINE W/DYE: CPT | Mod: 26

## 2021-05-25 PROCEDURE — 72149 MRI LUMBAR SPINE W/DYE: CPT

## 2021-06-01 ENCOUNTER — APPOINTMENT (OUTPATIENT)
Dept: ORTHOPEDIC SURGERY | Facility: CLINIC | Age: 51
End: 2021-06-01
Payer: MEDICAID

## 2021-06-01 ENCOUNTER — TRANSCRIPTION ENCOUNTER (OUTPATIENT)
Age: 51
End: 2021-06-01

## 2021-06-01 VITALS — HEIGHT: 64 IN | WEIGHT: 195 LBS | BODY MASS INDEX: 33.29 KG/M2

## 2021-06-01 PROCEDURE — 99214 OFFICE O/P EST MOD 30 MIN: CPT

## 2021-06-01 NOTE — HISTORY OF PRESENT ILLNESS
[de-identified] : This is a 51-year-old female here today for evaluation of approximately 1 month of mid back, left flank pain with left lower extremity spasms.  Unfortunately the symptoms have not gotten better over the past 4 weeks.  She is still in debilitating pain.  She does find it painful to sit in certain positions.  She is very fearful that her spasms will begin if she is in the wrong position.  She denies any bowel bladder incontinence, saddle anesthesia.  She has been to several providers for this discomfort but unfortunately her symptoms have persisted.  She is on tizanidine, diclofenac but unfortunately her symptoms continue.

## 2021-06-01 NOTE — PHYSICAL EXAM
[de-identified] : Lumbar Physical Exam\par \par Gait - Normal\par \par Station - Normal\par \par Sagittal balance - Normal\par \par Compensatory mechanism? - None\par \par Heel walk - Normal\par \par Toe walk - Normal\par \par Reflexes\par Patellar - normal\par Gastroc - normal\par Clonus - No\par \par Hip Exam - Normal\par \par Straight leg raise - none\par \par Pulses - 2+ dp/pt\par \par Range of motion - normal\par \par Sensation \par Sensation intact to light touch in L1, L2, L3, L4, L5 and S1 dermatomes bilaterally\par \par Motor\par 	IP	Quad	HS	TA	Gastroc	EHL\par Right	5/5	5/5	5/5	5/5	5/5	5/5\par Left	5/5	5/5	5/5	5/5	5/5	5/5\par \par Tandem gait stable, normal [de-identified] : Lumbar MRI reviewed\par No areas of critical central stenosis\par No areas of critical foraminal stenosis\par Significant lumbar spondylosis noted\par \par Thoracic MRI reviewed\par Several areas of thoracic spondylosis noted\par No areas of critical central stenosis\par No cord level compression\par I do not note any foraminal stenosis either

## 2021-06-01 NOTE — ASSESSMENT
[FreeTextEntry1] : I had a lengthy discussion with the patient in regards to her treatment plan and diagnosis.  At this point she has no red flag symptoms on her clinical exam nor does she have any red flag symptoms in her imaging.  This is a good thing although clearly frustrating for the patient given the amount of discomfort she is in.  She very well could have back pain from her lumbar and thoracic spondylosis.  The best treatment for this would be conservative.  She already has an established relationship with Dr. Martinez and as a result I will have her follow-up with this physician to discuss further treatment such as an injection.  I will also start her on gabapentin to help with what might be neuropathic pain.  I will have her follow-up in approximately 3 to 4 weeks for repeat clinical evaluation.  I encouraged her to follow-up sooner if she has any new or worsening symptoms.  Please note that over 30 minutes time spent in care of this patient which includes previsit preparation, in person visit, post visit documentation.

## 2021-06-03 ENCOUNTER — APPOINTMENT (OUTPATIENT)
Dept: CARDIOLOGY | Facility: CLINIC | Age: 51
End: 2021-06-03

## 2021-06-04 ENCOUNTER — TRANSCRIPTION ENCOUNTER (OUTPATIENT)
Age: 51
End: 2021-06-04

## 2021-06-05 ENCOUNTER — TRANSCRIPTION ENCOUNTER (OUTPATIENT)
Age: 51
End: 2021-06-05

## 2021-06-05 ENCOUNTER — OUTPATIENT (OUTPATIENT)
Dept: OUTPATIENT SERVICES | Facility: HOSPITAL | Age: 51
LOS: 1 days | End: 2021-06-05
Payer: MEDICAID

## 2021-06-05 ENCOUNTER — APPOINTMENT (OUTPATIENT)
Dept: RADIOLOGY | Facility: CLINIC | Age: 51
End: 2021-06-05
Payer: MEDICAID

## 2021-06-05 DIAGNOSIS — Z00.8 ENCOUNTER FOR OTHER GENERAL EXAMINATION: ICD-10-CM

## 2021-06-05 DIAGNOSIS — Z90.10 ACQUIRED ABSENCE OF UNSPECIFIED BREAST AND NIPPLE: Chronic | ICD-10-CM

## 2021-06-05 DIAGNOSIS — Z90.722 ACQUIRED ABSENCE OF OVARIES, BILATERAL: Chronic | ICD-10-CM

## 2021-06-05 DIAGNOSIS — M62.838 OTHER MUSCLE SPASM: ICD-10-CM

## 2021-06-05 PROCEDURE — 71100 X-RAY EXAM RIBS UNI 2 VIEWS: CPT

## 2021-06-05 PROCEDURE — 71100 X-RAY EXAM RIBS UNI 2 VIEWS: CPT | Mod: 26,LT

## 2021-06-07 ENCOUNTER — TRANSCRIPTION ENCOUNTER (OUTPATIENT)
Age: 51
End: 2021-06-07

## 2021-06-08 ENCOUNTER — TRANSCRIPTION ENCOUNTER (OUTPATIENT)
Age: 51
End: 2021-06-08

## 2021-06-08 DIAGNOSIS — M19.011 PRIMARY OSTEOARTHRITIS, RIGHT SHOULDER: ICD-10-CM

## 2021-06-09 ENCOUNTER — APPOINTMENT (OUTPATIENT)
Dept: INTERNAL MEDICINE | Facility: CLINIC | Age: 51
End: 2021-06-09

## 2021-06-15 ENCOUNTER — APPOINTMENT (OUTPATIENT)
Dept: NEUROSURGERY | Facility: CLINIC | Age: 51
End: 2021-06-15
Payer: MEDICAID

## 2021-06-15 DIAGNOSIS — M50.30 OTHER CERVICAL DISC DEGENERATION, UNSPECIFIED CERVICAL REGION: ICD-10-CM

## 2021-06-15 DIAGNOSIS — M47.816 SPONDYLOSIS W/OUT MYELOPATHY OR RADICULOPATHY, LUMBAR REGION: ICD-10-CM

## 2021-06-15 DIAGNOSIS — M54.12 RADICULOPATHY, CERVICAL REGION: ICD-10-CM

## 2021-06-15 DIAGNOSIS — M54.2 CERVICALGIA: ICD-10-CM

## 2021-06-15 PROCEDURE — 99214 OFFICE O/P EST MOD 30 MIN: CPT

## 2021-06-15 NOTE — ASSESSMENT
[FreeTextEntry1] : 51 year old female with neck pain, back pain, most likely myofascial in nature along with psoriatic arthritis.  I have explained to her that her pain is multifactorial and will need a multifaceted approach.   She will continue with tizanidine and begin a trial of gabapentin as prescribed by Dr. Ivan.  She will also begin PT tomorrow.  She will return to see me in one month to discuss her progress but certainly sooner with any questions or concerns.

## 2021-06-15 NOTE — REASON FOR VISIT
[Follow-Up: _____] : a [unfilled] follow-up visit [FreeTextEntry1] : Patient returns after being seen in November.  She has since developed pain in her lower back on April 27th after lifting [employer] boxes.  She has since had consultations with Dr. Calderon, Dr. Ivan.  She returns for discussion of her treatment options.  She has also had MRI lumbar spine.

## 2021-06-15 NOTE — DATA REVIEWED
[de-identified] : \par  MR Lumbar Spine w/ IV Cont             Final\par \par No Documents Attached\par \par \par \par \par   EXAM:  MR SPINE LUMBAR IC\par \par EXAM:  MR SPINE THORACIC IC\par \par \par PROCEDURE DATE:  05/25/2021\par \par \par \par INTERPRETATION:  LUMBOSACRAL SPINE MRI AND THORACIC SPINE MRI WITH AND WITHOUT CONTRAST\par \par CLINICAL INFORMATION: Lumbar and thoracic back pain. Breast cancer.\par TECHNIQUE: Multiplanar, multisequence MR imaging was obtained of the lumbosacral spine and thoracic spine with and without contrast. 9 cc of Gadavist was administered intravenously. Comparison is made to prior CT scan from 5/11/2021] prior MRI from 2/20/2021\par FINDINGS:\par \par Thoracic spine:\par \par DISC LEVEL EVALUATION: Mild multilevel disc bulging is noted throughout the mid thoracic spine with disc mildly indenting the ventral thecal sac. This is most prominent at T8-T9. This nearly contacts the ventral aspect of the cord. From T6 through T11 disc minimally indents the ventral thecal sac. Multilevel anterior osteophyte formation is present.\par SPINAL ALIGNMENT: Normal\par THORACIC CORD AND CONUS: Normal\par MARROW: There is a speckled appearing hypointense lesion within the T9 vertebral body with mild enhancement and minimal fatty signal within the lesion likely compatible with an atypical hemangioma and unchanged compared to prior study. There is no bone marrow edema or fracture.\par PARASPINAL MUSCLE AND SOFT TISSUES: Normal\par INTRATHORACIC SOFT TISSUES: Normal\par \par \par LUMBOSACRAL SPINE:\par \par DISC LEVEL EVALUATION:\par \par L1/L2: There is diffuse degeneration and mild circumferential disc bulging with small osteophyte formation. Disc mildly indents the ventral thecal sac without significant stenosis.\par L2/L3: Mild circumferential disc bulging is present with disc mildly indenting the ventral thecal sac. There is mild foraminal narrowing.\par L3/L4: Minimal disc bulging is present without central canal or foraminal narrowing.\par L4/L5: Normal\par L5/S1: There is very small disc protrusion without central canal or foraminal narrowing.\par \par SPINAL ALIGNMENT: Normal\par DISTAL CORD AND CONUS: The distal cord is normal in appearance. The conus terminates at L1 and is unremarkable.\par SI JOINTS: Normal\par MARROW: There is no bone marrow edema or fracture.\par PARASPINAL MUSCLE AND SOFT TISSUES: Normal\par INTRAABDOMINAL/INTRAPELVIC SOFT TISSUES: Normal\par \par There is no abnormal epidural or leptomeningeal enhancement.\par \par IMPRESSION: Mild multilevel thoracic and lumbar spondylosis.\par \par Unchanged lesion within the T9 vertebral body that appears compatible with an atypical hemangioma.\par \par No evidence of metastatic disease.\par \par \par \par \par \par \par ANGELITA DEVLIN MD; Attending Radiologist\par This document has been electronically signed. May 27 2021  3:30PM\par \par  \par \par  Ordered by: JAYRO VAUGHN       Collected/Examined: [id number] 06:37PM       \par Verified by: JAYRO VAUGHN 03Jun2021 10:47AM       \par  Result Communication: No patient communication needed at this time;\par Stage: Final       \par  Performed at: [facility] [place]       Resulted: [id number] 03:18PM       Last Updated: 03Jun2021 10:47AM       Accession: [id number]

## 2021-06-21 ENCOUNTER — TRANSCRIPTION ENCOUNTER (OUTPATIENT)
Age: 51
End: 2021-06-21

## 2021-06-24 ENCOUNTER — TRANSCRIPTION ENCOUNTER (OUTPATIENT)
Age: 51
End: 2021-06-24

## 2021-06-29 ENCOUNTER — TRANSCRIPTION ENCOUNTER (OUTPATIENT)
Age: 51
End: 2021-06-29

## 2021-06-30 ENCOUNTER — APPOINTMENT (OUTPATIENT)
Dept: ORTHOPEDIC SURGERY | Facility: CLINIC | Age: 51
End: 2021-06-30
Payer: MEDICAID

## 2021-06-30 VITALS
HEART RATE: 101 BPM | OXYGEN SATURATION: 96 % | SYSTOLIC BLOOD PRESSURE: 119 MMHG | DIASTOLIC BLOOD PRESSURE: 80 MMHG | WEIGHT: 194 LBS | BODY MASS INDEX: 33.12 KG/M2 | HEIGHT: 64 IN

## 2021-06-30 PROCEDURE — 99213 OFFICE O/P EST LOW 20 MIN: CPT

## 2021-06-30 PROCEDURE — 73030 X-RAY EXAM OF SHOULDER: CPT | Mod: RT

## 2021-06-30 NOTE — ASSESSMENT
[FreeTextEntry1] : I had a long discussion with the patient in regards to her current symptoms.  She has diffuse back related pain.  I do not note any areas of critical central foraminal stenosis on her MRI images.  At this point I would refer her to neurology for further treatment.  She should continue to follow-up with  for any other treatment modalities.

## 2021-06-30 NOTE — HISTORY OF PRESENT ILLNESS
[de-identified] : Today the patient states that her symptoms are largely unchanged.  She has diffuse spine pain as well as right shoulder pain today.  She denies any bowel bladder issues.  She denies any saddle anesthesia.  She denies any issues with numerous recent falls, hand dexterity.\par \par 06/01/21\par This is a 51-year-old female here today for evaluation of approximately 1 month of mid back, left flank pain with left lower extremity spasms.  Unfortunately the symptoms have not gotten better over the past 4 weeks.  She is still in debilitating pain.  She does find it painful to sit in certain positions.  She is very fearful that her spasms will begin if she is in the wrong position.  She denies any bowel bladder incontinence, saddle anesthesia.  She has been to several providers for this discomfort but unfortunately her symptoms have persisted.  She is on tizanidine, diclofenac but unfortunately her symptoms continue.

## 2021-06-30 NOTE — PHYSICAL EXAM
[de-identified] : Cervical Physical Exam\par \par Gait - Normal\par \par Station - Normal\par \par Sagittal balance - Normal \par \par Compensatory mechanism? - None\par \par Horizontal gaze - Maintained\par \par Heel walk - Normal\par \par Toe walk - Normal\par \par Reflexes\par Biceps - Normal\par Triceps - Normal\par Brachioradialis - Normal\par Patellar - Normal\par Gastroc - Normal\par Clonus -No\par \par Duque´s - None\par \par Shoulder Exam - Normal\par \par Spurling´s - None\par \par Wrist Pulses -2+ radial/ulnar\par \par Foot Pulses -2+ DP/PT\par \par Cervical range of motion - Normal\par \par Sensation \par C5-T1 sensation intact to light touch bilaterally\par \par L1-S1 sensation intact to light touch bilaterally\par \par Motor\par \par \par 	Deltoid	Biceps	Triceps	WF	WE	IO	\par Right	5/5	5/5	5/5	5/5	5/5	5/5	5/5\par Left	5/5	5/5	5/5	5/5	5/5	5/5	5/5\par \par \par 	IP	Quad	HS	TA	Gastroc	EHL\par Right	5/5	5/5	5/5	5/5	5/5	5/5\par Left	5/5	5/5	5/5	5/5	5/5	5/5 [de-identified] : Right shoulder radiographs\par Glenohumeral joint reduced\par No areas of arthritis\par Benign-appearing x-ray

## 2021-07-01 ENCOUNTER — TRANSCRIPTION ENCOUNTER (OUTPATIENT)
Age: 51
End: 2021-07-01

## 2021-07-02 ENCOUNTER — TRANSCRIPTION ENCOUNTER (OUTPATIENT)
Age: 51
End: 2021-07-02

## 2021-07-06 ENCOUNTER — RX RENEWAL (OUTPATIENT)
Age: 51
End: 2021-07-06

## 2021-07-06 ENCOUNTER — TRANSCRIPTION ENCOUNTER (OUTPATIENT)
Age: 51
End: 2021-07-06

## 2021-07-13 ENCOUNTER — APPOINTMENT (OUTPATIENT)
Dept: ORTHOPEDIC SURGERY | Facility: CLINIC | Age: 51
End: 2021-07-13
Payer: MEDICAID

## 2021-07-13 DIAGNOSIS — M75.41 IMPINGEMENT SYNDROME OF RIGHT SHOULDER: ICD-10-CM

## 2021-07-13 PROCEDURE — 20610 DRAIN/INJ JOINT/BURSA W/O US: CPT | Mod: RT

## 2021-07-13 PROCEDURE — 99214 OFFICE O/P EST MOD 30 MIN: CPT | Mod: 25

## 2021-07-13 NOTE — CONSULT LETTER
[Dear  ___] : Dear  [unfilled], [Consult Letter:] : I had the pleasure of evaluating your patient, [unfilled]. [Please see my note below.] : Please see my note below. [Consult Closing:] : Thank you very much for allowing me to participate in the care of this patient.  If you have any questions, please do not hesitate to contact me. [Sincerely,] : Sincerely, [FreeTextEntry3] : Dr. Derik Kumar \par \par

## 2021-07-13 NOTE — HISTORY OF PRESENT ILLNESS
[de-identified] : DANIELLA CALVIN is a 51 year old RHD female presenting to the office complaining of bilateral shoulder pain. Patient reports pain since May 2021. Patient denies injury or trauma to the area. She notes the pain has progressively been worsening overtime. She saw Dr. Razo on 05/12/2021 who gave her a corticosteroid injection of the left shoulder noting good temporary relief in symptoms. The patient describes the pain as a dull aching, and occasionally sharp pain localized to the anterior aspect of her bilateral shoulders that is intermittent in nature. Her  symptoms are exacerbated with any movement of the shoulder. Patient reports the pain is not waking her up at night.  Patient reports associated weakness. Denies numbness and tingling in the upper extremity.  Patient has completed a home exercise program noting minimal improvements in strength and range of motion and pain. Patient has a PMHx of psoriatic arthritis and breast cancer. Her rheumatologist plans to start her on Stelara soon. She is currently under the care of Dr. Calderon and Dr. Ivan for her low back pain. Patient is taking NSAIDs, tizanidine for pain relief with mild to moderate relief in symptoms. Patient denies any other complaints at this time.

## 2021-07-13 NOTE — DISCUSSION/SUMMARY
[de-identified] : The underlying pathophysiology was reviewed in great detail with the patient as well as the various treatment options, including ice, analgesics, NSAIDs, Physical therapy, steroid injections.\par \par Patient received a corticosteroid injection of the right shoulder today. \par \par A prescription for Indocin was provided. \par \par A prescription for Physical Therapy was provided.\par \par  A home exercise sheet was given and discussed with the patient to follow.A Thera-Band was provided for exercise program. \par \par Discussed continued treatment with rheumatologist for Psoriasis arthritis. \par \par Activity modifications and restrictions were discussed. I advised avoiding overhead lifting. I advised the patient to work on good posture.\par \par FU 6 weeks. \par \par All questions were answered, all alternatives discussed and the patient is in complete agreement with that plan. Follow-up appointment as instructed. Any issues and the patient will call or come in sooner.

## 2021-07-13 NOTE — PROCEDURE
[de-identified] : At this point I recommended a therapeutic injection and under sterile precautions an injection of 4 cc 1% lidocaine with 0.5 cc of Kenalog and 0.5 cc of Dexamethasone- was placed into the subacromial space of the Right shoulder without complication, and after several minutes, the patient felt significant relief.\par \par \par

## 2021-07-13 NOTE — PHYSICAL EXAM
[de-identified] : Right Upper Extremity\par o Shoulder :\par ¦ Inspection/Palpation :  tenderness over the greater tuberosity, no acromioclavicular joint tenderness, no tenderness anterior and posterior glenohumeral joint,no swelling, no deformities\par ¦ Range of Motion : ACTIVE FORWARD ELEVATION: Measured at 110 degrees, ACTIVE EXTERNAL ROTATION: Measured at 30 degrees, ACTIVE INTERNAL ROTATION: Measured at T12\par ¦ Strength : external rotation 5/5, internal rotation 5/5, supraspinatus 5/5 with pain\par ¦ Stability : no joint instability on provocative testing\par ¦ Tests/Signs : Neer (-), Grimes (+)\par o Upper Arm : no tenderness, no swelling, no deformities\par o Muscle Bulk : no atrophy\par o Sensation : sensation intact to light touch\par o Skin : no skin rash or discoloration\par o Vascular Exam : no edema, no cyanosis, radial and ulnar pulses normal\par \par Left Upper Extremity\par o Shoulder :\par ¦ Inspection/Palpation :  tenderness over the greater tuberosity, no acromioclavicular joint tenderness, no tenderness anterior and posterior glenohumeral joint,no swelling, no deformities\par ¦ Range of Motion : ACTIVE FORWARD ELEVATION: Measured at 95 degrees, ACTIVE EXTERNAL ROTATION: Measured at 40 degrees, ACTIVE INTERNAL ROTATION: Measured at L1\par ¦ Strength : external rotation 5/5, internal rotation 5/5, supraspinatus 5/5 with pain\par ¦ Stability : no joint instability on provocative testing\par ¦ Tests/Signs : Neer (-), Grimes (+)\par o Upper Arm : no tenderness, no swelling, no deformities\par o Muscle Bulk : no atrophy\par o Sensation : sensation intact to light touch\par o Skin : no skin rash or discoloration\par o Vascular Exam : no edema, no cyanosis, radial and ulnar pulses normal\par  [de-identified] : Patient comes to today's visit with outside imaging already performed. I reviewed the images in detail with the patient and discussed the findings as highlighted below.\par \par o Xrays of the right shoulder performed in office by Dr. Ivan on 06/30/2021: impression\par ¦  no soft tissue abnormalities, no fractures, alignment is normal, normal appearing joint spaces, normal bone density, no bony lesions.\par \par o Xrays of the left shoulder performed in office by Dr. Razo on 05/12/2021: impression\par ¦  no soft tissue abnormalities, no fractures, alignment is normal, normal appearing joint spaces, normal bone density, no bony lesions.\par \par

## 2021-07-15 ENCOUNTER — APPOINTMENT (OUTPATIENT)
Dept: ORTHOPEDIC SURGERY | Facility: CLINIC | Age: 51
End: 2021-07-15

## 2021-07-16 ENCOUNTER — TRANSCRIPTION ENCOUNTER (OUTPATIENT)
Age: 51
End: 2021-07-16

## 2021-07-21 ENCOUNTER — APPOINTMENT (OUTPATIENT)
Dept: RHEUMATOLOGY | Facility: CLINIC | Age: 51
End: 2021-07-21

## 2021-07-23 ENCOUNTER — TRANSCRIPTION ENCOUNTER (OUTPATIENT)
Age: 51
End: 2021-07-23

## 2021-07-27 ENCOUNTER — APPOINTMENT (OUTPATIENT)
Dept: RHEUMATOLOGY | Facility: CLINIC | Age: 51
End: 2021-07-27
Payer: MEDICAID

## 2021-07-27 VITALS
BODY MASS INDEX: 33.12 KG/M2 | WEIGHT: 194 LBS | OXYGEN SATURATION: 97 % | HEIGHT: 64 IN | SYSTOLIC BLOOD PRESSURE: 121 MMHG | DIASTOLIC BLOOD PRESSURE: 78 MMHG | TEMPERATURE: 98 F | HEART RATE: 106 BPM | RESPIRATION RATE: 16 BRPM

## 2021-07-27 PROCEDURE — 96401 CHEMO ANTI-NEOPL SQ/IM: CPT

## 2021-07-27 PROCEDURE — 99214 OFFICE O/P EST MOD 30 MIN: CPT | Mod: 25

## 2021-07-28 ENCOUNTER — TRANSCRIPTION ENCOUNTER (OUTPATIENT)
Age: 51
End: 2021-07-28

## 2021-07-28 LAB
ALBUMIN SERPL ELPH-MCNC: 4.4 G/DL
ALP BLD-CCNC: 88 U/L
ALT SERPL-CCNC: 27 U/L
ANION GAP SERPL CALC-SCNC: 14 MMOL/L
AST SERPL-CCNC: 22 U/L
BASOPHILS # BLD AUTO: 0.03 K/UL
BASOPHILS NFR BLD AUTO: 0.4 %
BILIRUB SERPL-MCNC: 0.8 MG/DL
BUN SERPL-MCNC: 23 MG/DL
CALCIUM SERPL-MCNC: 9.9 MG/DL
CHLORIDE SERPL-SCNC: 108 MMOL/L
CO2 SERPL-SCNC: 26 MMOL/L
COVID-19 SPIKE DOMAIN ANTIBODY INTERPRETATION: POSITIVE
CREAT SERPL-MCNC: 0.9 MG/DL
CRP SERPL-MCNC: 30 MG/L
EOSINOPHIL # BLD AUTO: 0.15 K/UL
EOSINOPHIL NFR BLD AUTO: 1.9 %
ERYTHROCYTE [SEDIMENTATION RATE] IN BLOOD BY WESTERGREN METHOD: 64 MM/HR
GLUCOSE SERPL-MCNC: 91 MG/DL
HCT VFR BLD CALC: 36.6 %
HGB BLD-MCNC: 11.3 G/DL
IMM GRANULOCYTES NFR BLD AUTO: 0.4 %
LYMPHOCYTES # BLD AUTO: 1.85 K/UL
LYMPHOCYTES NFR BLD AUTO: 23 %
MAN DIFF?: NORMAL
MCHC RBC-ENTMCNC: 27 PG
MCHC RBC-ENTMCNC: 30.9 GM/DL
MCV RBC AUTO: 87.4 FL
MONOCYTES # BLD AUTO: 0.64 K/UL
MONOCYTES NFR BLD AUTO: 8 %
NEUTROPHILS # BLD AUTO: 5.34 K/UL
NEUTROPHILS NFR BLD AUTO: 66.3 %
PLATELET # BLD AUTO: 332 K/UL
POTASSIUM SERPL-SCNC: 4.8 MMOL/L
PROT SERPL-MCNC: 6.8 G/DL
RBC # BLD: 4.19 M/UL
RBC # FLD: 13.9 %
SARS-COV-2 AB SERPL IA-ACNC: >250 U/ML
SODIUM SERPL-SCNC: 147 MMOL/L
WBC # FLD AUTO: 8.04 K/UL

## 2021-07-29 ENCOUNTER — TRANSCRIPTION ENCOUNTER (OUTPATIENT)
Age: 51
End: 2021-07-29

## 2021-08-24 ENCOUNTER — LABORATORY RESULT (OUTPATIENT)
Age: 51
End: 2021-08-24

## 2021-08-24 ENCOUNTER — APPOINTMENT (OUTPATIENT)
Dept: INTERNAL MEDICINE | Facility: CLINIC | Age: 51
End: 2021-08-24
Payer: MEDICAID

## 2021-08-24 VITALS — WEIGHT: 195 LBS | SYSTOLIC BLOOD PRESSURE: 110 MMHG | DIASTOLIC BLOOD PRESSURE: 80 MMHG | BODY MASS INDEX: 33.47 KG/M2

## 2021-08-24 PROCEDURE — 99213 OFFICE O/P EST LOW 20 MIN: CPT | Mod: 25

## 2021-08-25 ENCOUNTER — APPOINTMENT (OUTPATIENT)
Dept: ULTRASOUND IMAGING | Facility: CLINIC | Age: 51
End: 2021-08-25

## 2021-08-25 ENCOUNTER — EMERGENCY (EMERGENCY)
Facility: HOSPITAL | Age: 51
LOS: 1 days | Discharge: HOME | End: 2021-08-25
Attending: CLINIC/CENTER
Payer: MEDICAID

## 2021-08-25 VITALS
HEIGHT: 63 IN | SYSTOLIC BLOOD PRESSURE: 143 MMHG | WEIGHT: 195.11 LBS | HEART RATE: 97 BPM | RESPIRATION RATE: 20 BRPM | TEMPERATURE: 98 F | DIASTOLIC BLOOD PRESSURE: 89 MMHG | OXYGEN SATURATION: 99 %

## 2021-08-25 VITALS
DIASTOLIC BLOOD PRESSURE: 75 MMHG | HEART RATE: 80 BPM | SYSTOLIC BLOOD PRESSURE: 128 MMHG | RESPIRATION RATE: 19 BRPM | OXYGEN SATURATION: 100 %

## 2021-08-25 DIAGNOSIS — Z90.722 ACQUIRED ABSENCE OF OVARIES, BILATERAL: Chronic | ICD-10-CM

## 2021-08-25 DIAGNOSIS — Z90.10 ACQUIRED ABSENCE OF UNSPECIFIED BREAST AND NIPPLE: Chronic | ICD-10-CM

## 2021-08-25 LAB
ALBUMIN SERPL ELPH-MCNC: 4.3 G/DL — SIGNIFICANT CHANGE UP (ref 3.3–5)
ALP SERPL-CCNC: 91 U/L — SIGNIFICANT CHANGE UP (ref 40–120)
ALT FLD-CCNC: 21 U/L — SIGNIFICANT CHANGE UP (ref 10–45)
ANION GAP SERPL CALC-SCNC: 14 MMOL/L — SIGNIFICANT CHANGE UP (ref 5–17)
APTT BLD: 35 SEC — SIGNIFICANT CHANGE UP (ref 27.5–35.5)
AST SERPL-CCNC: 19 U/L — SIGNIFICANT CHANGE UP (ref 10–40)
BASOPHILS # BLD AUTO: 0.03 K/UL — SIGNIFICANT CHANGE UP (ref 0–0.2)
BASOPHILS NFR BLD AUTO: 0.4 % — SIGNIFICANT CHANGE UP (ref 0–2)
BILIRUB SERPL-MCNC: 0.6 MG/DL — SIGNIFICANT CHANGE UP (ref 0.2–1.2)
BUN SERPL-MCNC: 16 MG/DL — SIGNIFICANT CHANGE UP (ref 7–23)
CALCIUM SERPL-MCNC: 10.2 MG/DL — SIGNIFICANT CHANGE UP (ref 8.4–10.5)
CHLORIDE SERPL-SCNC: 102 MMOL/L — SIGNIFICANT CHANGE UP (ref 96–108)
CO2 SERPL-SCNC: 23 MMOL/L — SIGNIFICANT CHANGE UP (ref 22–31)
CREAT SERPL-MCNC: 0.69 MG/DL — SIGNIFICANT CHANGE UP (ref 0.5–1.3)
EOSINOPHIL # BLD AUTO: 0.08 K/UL — SIGNIFICANT CHANGE UP (ref 0–0.5)
EOSINOPHIL NFR BLD AUTO: 1.1 % — SIGNIFICANT CHANGE UP (ref 0–6)
GLUCOSE SERPL-MCNC: 92 MG/DL — SIGNIFICANT CHANGE UP (ref 70–99)
HCT VFR BLD CALC: 36 % — SIGNIFICANT CHANGE UP (ref 34.5–45)
HGB BLD-MCNC: 11.5 G/DL — SIGNIFICANT CHANGE UP (ref 11.5–15.5)
IMM GRANULOCYTES NFR BLD AUTO: 0.4 % — SIGNIFICANT CHANGE UP (ref 0–1.5)
INR BLD: 1.19 RATIO — HIGH (ref 0.88–1.16)
LYMPHOCYTES # BLD AUTO: 1.85 K/UL — SIGNIFICANT CHANGE UP (ref 1–3.3)
LYMPHOCYTES # BLD AUTO: 24.6 % — SIGNIFICANT CHANGE UP (ref 13–44)
MCHC RBC-ENTMCNC: 26.7 PG — LOW (ref 27–34)
MCHC RBC-ENTMCNC: 31.9 GM/DL — LOW (ref 32–36)
MCV RBC AUTO: 83.5 FL — SIGNIFICANT CHANGE UP (ref 80–100)
MONOCYTES # BLD AUTO: 0.51 K/UL — SIGNIFICANT CHANGE UP (ref 0–0.9)
MONOCYTES NFR BLD AUTO: 6.8 % — SIGNIFICANT CHANGE UP (ref 2–14)
NEUTROPHILS # BLD AUTO: 5.01 K/UL — SIGNIFICANT CHANGE UP (ref 1.8–7.4)
NEUTROPHILS NFR BLD AUTO: 66.7 % — SIGNIFICANT CHANGE UP (ref 43–77)
NRBC # BLD: 0 /100 WBCS — SIGNIFICANT CHANGE UP (ref 0–0)
PLATELET # BLD AUTO: 315 K/UL — SIGNIFICANT CHANGE UP (ref 150–400)
POTASSIUM SERPL-MCNC: 3.9 MMOL/L — SIGNIFICANT CHANGE UP (ref 3.5–5.3)
POTASSIUM SERPL-SCNC: 3.9 MMOL/L — SIGNIFICANT CHANGE UP (ref 3.5–5.3)
PROT SERPL-MCNC: 7.3 G/DL — SIGNIFICANT CHANGE UP (ref 6–8.3)
PROTHROM AB SERPL-ACNC: 14.2 SEC — HIGH (ref 10.6–13.6)
RBC # BLD: 4.31 M/UL — SIGNIFICANT CHANGE UP (ref 3.8–5.2)
RBC # FLD: 13.3 % — SIGNIFICANT CHANGE UP (ref 10.3–14.5)
SODIUM SERPL-SCNC: 139 MMOL/L — SIGNIFICANT CHANGE UP (ref 135–145)
WBC # BLD: 7.51 K/UL — SIGNIFICANT CHANGE UP (ref 3.8–10.5)
WBC # FLD AUTO: 7.51 K/UL — SIGNIFICANT CHANGE UP (ref 3.8–10.5)

## 2021-08-25 PROCEDURE — 93971 EXTREMITY STUDY: CPT | Mod: 26,LT

## 2021-08-25 PROCEDURE — 84484 ASSAY OF TROPONIN QUANT: CPT

## 2021-08-25 PROCEDURE — 99285 EMERGENCY DEPT VISIT HI MDM: CPT

## 2021-08-25 PROCEDURE — 93971 EXTREMITY STUDY: CPT

## 2021-08-25 PROCEDURE — 71045 X-RAY EXAM CHEST 1 VIEW: CPT

## 2021-08-25 PROCEDURE — 85610 PROTHROMBIN TIME: CPT

## 2021-08-25 PROCEDURE — 71275 CT ANGIOGRAPHY CHEST: CPT | Mod: 26,MA

## 2021-08-25 PROCEDURE — 93005 ELECTROCARDIOGRAM TRACING: CPT

## 2021-08-25 PROCEDURE — 99284 EMERGENCY DEPT VISIT MOD MDM: CPT | Mod: 25

## 2021-08-25 PROCEDURE — 80053 COMPREHEN METABOLIC PANEL: CPT

## 2021-08-25 PROCEDURE — 85025 COMPLETE CBC W/AUTO DIFF WBC: CPT

## 2021-08-25 PROCEDURE — 71275 CT ANGIOGRAPHY CHEST: CPT | Mod: MA

## 2021-08-25 PROCEDURE — 71045 X-RAY EXAM CHEST 1 VIEW: CPT | Mod: 26

## 2021-08-25 PROCEDURE — 85730 THROMBOPLASTIN TIME PARTIAL: CPT

## 2021-08-25 NOTE — ED PROVIDER NOTE - ATTENDING CONTRIBUTION TO CARE
Agree with above except noted:     h.o breast ca, p.w shortness of breath and focal tingling/throbbing of her popiteal area for 2 days. nontoxic appearing, NAD, non hypoxic, popiteal area appears normal, possible msk spasm due to small focality and no skin changes. unlikely PE but has h.o cancer. will get cta and dvt eval w. acs due to age and comorbidity. no sign of infection or fevers. will get comprehensive labs to evaluate for hematologic abnormality, renal function, electrolyte derangement. dispo pending labs/imaging/further workups.

## 2021-08-25 NOTE — ED PROVIDER NOTE - NSFOLLOWUPINSTRUCTIONS_ED_ALL_ED_FT
Continue your current medication regimen.  Follow up with your Primary Care Provider upon discharge.   Bring a copy of your test results (attached along with your discharge paperwork) with you to your appointment for further discussion, evaluation, and comparison with your prior results.  Please return to the Emergency Department immediately for any new, worsening, or concerning symptoms including worsening shortness of breath, chest pain, passing out, or any other concerns.

## 2021-08-25 NOTE — ED PROVIDER NOTE - MUSCULOSKELETAL, MLM
Spine appears normal, range of motion is not limited, no muscle or joint tenderness. No unequal LE swelling, no significant calf ttp, no redness.

## 2021-08-25 NOTE — ED PROVIDER NOTE - OBJECTIVE STATEMENT
52yo F with PMH breast CA on oral meds in remission x 9yrs, psoriasis and psoriatic arthritis, presenting with throbbing, but not painul, left lower leg varicose vein x 2 days and SOB x 1 day. Pt reports she noticed throbbing of her left leg vein yesterday and spoke with her PCP who referred her for DVT study today however pt did not go. Reports since waking up today she has had intermittent, random, episodes of SOB and "not feeling right." Has never felt like this before. No history of DVT/PEs. Denies any fever/chills, cough, recent illness, CP, abd pain, n/v, heavy menses, melena, BRBPR, hematuria, recent travel, OCP use.

## 2021-08-25 NOTE — ED PROVIDER NOTE - CONSTITUTIONAL, MLM
normal... Well appearing, awake, alert, oriented to person, place, time/situation and in no apparent distress. Family

## 2021-08-25 NOTE — ED PROVIDER NOTE - PATIENT PORTAL LINK FT
You can access the FollowMyHealth Patient Portal offered by [facility] by registering at the following website: [email or web address]. By joining [employer]’s FollowMyHealth portal, you will also be able to view your health information using other applications (apps) compatible with our system.

## 2021-08-25 NOTE — ED ADULT NURSE REASSESSMENT NOTE - NS ED NURSE REASSESS COMMENT FT1
Report received from MERLIN Oliveros. Pt A+Ox3, c/o SOB, VSS, lab results unremarkable. Pt cleared for d/c home for follow up with PCP.

## 2021-08-26 ENCOUNTER — TRANSCRIPTION ENCOUNTER (OUTPATIENT)
Age: 51
End: 2021-08-26

## 2021-08-26 LAB
25(OH)D3 SERPL-MCNC: 34.1 NG/ML
CK SERPL-CCNC: 36 U/L
FOLATE SERPL-MCNC: 14.7 NG/ML
VIT B12 SERPL-MCNC: 370 PG/ML

## 2021-08-27 ENCOUNTER — TRANSCRIPTION ENCOUNTER (OUTPATIENT)
Age: 51
End: 2021-08-27

## 2021-08-30 ENCOUNTER — APPOINTMENT (OUTPATIENT)
Dept: RHEUMATOLOGY | Facility: CLINIC | Age: 51
End: 2021-08-30
Payer: MEDICAID

## 2021-08-30 VITALS
BODY MASS INDEX: 33.29 KG/M2 | OXYGEN SATURATION: 97 % | SYSTOLIC BLOOD PRESSURE: 110 MMHG | WEIGHT: 195 LBS | TEMPERATURE: 97.7 F | HEIGHT: 64 IN | RESPIRATION RATE: 16 BRPM | HEART RATE: 90 BPM | DIASTOLIC BLOOD PRESSURE: 69 MMHG

## 2021-08-30 PROCEDURE — 96401 CHEMO ANTI-NEOPL SQ/IM: CPT

## 2021-08-30 RX ORDER — USTEKINUMAB 45 MG/.5ML
45 INJECTION, SOLUTION SUBCUTANEOUS
Refills: 0 | Status: COMPLETED | OUTPATIENT
Start: 2021-08-30

## 2021-08-30 RX ADMIN — USTEKINUMAB 0 MG/0.5ML: 45 INJECTION, SOLUTION SUBCUTANEOUS at 00:00

## 2021-09-02 ENCOUNTER — TRANSCRIPTION ENCOUNTER (OUTPATIENT)
Age: 51
End: 2021-09-02

## 2021-09-02 RX ORDER — ESCITALOPRAM OXALATE 10 MG/1
10 TABLET ORAL DAILY
Qty: 30 | Refills: 1 | Status: DISCONTINUED | COMMUNITY
Start: 2021-08-27 | End: 2021-09-02

## 2021-09-07 ENCOUNTER — TRANSCRIPTION ENCOUNTER (OUTPATIENT)
Age: 51
End: 2021-09-07

## 2021-09-10 ENCOUNTER — APPOINTMENT (OUTPATIENT)
Dept: INTERNAL MEDICINE | Facility: CLINIC | Age: 51
End: 2021-09-10
Payer: MEDICAID

## 2021-09-10 VITALS
BODY MASS INDEX: 33.29 KG/M2 | OXYGEN SATURATION: 99 % | WEIGHT: 195 LBS | HEIGHT: 64 IN | HEART RATE: 87 BPM | TEMPERATURE: 97.8 F

## 2021-09-10 PROCEDURE — 99214 OFFICE O/P EST MOD 30 MIN: CPT

## 2021-09-10 RX ORDER — MELOXICAM 15 MG/1
15 TABLET ORAL
Qty: 30 | Refills: 1 | Status: DISCONTINUED | COMMUNITY
Start: 2021-07-06 | End: 2021-09-10

## 2021-09-10 RX ORDER — APREMILAST 30 MG/1
30 TABLET, FILM COATED ORAL
Qty: 60 | Refills: 0 | Status: DISCONTINUED | COMMUNITY
Start: 2020-09-09 | End: 2021-09-10

## 2021-09-10 RX ORDER — GABAPENTIN 100 MG/1
100 CAPSULE ORAL 3 TIMES DAILY
Qty: 90 | Refills: 0 | Status: DISCONTINUED | COMMUNITY
Start: 2021-06-01 | End: 2021-09-10

## 2021-09-10 RX ORDER — APREMILAST 30 MG/1
30 TABLET, FILM COATED ORAL
Qty: 1 | Refills: 3 | Status: DISCONTINUED | COMMUNITY
Start: 2021-01-12 | End: 2021-09-10

## 2021-09-10 RX ORDER — INDOMETHACIN 75 MG/1
75 CAPSULE, EXTENDED RELEASE ORAL
Qty: 60 | Refills: 2 | Status: DISCONTINUED | COMMUNITY
Start: 2021-07-13 | End: 2021-09-10

## 2021-09-10 RX ORDER — METHOCARBAMOL 500 MG/1
500 TABLET, FILM COATED ORAL
Qty: 30 | Refills: 1 | Status: DISCONTINUED | COMMUNITY
Start: 2021-05-04 | End: 2021-09-10

## 2021-09-10 RX ORDER — IBUPROFEN 800 MG/1
800 TABLET, FILM COATED ORAL
Qty: 30 | Refills: 0 | Status: DISCONTINUED | COMMUNITY
Start: 2020-09-19 | End: 2021-09-10

## 2021-09-10 NOTE — HISTORY OF PRESENT ILLNESS
[de-identified] :  - back spasm- different locations of the back- had 3 episodes- but stopped 5 mo ago- but still has sensation of muscle pulling.  pt was seen by RHeum, Spine.  reviewed Rheum 7/27/21 notes- started on Stelara. pt doesn't want to try cymbalta now.\par reviewed Spine 7/13/21.  reviewed MRI T/ L spine- spondylosis, atypical hemangioma.  upper back and lower thoracic area- flank are involved\par reviewed CT abd 5/11/21- sacral lmets, L axillary LN- known conditions at [facility] as per pt\par reviewed Pain Management- trial gabapentin- help but it knocks her out.  no diclofenac- daily for sev mo\par taking tizanidine- helps hers.  pt states didn't want to try pred b/c made pain worse.  tried PT for a few wk but she couldn't tolerate due it trigger sciatic pain\par psoriatic arthritis- seeing Rheum \par had colonoscopy 2019- nl as per pt\par last mammo - 2021. last PAP 10/20- nl\par better when not standing.  ok w walking\par  reviewed ER rec 8/25/21- for dyspnea. reviewed 8/25/21 CT angio- neg for PE.  reviewed labs, CXR 8/2521.  dyspnea now resolved\par \par \par breast Ca- dx 2000- had mastectomy R breast, s/p chemo.  Mets to back- 2009.  pt being f/u [facility]- last visit 6 wk . due for PET scan- was ordered [FreeTextEntry1] : back pain

## 2021-09-10 NOTE — PLAN
[FreeTextEntry1] : refused vaccines \par back spasm- pt to f/u w Rheum.  discussed cymbalta w pt- reviewed SE- benefits w pain and mood were discussed. pt will speak to Rheum .  pt hasn't started med yet\par CPE next visit

## 2021-09-13 ENCOUNTER — TRANSCRIPTION ENCOUNTER (OUTPATIENT)
Age: 51
End: 2021-09-13

## 2021-09-17 ENCOUNTER — TRANSCRIPTION ENCOUNTER (OUTPATIENT)
Age: 51
End: 2021-09-17

## 2021-09-17 ENCOUNTER — APPOINTMENT (OUTPATIENT)
Dept: NEUROLOGY | Facility: CLINIC | Age: 51
End: 2021-09-17

## 2021-09-17 ENCOUNTER — NON-APPOINTMENT (OUTPATIENT)
Age: 51
End: 2021-09-17

## 2021-09-20 ENCOUNTER — TRANSCRIPTION ENCOUNTER (OUTPATIENT)
Age: 51
End: 2021-09-20

## 2021-09-21 ENCOUNTER — TRANSCRIPTION ENCOUNTER (OUTPATIENT)
Age: 51
End: 2021-09-21

## 2021-09-22 ENCOUNTER — NON-APPOINTMENT (OUTPATIENT)
Age: 51
End: 2021-09-22

## 2021-09-24 ENCOUNTER — TRANSCRIPTION ENCOUNTER (OUTPATIENT)
Age: 51
End: 2021-09-24

## 2021-10-15 ENCOUNTER — TRANSCRIPTION ENCOUNTER (OUTPATIENT)
Age: 51
End: 2021-10-15

## 2021-10-20 ENCOUNTER — TRANSCRIPTION ENCOUNTER (OUTPATIENT)
Age: 51
End: 2021-10-20

## 2021-10-21 ENCOUNTER — TRANSCRIPTION ENCOUNTER (OUTPATIENT)
Age: 51
End: 2021-10-21

## 2021-10-22 ENCOUNTER — TRANSCRIPTION ENCOUNTER (OUTPATIENT)
Age: 51
End: 2021-10-22

## 2021-10-25 ENCOUNTER — TRANSCRIPTION ENCOUNTER (OUTPATIENT)
Age: 51
End: 2021-10-25

## 2021-10-26 ENCOUNTER — TRANSCRIPTION ENCOUNTER (OUTPATIENT)
Age: 51
End: 2021-10-26

## 2021-10-27 RX ORDER — METHYLPREDNISOLONE 4 MG/1
4 TABLET ORAL
Qty: 1 | Refills: 0 | Status: DISCONTINUED | COMMUNITY
Start: 2021-09-13 | End: 2021-10-27

## 2021-10-28 ENCOUNTER — TRANSCRIPTION ENCOUNTER (OUTPATIENT)
Age: 51
End: 2021-10-28

## 2021-10-29 ENCOUNTER — TRANSCRIPTION ENCOUNTER (OUTPATIENT)
Age: 51
End: 2021-10-29

## 2021-10-29 RX ORDER — USTEKINUMAB 45 MG/.5ML
45 INJECTION, SOLUTION SUBCUTANEOUS
Qty: 2 | Refills: 2 | Status: DISCONTINUED | COMMUNITY
Start: 2021-07-12 | End: 2021-10-29

## 2021-11-01 ENCOUNTER — TRANSCRIPTION ENCOUNTER (OUTPATIENT)
Age: 51
End: 2021-11-01

## 2021-11-02 ENCOUNTER — TRANSCRIPTION ENCOUNTER (OUTPATIENT)
Age: 51
End: 2021-11-02

## 2021-11-03 ENCOUNTER — TRANSCRIPTION ENCOUNTER (OUTPATIENT)
Age: 51
End: 2021-11-03

## 2021-11-10 ENCOUNTER — TRANSCRIPTION ENCOUNTER (OUTPATIENT)
Age: 51
End: 2021-11-10

## 2021-11-11 ENCOUNTER — TRANSCRIPTION ENCOUNTER (OUTPATIENT)
Age: 51
End: 2021-11-11

## 2021-11-12 ENCOUNTER — APPOINTMENT (OUTPATIENT)
Dept: PEDIATRIC ALLERGY IMMUNOLOGY | Facility: CLINIC | Age: 51
End: 2021-11-12
Payer: MEDICAID

## 2021-11-12 VITALS — BODY MASS INDEX: 35.57 KG/M2 | HEART RATE: 87 BPM | WEIGHT: 207.19 LBS

## 2021-11-12 DIAGNOSIS — J30.9 ALLERGIC RHINITIS, UNSPECIFIED: ICD-10-CM

## 2021-11-12 DIAGNOSIS — Z91.040 LATEX ALLERGY STATUS: ICD-10-CM

## 2021-11-12 PROCEDURE — 99204 OFFICE O/P NEW MOD 45 MIN: CPT | Mod: 25

## 2021-11-12 PROCEDURE — 95004 PERQ TESTS W/ALRGNC XTRCS: CPT

## 2021-11-12 PROCEDURE — 99244 OFF/OP CNSLTJ NEW/EST MOD 40: CPT | Mod: 25

## 2021-11-15 ENCOUNTER — LABORATORY RESULT (OUTPATIENT)
Age: 51
End: 2021-11-15

## 2021-11-18 PROBLEM — J30.9 ALLERGIC RHINITIS: Status: ACTIVE | Noted: 2021-11-18

## 2021-11-18 NOTE — HISTORY OF PRESENT ILLNESS
[de-identified] : 51-year-old female with psoriatic arthritis and history of breast cancer s/p mastectomy, chemotherapy and Tamoxifen presenting for evaluation of latex allergy.\par \par Latex allergy: After her breast cancer diagnosis ~2000, underwent mastectomy. During the procedure developed a rash all over her body. Appeared red, raised. Looked like "burnt skin". Lasted for weeks. After this reaction she was labeled with latex allergy, although she is not sure why latex was thought to be the culprit. Was evaluated with a patch testing years ago at [facility], results were negative (these results could not be retrieved). Thinks she has been around latex balloons and have touched them without adverse reactions. \par Planned to start Cosentyx as part of psoriatic arthritis treatment, which has latex in it. Would like to know if she can receive this medications. \par Of note, penicillin allergy was documented in patient's chart. But patient does not recall any adverse reactions to penicillin. Reports having used penicillin about 4 times in the past 10-15 years, all well tolerated.\par \par COVID vaccine questions: Had COVID infection in Jan 2021, didn't require hospitalization. In April 2021 received Herrera&Herrera vaccine. About 2 weeks after developed muscle spasm of her back, recurred a few times. Has severe back, shoulder pain. Has been treated with NSAIDs, muscle relaxants, intraarticular steroid injections. Currently on Cymbalta. Had carried heavy boxes right before. But also suspects the vaccine as a potential trigger and inquiring if she should receive a booster. \par  \par Psoriatic arthritis: Managed by Dr Stephanie Allen. Has it since childhood. Worse in the past 15 years or so. Treated with Otezla, then Stelara and phototherapy. Stopped phototherapy due to difficulty with ambulating with after her back pain and muscle spasm. Planned to start Cosentyx.\par \par Allergic rhinitis: Reports nasal symptoms with cat exposure and suspects pollen allergy as well. Would like to defer environmental allergy skin testing at this time. \par

## 2021-11-18 NOTE — PHYSICAL EXAM
[Alert] : alert [Well Nourished] : well nourished [Healthy Appearance] : healthy appearance [No Acute Distress] : no acute distress [Well Developed] : well developed [No Discharge] : no discharge [Conjunctival Erythema] : no conjunctival erythema [Suborbital Bogginess] : no suborbital bogginess (allergic shiners) [Normal Nasal Mucosa] : the nasal mucosa was normal [Normal Lips/Tongue] : the lips and tongue were normal [Normal Outer Ear/Nose] : the ears and nose were normal in appearance [No Nasal Discharge] : no nasal discharge [Normal Tonsils] : normal tonsils [No Thrush] : no thrush [Pale mucosa] : no pale mucosa [Boggy Nasal Turbinates] : no boggy and/or pale nasal turbinates [Posterior Pharyngeal Cobblestoning] : no posterior pharyngeal cobblestoning [Supple] : the neck was supple [Normal Rate and Effort] : normal respiratory rhythm and effort [No Crackles] : no crackles [No Retractions] : no retractions [Bilateral Audible Breath Sounds] : bilateral audible breath sounds [Wheezing] : no wheezing was heard [Normal Rate] : heart rate was normal  [Normal S1, S2] : normal S1 and S2 [No murmur] : no murmur [Regular Rhythm] : with a regular rhythm [Not Distended] : not distended [Normal Cervical Lymph Nodes] : cervical [Skin Intact] : skin intact  [No Rash] : no rash [No Cyanosis] : no cyanosis [Normal Mood] : mood was normal [Normal Affect] : affect was normal [Alert, Awake, Oriented as Age-Appropriate] : alert, awake, oriented as age appropriate

## 2021-11-18 NOTE — CONSULT LETTER
[Dear  ___] : Dear  [unfilled], [Consult Letter:] : I had the pleasure of evaluating your patient, [unfilled]. [Please see my note below.] : Please see my note below. [Consult Closing:] : Thank you very much for allowing me to participate in the care of this patient.  If you have any questions, please do not hesitate to contact me. [Sincerely,] : Sincerely, [FreeTextEntry3] : Tianna Miller MD\par Attending, Division of Allergy and Immunology\par [facility]

## 2021-11-19 ENCOUNTER — TRANSCRIPTION ENCOUNTER (OUTPATIENT)
Age: 51
End: 2021-11-19

## 2021-11-19 LAB
DEPRECATED LTX IGE RAST QL: 0
LTX IGE QN: <0.1 KUA/L

## 2021-11-22 ENCOUNTER — TRANSCRIPTION ENCOUNTER (OUTPATIENT)
Age: 51
End: 2021-11-22

## 2021-11-23 ENCOUNTER — TRANSCRIPTION ENCOUNTER (OUTPATIENT)
Age: 51
End: 2021-11-23

## 2021-11-24 ENCOUNTER — NON-APPOINTMENT (OUTPATIENT)
Age: 51
End: 2021-11-24

## 2021-11-24 ENCOUNTER — LABORATORY RESULT (OUTPATIENT)
Age: 51
End: 2021-11-24

## 2021-11-24 ENCOUNTER — APPOINTMENT (OUTPATIENT)
Dept: INTERNAL MEDICINE | Facility: CLINIC | Age: 51
End: 2021-11-24
Payer: MEDICAID

## 2021-11-24 VITALS
WEIGHT: 204 LBS | BODY MASS INDEX: 34.83 KG/M2 | SYSTOLIC BLOOD PRESSURE: 120 MMHG | HEART RATE: 113 BPM | TEMPERATURE: 98.4 F | DIASTOLIC BLOOD PRESSURE: 80 MMHG | HEIGHT: 64 IN | OXYGEN SATURATION: 98 %

## 2021-11-24 DIAGNOSIS — Z01.84 ENCOUNTER FOR ANTIBODY RESPONSE EXAMINATION: ICD-10-CM

## 2021-11-24 DIAGNOSIS — M62.830 MUSCLE SPASM OF BACK: ICD-10-CM

## 2021-11-24 DIAGNOSIS — R31.29 OTHER MICROSCOPIC HEMATURIA: ICD-10-CM

## 2021-11-24 DIAGNOSIS — T50.905A ADVERSE EFFECT OF UNSPECIFIED DRUGS, MEDICAMENTS AND BIOLOGICAL SUBSTANCES, INITIAL ENCOUNTER: ICD-10-CM

## 2021-11-24 DIAGNOSIS — Z11.59 ENCOUNTER FOR SCREENING FOR OTHER VIRAL DISEASES: ICD-10-CM

## 2021-11-24 DIAGNOSIS — Z87.39 PERSONAL HISTORY OF OTHER DISEASES OF THE MUSCULOSKELETAL SYSTEM AND CONNECTIVE TISSUE: ICD-10-CM

## 2021-11-24 DIAGNOSIS — Z23 ENCOUNTER FOR IMMUNIZATION: ICD-10-CM

## 2021-11-24 DIAGNOSIS — K76.89 OTHER SPECIFIED DISEASES OF LIVER: ICD-10-CM

## 2021-11-24 DIAGNOSIS — Z79.899 OTHER LONG TERM (CURRENT) DRUG THERAPY: ICD-10-CM

## 2021-11-24 DIAGNOSIS — Z01.818 ENCOUNTER FOR OTHER PREPROCEDURAL EXAMINATION: ICD-10-CM

## 2021-11-24 DIAGNOSIS — Z42.1 ENCOUNTER FOR BREAST RECONSTRUCTION FOLLOWING MASTECTOMY: ICD-10-CM

## 2021-11-24 DIAGNOSIS — K59.00 CONSTIPATION, UNSPECIFIED: ICD-10-CM

## 2021-11-24 DIAGNOSIS — N65.0 DEFORMITY OF RECONSTRUCTED BREAST: ICD-10-CM

## 2021-11-24 DIAGNOSIS — N65.1 DISPROPORTION OF RECONSTRUCTED BREAST: ICD-10-CM

## 2021-11-24 DIAGNOSIS — Z56.0 UNEMPLOYMENT, UNSPECIFIED: ICD-10-CM

## 2021-11-24 PROCEDURE — 99396 PREV VISIT EST AGE 40-64: CPT | Mod: 25

## 2021-11-24 PROCEDURE — 93000 ELECTROCARDIOGRAM COMPLETE: CPT

## 2021-11-24 RX ORDER — TIZANIDINE 2 MG/1
2 TABLET ORAL
Qty: 60 | Refills: 0 | Status: DISCONTINUED | COMMUNITY
Start: 2021-05-21 | End: 2021-11-24

## 2021-11-24 RX ORDER — DULOXETINE HYDROCHLORIDE 30 MG/1
30 CAPSULE, DELAYED RELEASE PELLETS ORAL
Qty: 7 | Refills: 0 | Status: DISCONTINUED | COMMUNITY
Start: 2021-09-07 | End: 2021-11-24

## 2021-11-24 RX ORDER — CHLORHEXIDINE GLUCONATE, 0.12% ORAL RINSE 1.2 MG/ML
0.12 SOLUTION DENTAL
Qty: 473 | Refills: 0 | Status: DISCONTINUED | COMMUNITY
Start: 2020-08-06 | End: 2021-11-24

## 2021-11-24 SDOH — ECONOMIC STABILITY - INCOME SECURITY: UNEMPLOYMENT, UNSPECIFIED: Z56.0

## 2021-11-24 NOTE — PLAN
[FreeTextEntry1] : EKG- NSR 94\par constipation is SE of cymbalta- pt to eat prunes, oatmeal, incr fiber, miralax

## 2021-11-24 NOTE — HEALTH RISK ASSESSMENT
[Good] : ~his/her~  mood as  good [No] : In the past 12 months have you used drugs other than those required for medical reasons? No [No falls in past year] : Patient reported no falls in the past year [0] : 2) Feeling down, depressed, or hopeless: Not at all (0) [Patient reported PAP Smear was normal] : Patient reported PAP Smear was normal [Patient reported colonoscopy was normal] : Patient reported colonoscopy was normal [HIV test declined] : HIV test declined [Hepatitis C test declined] : Hepatitis C test declined [None] : None [With Family] : lives with family [Unemployed] : unemployed [Single] : single [# Of Children ___] : has [unfilled] children [Feels Safe at Home] : Feels safe at home [Fully functional (bathing, dressing, toileting, transferring, walking, feeding)] : Fully functional (bathing, dressing, toileting, transferring, walking, feeding) [Fully functional (using the telephone, shopping, preparing meals, housekeeping, doing laundry, using] : Fully functional and needs no help or supervision to perform IADLs (using the telephone, shopping, preparing meals, housekeeping, doing laundry, using transportation, managing medications and managing finances) [Seat Belt] :  uses seat belt [Never (0 pts)] : Never (0 points) [PHQ-2 Negative - No further assessment needed] : PHQ-2 Negative - No further assessment needed [Patient reported mammogram was normal] : Patient reported mammogram was normal [With Patient/Caregiver] : , with patient/caregiver [Designated Healthcare Proxy] : Designated healthcare proxy [Relationship: ___] : Relationship: [unfilled] [] : No [[id number]] : 0 [Change in mental status noted] : No change in mental status noted [Language] : denies difficulty with language [Sexually Active] : not sexually active [High Risk Behavior] : no high risk behavior [Reports changes in hearing] : Reports no changes in hearing [Reports changes in vision] : Reports no changes in vision [Reports changes in dental health] : Reports no changes in dental health [MammogramDate] : 10/21 [MammogramComments] : as per pt .  pt has appt for it [PapSmearDate] : 11/21 [PapSmearComments] : pt has appt w GYN [ColonoscopyDate] : 1/2019 [ColonoscopyComments] : as per pt [de-identified] : last seen ophth 1 yr [de-identified] : Seen dentist early this yr11/21 [AdvancecareDate] : 11/21

## 2021-11-24 NOTE — HISTORY OF PRESENT ILLNESS
[FreeTextEntry1] : CPE [de-identified] : vaccine- pt thinks her inflammatory arthritis is from COVID vac- J+ J. pt is afraid to get flu vac\par diet- mainly plant based\par exercise- limited\par thyroid nodule - US 9/10/21\par chg in bm- mild constipation- since taking cymbalta\par incr flatulence- started since taking cymbalta\par achy jts- has psoriatic arthritis- jt pain better w cymbalta- will stop stellara and start Consentyx\par hx of chronic back pain- 50% better since taking cymbalta. seen spine, pain manage ment\par hx of breast CA- being f/[facility]- [place] abd 5/11/21 sacral mets.  MRI - atypical hemangioma, spondylosis.  PET scan- as per pt, it showed inflammation.  had breast exam at [facility] a few mo ago

## 2021-11-24 NOTE — PHYSICAL EXAM
[No Acute Distress] : no acute distress [Well Nourished] : well nourished [Well Developed] : well developed [Well-Appearing] : well-appearing [Normal Sclera/Conjunctiva] : normal sclera/conjunctiva [PERRL] : pupils equal round and reactive to light [Normal Outer Ear/Nose] : the outer ears and nose were normal in appearance [Normal Oropharynx] : the oropharynx was normal [Normal TMs] : both tympanic membranes were normal [No Lymphadenopathy] : no lymphadenopathy [Supple] : supple [No Respiratory Distress] : no respiratory distress  [No Accessory Muscle Use] : no accessory muscle use [Clear to Auscultation] : lungs were clear to auscultation bilaterally [Normal Rate] : normal rate  [Regular Rhythm] : with a regular rhythm [Normal S1, S2] : normal S1 and S2 [No Murmur] : no murmur heard [No Carotid Bruits] : no carotid bruits [No Edema] : there was no peripheral edema [Soft] : abdomen soft [Non Tender] : non-tender [Non-distended] : non-distended [No Masses] : no abdominal mass palpated [Normal Bowel Sounds] : normal bowel sounds [Normal Supraclavicular Nodes] : no supraclavicular lymphadenopathy [Normal Axillary Nodes] : no axillary lymphadenopathy [Normal Posterior Cervical Nodes] : no posterior cervical lymphadenopathy [Normal Anterior Cervical Nodes] : no anterior cervical lymphadenopathy [Normal Inguinal Nodes] : no inguinal lymphadenopathy [Grossly Normal Strength/Tone] : grossly normal strength/tone [No Focal Deficits] : no focal deficits [Normal Gait] : normal gait [Normal Affect] : the affect was normal [Normal Insight/Judgement] : insight and judgment were intact [de-identified] : scaly rash on ears, abd, chest

## 2021-11-28 ENCOUNTER — NON-APPOINTMENT (OUTPATIENT)
Age: 51
End: 2021-11-28

## 2021-11-29 ENCOUNTER — TRANSCRIPTION ENCOUNTER (OUTPATIENT)
Age: 51
End: 2021-11-29

## 2021-11-30 ENCOUNTER — NON-APPOINTMENT (OUTPATIENT)
Age: 51
End: 2021-11-30

## 2021-11-30 ENCOUNTER — APPOINTMENT (OUTPATIENT)
Dept: RHEUMATOLOGY | Facility: CLINIC | Age: 51
End: 2021-11-30
Payer: MEDICAID

## 2021-11-30 VITALS
RESPIRATION RATE: 16 BRPM | TEMPERATURE: 98 F | SYSTOLIC BLOOD PRESSURE: 126 MMHG | HEIGHT: 64 IN | BODY MASS INDEX: 34.83 KG/M2 | HEART RATE: 122 BPM | OXYGEN SATURATION: 98 % | DIASTOLIC BLOOD PRESSURE: 82 MMHG | WEIGHT: 204 LBS

## 2021-11-30 LAB
25(OH)D3 SERPL-MCNC: 32.5 NG/ML
ALBUMIN SERPL ELPH-MCNC: 4.6 G/DL
ALP BLD-CCNC: 93 U/L
ALT SERPL-CCNC: 36 U/L
ANION GAP SERPL CALC-SCNC: 13 MMOL/L
AST SERPL-CCNC: 41 U/L
BILIRUB SERPL-MCNC: 0.7 MG/DL
BUN SERPL-MCNC: 15 MG/DL
CALCIUM SERPL-MCNC: 10 MG/DL
CHLORIDE SERPL-SCNC: 103 MMOL/L
CHOLEST SERPL-MCNC: 176 MG/DL
CO2 SERPL-SCNC: 24 MMOL/L
CREAT SERPL-MCNC: 0.62 MG/DL
ESTIMATED AVERAGE GLUCOSE: 97 MG/DL
FERRITIN SERPL-MCNC: 105 NG/ML
FOLATE SERPL-MCNC: 13.7 NG/ML
GLUCOSE SERPL-MCNC: 82 MG/DL
HBA1C MFR BLD HPLC: 5 %
HCV AB SER QL: NONREACTIVE
HCV S/CO RATIO: 0.07 S/CO
HDLC SERPL-MCNC: 38 MG/DL
IRON SATN MFR SERPL: 9 %
IRON SERPL-MCNC: 28 UG/DL
LDLC SERPL CALC-MCNC: 117 MG/DL
LDLC SERPL DIRECT ASSAY-MCNC: 109 MG/DL
MAGNESIUM SERPL-MCNC: 1.9 MG/DL
NONHDLC SERPL-MCNC: 138 MG/DL
POTASSIUM SERPL-SCNC: 4.2 MMOL/L
PROT SERPL-MCNC: 7.1 G/DL
SODIUM SERPL-SCNC: 141 MMOL/L
T3 SERPL-MCNC: 108 NG/DL
T4 FREE SERPL-MCNC: 1.3 NG/DL
TIBC SERPL-MCNC: 300 UG/DL
TRIGL SERPL-MCNC: 108 MG/DL
TSH SERPL-ACNC: 1.29 UIU/ML
UIBC SERPL-MCNC: 272 UG/DL
VIT B12 SERPL-MCNC: 399 PG/ML

## 2021-11-30 PROCEDURE — 96401 CHEMO ANTI-NEOPL SQ/IM: CPT

## 2021-11-30 RX ORDER — SECUKINUMAB 150 MG/ML
150 INJECTION SUBCUTANEOUS
Refills: 0 | Status: COMPLETED | OUTPATIENT
Start: 2021-11-30

## 2021-11-30 RX ADMIN — SECUKINUMAB 0 MG/ML: 150 INJECTION SUBCUTANEOUS at 00:00

## 2021-12-01 ENCOUNTER — TRANSCRIPTION ENCOUNTER (OUTPATIENT)
Age: 51
End: 2021-12-01

## 2021-12-02 ENCOUNTER — TRANSCRIPTION ENCOUNTER (OUTPATIENT)
Age: 51
End: 2021-12-02

## 2021-12-03 ENCOUNTER — TRANSCRIPTION ENCOUNTER (OUTPATIENT)
Age: 51
End: 2021-12-03

## 2021-12-04 ENCOUNTER — APPOINTMENT (OUTPATIENT)
Dept: DISASTER EMERGENCY | Facility: CLINIC | Age: 51
End: 2021-12-04

## 2021-12-04 ENCOUNTER — NON-APPOINTMENT (OUTPATIENT)
Age: 51
End: 2021-12-04

## 2021-12-05 LAB — SARS-COV-2 N GENE NPH QL NAA+PROBE: NOT DETECTED

## 2021-12-07 ENCOUNTER — APPOINTMENT (OUTPATIENT)
Dept: GASTROENTEROLOGY | Facility: CLINIC | Age: 51
End: 2021-12-07
Payer: MEDICAID

## 2021-12-07 ENCOUNTER — LABORATORY RESULT (OUTPATIENT)
Age: 51
End: 2021-12-07

## 2021-12-07 PROCEDURE — 43239 EGD BIOPSY SINGLE/MULTIPLE: CPT

## 2021-12-07 PROCEDURE — 45378 DIAGNOSTIC COLONOSCOPY: CPT | Mod: 33

## 2021-12-08 ENCOUNTER — APPOINTMENT (OUTPATIENT)
Dept: RHEUMATOLOGY | Facility: CLINIC | Age: 51
End: 2021-12-08
Payer: MEDICAID

## 2021-12-08 VITALS
BODY MASS INDEX: 34.83 KG/M2 | OXYGEN SATURATION: 98 % | WEIGHT: 204 LBS | HEART RATE: 112 BPM | HEIGHT: 64 IN | SYSTOLIC BLOOD PRESSURE: 128 MMHG | TEMPERATURE: 98.7 F | RESPIRATION RATE: 16 BRPM | DIASTOLIC BLOOD PRESSURE: 79 MMHG

## 2021-12-08 PROCEDURE — 96401 CHEMO ANTI-NEOPL SQ/IM: CPT

## 2021-12-08 RX ORDER — SECUKINUMAB 150 MG/ML
150 INJECTION SUBCUTANEOUS
Refills: 0 | Status: COMPLETED | OUTPATIENT
Start: 2021-12-08

## 2021-12-08 RX ADMIN — SECUKINUMAB 0 MG/ML: 150 INJECTION SUBCUTANEOUS at 00:00

## 2021-12-09 ENCOUNTER — TRANSCRIPTION ENCOUNTER (OUTPATIENT)
Age: 51
End: 2021-12-09

## 2021-12-10 ENCOUNTER — TRANSCRIPTION ENCOUNTER (OUTPATIENT)
Age: 51
End: 2021-12-10

## 2021-12-13 ENCOUNTER — APPOINTMENT (OUTPATIENT)
Dept: ENDOCRINOLOGY | Facility: CLINIC | Age: 51
End: 2021-12-13
Payer: MEDICAID

## 2021-12-13 VITALS
DIASTOLIC BLOOD PRESSURE: 78 MMHG | HEIGHT: 64.02 IN | WEIGHT: 208.99 LBS | OXYGEN SATURATION: 95 % | TEMPERATURE: 96.4 F | SYSTOLIC BLOOD PRESSURE: 127 MMHG | HEART RATE: 98 BPM | BODY MASS INDEX: 35.68 KG/M2

## 2021-12-13 PROCEDURE — 99202 OFFICE O/P NEW SF 15 MIN: CPT | Mod: 25

## 2021-12-15 ENCOUNTER — NON-APPOINTMENT (OUTPATIENT)
Age: 51
End: 2021-12-15

## 2021-12-15 LAB
THYROGLOB AB SERPL-ACNC: <20 IU/ML
THYROPEROXIDASE AB SERPL IA-ACNC: 12.1 IU/ML

## 2021-12-15 NOTE — HISTORY OF PRESENT ILLNESS
[FreeTextEntry1] : CC: Thyroid nodule\par This is a 51-year-old female with a history of breast cancer status post right mastectomy, hepatic cyst, splenic lesions, anemia, psoriatic arthritis, here for evaluation of thyroid nodule.\par CT chest from August 2021 showed unchanged subcentimeter calcified left thyroid nodule.\par No history of radiation therapy to the head or neck.\par No family history of thyroid cancer.\par No obstructive symptoms.

## 2021-12-15 NOTE — REASON FOR VISIT
[Consultation] : a consultation visit [Thyroid nodule/ MNG] : thyroid nodule/ MNG [FreeTextEntry2] : Dr Naranjo

## 2021-12-15 NOTE — REVIEW OF SYSTEMS
[Fatigue] : fatigue [Recent Weight Gain (___ Lbs)] : recent weight gain: [unfilled] lbs [Constipation] : constipation [All other systems negative] : All other systems negative

## 2021-12-15 NOTE — PHYSICAL EXAM
[Alert] : alert [Well Nourished] : well nourished [Healthy Appearance] : healthy appearance [Obese] : obese [No Acute Distress] : no acute distress [Well Developed] : well developed [Normal Voice/Communication] : normal voice communication [Normal Sclera/Conjunctiva] : normal sclera/conjunctiva [No Neck Mass] : no neck mass was observed [No LAD] : no lymphadenopathy [Supple] : the neck was supple [Normal Rate] : heart rate was normal [No Tremors] : no tremors [Normal Affect] : the affect was normal [Normal Insight/Judgement] : insight and judgment were intact [Normal Mood] : the mood was normal

## 2021-12-15 NOTE — ASSESSMENT
[FreeTextEntry1] : This is a 51-year-old female with a history of breast cancer status post right mastectomy, hepatic cyst, splenic lesions, anemia, psoriatic arthritis, here for evaluation of thyroid nodule.\par CT chest from August 2021 showed unchanged subcentimeter calcified left thyroid nodule.\par No history of radiation therapy to the head or neck.\par No family history of thyroid cancer.\par No obstructive symptoms.\par Check thyroid ultrasound to characterize thyroid nodule.\par TFTs are normal.\par She is clinically euthyroid.\par Check thyroid antibodies to evaluate for autoimmune thyroid disease given history of psoriatic arthritis.\par Further management pending above results.

## 2021-12-17 ENCOUNTER — APPOINTMENT (OUTPATIENT)
Dept: OBGYN | Facility: CLINIC | Age: 51
End: 2021-12-17
Payer: MEDICAID

## 2021-12-17 VITALS — SYSTOLIC BLOOD PRESSURE: 132 MMHG | DIASTOLIC BLOOD PRESSURE: 83 MMHG | HEART RATE: 108 BPM | HEIGHT: 64 IN

## 2021-12-17 DIAGNOSIS — R10.2 PELVIC AND PERINEAL PAIN: ICD-10-CM

## 2021-12-17 PROCEDURE — 99386 PREV VISIT NEW AGE 40-64: CPT

## 2021-12-17 RX ORDER — ALPRAZOLAM 0.25 MG/1
0.25 TABLET ORAL
Qty: 15 | Refills: 0 | Status: DISCONTINUED | COMMUNITY
Start: 2021-04-13 | End: 2021-12-17

## 2021-12-17 NOTE — HISTORY OF PRESENT ILLNESS
[TextBox_4] : 2014 BSO\par onc at [facility]\par 81 yo dad pancr CA --TO/JONELLE [Mammogramdate] : 2020 [BreastSonogramDate] : 2020 [PapSmeardate] : 2020 [BoneDensityDate] : 2020 [ColonoscopyDate] : 2021

## 2021-12-17 NOTE — PHYSICAL EXAM
[Chaperone Present] : A chaperone was present in the examining room during all aspects of the physical examination [Appropriately responsive] : appropriately responsive [Alert] : alert [No Acute Distress] : no acute distress [No Lymphadenopathy] : no lymphadenopathy [Soft] : soft [Non-tender] : non-tender [Non-distended] : non-distended [No HSM] : No HSM [No Lesions] : no lesions [No Mass] : no mass [Oriented x3] : oriented x3 [Examination Of The Breasts] : a normal appearance [No Masses] : no breast masses were palpable [Labia Majora] : normal [Labia Minora] : normal [Normal] : normal [Uterine Adnexae] : normal [Right Breast Absent] : a total mastectomy [Breast Reconstruction Right] : breast reconstruction

## 2021-12-19 LAB — HPV HIGH+LOW RISK DNA PNL CVX: NOT DETECTED

## 2021-12-22 LAB — CYTOLOGY CVX/VAG DOC THIN PREP: ABNORMAL

## 2021-12-27 ENCOUNTER — TRANSCRIPTION ENCOUNTER (OUTPATIENT)
Age: 51
End: 2021-12-27

## 2021-12-27 ENCOUNTER — APPOINTMENT (OUTPATIENT)
Dept: ULTRASOUND IMAGING | Facility: IMAGING CENTER | Age: 51
End: 2021-12-27
Payer: MEDICAID

## 2021-12-27 ENCOUNTER — OUTPATIENT (OUTPATIENT)
Dept: OUTPATIENT SERVICES | Facility: HOSPITAL | Age: 51
LOS: 1 days | End: 2021-12-27
Payer: MEDICAID

## 2021-12-27 DIAGNOSIS — Z90.10 ACQUIRED ABSENCE OF UNSPECIFIED BREAST AND NIPPLE: Chronic | ICD-10-CM

## 2021-12-27 DIAGNOSIS — Z00.8 ENCOUNTER FOR OTHER GENERAL EXAMINATION: ICD-10-CM

## 2021-12-27 DIAGNOSIS — Z90.722 ACQUIRED ABSENCE OF OVARIES, BILATERAL: Chronic | ICD-10-CM

## 2021-12-27 PROCEDURE — 76536 US EXAM OF HEAD AND NECK: CPT

## 2021-12-27 PROCEDURE — 76536 US EXAM OF HEAD AND NECK: CPT | Mod: 26

## 2022-01-05 ENCOUNTER — TRANSCRIPTION ENCOUNTER (OUTPATIENT)
Age: 52
End: 2022-01-05

## 2022-01-10 ENCOUNTER — TRANSCRIPTION ENCOUNTER (OUTPATIENT)
Age: 52
End: 2022-01-10

## 2022-01-12 ENCOUNTER — RX RENEWAL (OUTPATIENT)
Age: 52
End: 2022-01-12

## 2022-01-26 ENCOUNTER — APPOINTMENT (OUTPATIENT)
Dept: RHEUMATOLOGY | Facility: CLINIC | Age: 52
End: 2022-01-26
Payer: MEDICAID

## 2022-01-26 ENCOUNTER — NON-APPOINTMENT (OUTPATIENT)
Age: 52
End: 2022-01-26

## 2022-01-26 VITALS
DIASTOLIC BLOOD PRESSURE: 78 MMHG | TEMPERATURE: 97.9 F | HEIGHT: 64 IN | HEART RATE: 108 BPM | WEIGHT: 205 LBS | OXYGEN SATURATION: 95 % | BODY MASS INDEX: 35 KG/M2 | SYSTOLIC BLOOD PRESSURE: 143 MMHG

## 2022-01-26 PROCEDURE — 99214 OFFICE O/P EST MOD 30 MIN: CPT | Mod: GC

## 2022-01-28 LAB
ALBUMIN SERPL ELPH-MCNC: 4.8 G/DL
ALP BLD-CCNC: 105 U/L
ALT SERPL-CCNC: 72 U/L
ANION GAP SERPL CALC-SCNC: 12 MMOL/L
AST SERPL-CCNC: 61 U/L
BASOPHILS # BLD AUTO: 0.04 K/UL
BASOPHILS NFR BLD AUTO: 0.6 %
BILIRUB SERPL-MCNC: 0.5 MG/DL
BUN SERPL-MCNC: 18 MG/DL
CALCIUM SERPL-MCNC: 9.9 MG/DL
CHLORIDE SERPL-SCNC: 103 MMOL/L
CO2 SERPL-SCNC: 28 MMOL/L
CREAT SERPL-MCNC: 0.82 MG/DL
CRP SERPL-MCNC: 6 MG/L
EOSINOPHIL # BLD AUTO: 0.15 K/UL
EOSINOPHIL NFR BLD AUTO: 2.2 %
ERYTHROCYTE [SEDIMENTATION RATE] IN BLOOD BY WESTERGREN METHOD: 7 MM/HR
GLUCOSE SERPL-MCNC: 126 MG/DL
HCT VFR BLD CALC: 40.1 %
HGB BLD-MCNC: 12.3 G/DL
IMM GRANULOCYTES NFR BLD AUTO: 0.3 %
LYMPHOCYTES # BLD AUTO: 2.28 K/UL
LYMPHOCYTES NFR BLD AUTO: 32.9 %
MAN DIFF?: NORMAL
MCHC RBC-ENTMCNC: 27.2 PG
MCHC RBC-ENTMCNC: 30.7 GM/DL
MCV RBC AUTO: 88.7 FL
MONOCYTES # BLD AUTO: 0.45 K/UL
MONOCYTES NFR BLD AUTO: 6.5 %
NEUTROPHILS # BLD AUTO: 3.98 K/UL
NEUTROPHILS NFR BLD AUTO: 57.5 %
PLATELET # BLD AUTO: 258 K/UL
POTASSIUM SERPL-SCNC: 4.1 MMOL/L
PROT SERPL-MCNC: 6.9 G/DL
RBC # BLD: 4.52 M/UL
RBC # FLD: 13.5 %
SODIUM SERPL-SCNC: 143 MMOL/L
WBC # FLD AUTO: 6.92 K/UL

## 2022-01-31 ENCOUNTER — TRANSCRIPTION ENCOUNTER (OUTPATIENT)
Age: 52
End: 2022-01-31

## 2022-01-31 ENCOUNTER — APPOINTMENT (OUTPATIENT)
Dept: NEUROLOGY | Facility: CLINIC | Age: 52
End: 2022-01-31

## 2022-02-05 ENCOUNTER — NON-APPOINTMENT (OUTPATIENT)
Age: 52
End: 2022-02-05

## 2022-02-12 ENCOUNTER — RX RENEWAL (OUTPATIENT)
Age: 52
End: 2022-02-12

## 2022-02-23 ENCOUNTER — APPOINTMENT (OUTPATIENT)
Dept: INTERNAL MEDICINE | Facility: CLINIC | Age: 52
End: 2022-02-23
Payer: MEDICAID

## 2022-02-23 VITALS
HEART RATE: 128 BPM | SYSTOLIC BLOOD PRESSURE: 120 MMHG | HEIGHT: 64 IN | WEIGHT: 220 LBS | OXYGEN SATURATION: 98 % | TEMPERATURE: 98.5 F | DIASTOLIC BLOOD PRESSURE: 90 MMHG | BODY MASS INDEX: 37.56 KG/M2

## 2022-02-23 VITALS — DIASTOLIC BLOOD PRESSURE: 90 MMHG | SYSTOLIC BLOOD PRESSURE: 120 MMHG

## 2022-02-23 DIAGNOSIS — Z86.59 PERSONAL HISTORY OF OTHER MENTAL AND BEHAVIORAL DISORDERS: ICD-10-CM

## 2022-02-23 DIAGNOSIS — M19.90 UNSPECIFIED OSTEOARTHRITIS, UNSPECIFIED SITE: ICD-10-CM

## 2022-02-23 PROCEDURE — 99214 OFFICE O/P EST MOD 30 MIN: CPT

## 2022-02-23 RX ORDER — DULOXETINE HYDROCHLORIDE 40 MG/1
40 CAPSULE, DELAYED RELEASE PELLETS ORAL
Qty: 14 | Refills: 0 | Status: DISCONTINUED | COMMUNITY
Start: 2021-09-02 | End: 2022-02-23

## 2022-02-23 NOTE — HISTORY OF PRESENT ILLNESS
[FreeTextEntry1] : anxiety, back pain [de-identified] : vaccine- pt thinks her inflammatory arthritis is from COVID vac- J+ J. pt is afraid to get flu vac.  didn't get COVID booster\par diet- mainly plant based\par exercise- walking 30 min- 2-3 /wk\par reviewed 1/28/21 labs- elev LFT\par reviewed 12/7/21 colonoscopy- hemorrhoids, 12/7/21 EGD- polyp, non erosive gastritis.\par thyroid  - US 9/10/21.  reviewed 12/13/21 Endo note- reviewed 12/27/21 US thyroid- calcif in thyroid\par -reviewed 12/17/22 Gyn note- rec US.  reviewed 12/17/21 PAP.  pt did color genetic testing for CA 12/17/21- neg as per  GYN notes\par chg in bm, incr flatulence- mild constipation- now resolved\par reviewed 1/26/22 Rheum note- - back pain got worse after decr cymbalta so incr cymbalta\par achy jts- has psoriatic arthritis- finger pain better w cymbalta- 60 % better, shoulder pain 50 % better- will stop stellara and start Consentyx\par no anxiety now since pain is better\par fatigue is 50% better\par hx of chronic back pain- 80% better since taking cymbalta. seen spine, pain manage ment\par hx of breast CA- being f/u at [facility] abd 5/11/21 sacral mets.  MRI - atypical hemangioma, spondylosis.  PET scan- as per pt, it showed inflammation.  had breast exam [place] [facility] [place] few mo ago\par pt noticed discoloration on R foot\par obesity- concerned about wgt loss

## 2022-02-23 NOTE — PLAN
[FreeTextEntry1] : elev BP - low salt diet\par breast CA, psoarsis- Stable. Continue establish treatment plan.\par

## 2022-02-24 LAB
ALBUMIN SERPL ELPH-MCNC: 4.9 G/DL
ALP BLD-CCNC: 107 U/L
ALT SERPL-CCNC: 113 U/L
ANION GAP SERPL CALC-SCNC: 14 MMOL/L
AST SERPL-CCNC: 90 U/L
BILIRUB SERPL-MCNC: 0.8 MG/DL
BUN SERPL-MCNC: 14 MG/DL
CALCIUM SERPL-MCNC: 9.8 MG/DL
CHLORIDE SERPL-SCNC: 104 MMOL/L
CO2 SERPL-SCNC: 24 MMOL/L
CREAT SERPL-MCNC: 0.57 MG/DL
GLUCOSE SERPL-MCNC: 105 MG/DL
POTASSIUM SERPL-SCNC: 4.1 MMOL/L
PROT SERPL-MCNC: 7.4 G/DL
SODIUM SERPL-SCNC: 142 MMOL/L

## 2022-03-05 ENCOUNTER — TRANSCRIPTION ENCOUNTER (OUTPATIENT)
Age: 52
End: 2022-03-05

## 2022-03-11 ENCOUNTER — APPOINTMENT (OUTPATIENT)
Dept: OPHTHALMOLOGY | Facility: CLINIC | Age: 52
End: 2022-03-11
Payer: MEDICAID

## 2022-03-11 ENCOUNTER — NON-APPOINTMENT (OUTPATIENT)
Age: 52
End: 2022-03-11

## 2022-03-11 PROCEDURE — 92012 INTRM OPH EXAM EST PATIENT: CPT

## 2022-03-15 ENCOUNTER — TRANSCRIPTION ENCOUNTER (OUTPATIENT)
Age: 52
End: 2022-03-15

## 2022-03-15 ENCOUNTER — APPOINTMENT (OUTPATIENT)
Dept: ULTRASOUND IMAGING | Facility: CLINIC | Age: 52
End: 2022-03-15
Payer: MEDICAID

## 2022-03-15 ENCOUNTER — OUTPATIENT (OUTPATIENT)
Dept: OUTPATIENT SERVICES | Facility: HOSPITAL | Age: 52
LOS: 1 days | End: 2022-03-15
Payer: MEDICAID

## 2022-03-15 ENCOUNTER — NON-APPOINTMENT (OUTPATIENT)
Age: 52
End: 2022-03-15

## 2022-03-15 DIAGNOSIS — Z90.722 ACQUIRED ABSENCE OF OVARIES, BILATERAL: Chronic | ICD-10-CM

## 2022-03-15 DIAGNOSIS — R79.89 OTHER SPECIFIED ABNORMAL FINDINGS OF BLOOD CHEMISTRY: ICD-10-CM

## 2022-03-15 DIAGNOSIS — Z00.8 ENCOUNTER FOR OTHER GENERAL EXAMINATION: ICD-10-CM

## 2022-03-15 DIAGNOSIS — Z90.10 ACQUIRED ABSENCE OF UNSPECIFIED BREAST AND NIPPLE: Chronic | ICD-10-CM

## 2022-03-15 PROCEDURE — 76700 US EXAM ABDOM COMPLETE: CPT | Mod: 26

## 2022-03-15 PROCEDURE — 76700 US EXAM ABDOM COMPLETE: CPT

## 2022-03-21 ENCOUNTER — TRANSCRIPTION ENCOUNTER (OUTPATIENT)
Age: 52
End: 2022-03-21

## 2022-03-23 ENCOUNTER — APPOINTMENT (OUTPATIENT)
Dept: OPHTHALMOLOGY | Facility: CLINIC | Age: 52
End: 2022-03-23

## 2022-03-29 ENCOUNTER — NON-APPOINTMENT (OUTPATIENT)
Age: 52
End: 2022-03-29

## 2022-03-29 ENCOUNTER — APPOINTMENT (OUTPATIENT)
Dept: ULTRASOUND IMAGING | Facility: CLINIC | Age: 52
End: 2022-03-29
Payer: MEDICAID

## 2022-03-29 ENCOUNTER — RESULT REVIEW (OUTPATIENT)
Age: 52
End: 2022-03-29

## 2022-03-29 PROCEDURE — 76982 USE 1ST TARGET LESION: CPT | Mod: 59

## 2022-03-29 PROCEDURE — 76700 US EXAM ABDOM COMPLETE: CPT

## 2022-04-05 ENCOUNTER — APPOINTMENT (OUTPATIENT)
Dept: GASTROENTEROLOGY | Facility: CLINIC | Age: 52
End: 2022-04-05
Payer: MEDICAID

## 2022-04-05 VITALS
BODY MASS INDEX: 37.22 KG/M2 | SYSTOLIC BLOOD PRESSURE: 122 MMHG | DIASTOLIC BLOOD PRESSURE: 86 MMHG | WEIGHT: 218 LBS | HEART RATE: 126 BPM | HEIGHT: 64 IN | TEMPERATURE: 97.8 F

## 2022-04-05 PROCEDURE — 99214 OFFICE O/P EST MOD 30 MIN: CPT

## 2022-04-05 NOTE — HISTORY OF PRESENT ILLNESS
[FreeTextEntry1] : 50 yo female with h/o abnormal lfts for 2 years, has a hepatologist  2 years ago, and she wanted to do liver biopsy, not done, has f/u in 2 weeks. abdominal us reveals fattyliver, elastography reveals F3. Patient reports recent weight gain. normal bms, no brbpr, no melena. no abdominal pain.\par \par s/p egd/colonoscopy  in 12/2021 internal hemorrhoids, small hiatal hernia, gastric polyp,mild gastrits

## 2022-04-05 NOTE — PHYSICAL EXAM
[General Appearance - Alert] : alert [General Appearance - In No Acute Distress] : in no acute distress [General Appearance - Well Nourished] : well nourished [General Appearance - Well Developed] : well developed [FreeTextEntry1] : overweight [Sclera] : the sclera and conjunctiva were normal [] : no respiratory distress [Auscultation Breath Sounds / Voice Sounds] : lungs were clear to auscultation bilaterally [Heart Sounds] : normal S1 and S2 [Bowel Sounds] : normal bowel sounds [Abdomen Soft] : soft [Abdomen Tenderness] : non-tender [No CVA Tenderness] : no ~M costovertebral angle tenderness [Abnormal Walk] : normal gait [Nail Clubbing] : no clubbing  or cyanosis of the fingernails [No Focal Deficits] : no focal deficits [Oriented To Time, Place, And Person] : oriented to person, place, and time

## 2022-04-05 NOTE — REVIEW OF SYSTEMS
[Fever] : no fever [Chills] : no chills [Eyesight Problems] : no eyesight problems [Discharge From Eyes] : no purulent discharge from the eyes [Nosebleeds] : no nosebleeds [Nasal Discharge] : no nasal discharge [Chest Pain] : no chest pain [Cough] : no cough [SOB on Exertion] : no shortness of breath during exertion [Dysuria] : no dysuria [Limb Pain] : no limb pain [Limb Swelling] : no limb swelling [Itching] : no itching [Change In A Mole] : no change in a mole [Dizziness] : no dizziness [Anxiety] : no anxiety [Depression] : no depression [Muscle Weakness] : no muscle weakness [Swollen Glands] : no swollen glands

## 2022-04-05 NOTE — ASSESSMENT
[FreeTextEntry1] : NAFLD, us elastography F3 disease, recent egd last year no varices, abnormal lfts, hepatits, b and c negative\par \par plan pt to f/u with hepatology in 2 weeks\par           f/u with gi as needed\par          further management per hepatology\par          diet and exercise for weight loss\par        no nsaids, herbal supplements, no etoh\par           recommend nutrition consult

## 2022-04-13 ENCOUNTER — TRANSCRIPTION ENCOUNTER (OUTPATIENT)
Age: 52
End: 2022-04-13

## 2022-05-03 ENCOUNTER — APPOINTMENT (OUTPATIENT)
Dept: RHEUMATOLOGY | Facility: CLINIC | Age: 52
End: 2022-05-03
Payer: MEDICAID

## 2022-05-03 VITALS
OXYGEN SATURATION: 96 % | SYSTOLIC BLOOD PRESSURE: 122 MMHG | WEIGHT: 218 LBS | DIASTOLIC BLOOD PRESSURE: 85 MMHG | TEMPERATURE: 97.9 F | HEART RATE: 111 BPM | HEIGHT: 64 IN | BODY MASS INDEX: 37.22 KG/M2

## 2022-05-03 PROCEDURE — 99214 OFFICE O/P EST MOD 30 MIN: CPT

## 2022-05-03 RX ORDER — HYDROCORTISONE 25 MG/G
2.5 OINTMENT TOPICAL
Qty: 20 | Refills: 0 | Status: ACTIVE | COMMUNITY
Start: 2022-04-14

## 2022-05-03 RX ORDER — SECUKINUMAB 150 MG/ML
150 INJECTION SUBCUTANEOUS
Qty: 6 | Refills: 0 | Status: ACTIVE | COMMUNITY
Start: 2021-11-24

## 2022-05-04 ENCOUNTER — TRANSCRIPTION ENCOUNTER (OUTPATIENT)
Age: 52
End: 2022-05-04

## 2022-05-04 LAB
ALBUMIN SERPL ELPH-MCNC: 5 G/DL
ALP BLD-CCNC: 98 U/L
ALT SERPL-CCNC: 80 U/L
ANION GAP SERPL CALC-SCNC: 13 MMOL/L
AST SERPL-CCNC: 56 U/L
BASOPHILS # BLD AUTO: 0.07 K/UL
BASOPHILS NFR BLD AUTO: 1.1 %
BILIRUB SERPL-MCNC: 0.6 MG/DL
BUN SERPL-MCNC: 14 MG/DL
CALCIUM SERPL-MCNC: 10.2 MG/DL
CHLORIDE SERPL-SCNC: 106 MMOL/L
CO2 SERPL-SCNC: 26 MMOL/L
CREAT SERPL-MCNC: 0.74 MG/DL
CRP SERPL-MCNC: 4 MG/L
EGFR: 97 ML/MIN/1.73M2
EOSINOPHIL # BLD AUTO: 0.17 K/UL
EOSINOPHIL NFR BLD AUTO: 2.6 %
ERYTHROCYTE [SEDIMENTATION RATE] IN BLOOD BY WESTERGREN METHOD: 15 MM/HR
GLUCOSE SERPL-MCNC: 100 MG/DL
HCT VFR BLD CALC: 42.4 %
HGB BLD-MCNC: 13.4 G/DL
IMM GRANULOCYTES NFR BLD AUTO: 0.3 %
LYMPHOCYTES # BLD AUTO: 2.11 K/UL
LYMPHOCYTES NFR BLD AUTO: 32.2 %
MAN DIFF?: NORMAL
MCHC RBC-ENTMCNC: 28 PG
MCHC RBC-ENTMCNC: 31.6 GM/DL
MCV RBC AUTO: 88.5 FL
MONOCYTES # BLD AUTO: 0.57 K/UL
MONOCYTES NFR BLD AUTO: 8.7 %
NEUTROPHILS # BLD AUTO: 3.61 K/UL
NEUTROPHILS NFR BLD AUTO: 55.1 %
PLATELET # BLD AUTO: 242 K/UL
POTASSIUM SERPL-SCNC: 4.4 MMOL/L
PROT SERPL-MCNC: 6.9 G/DL
RBC # BLD: 4.79 M/UL
RBC # FLD: 12.6 %
SODIUM SERPL-SCNC: 145 MMOL/L
URATE SERPL-MCNC: 5.2 MG/DL
WBC # FLD AUTO: 6.55 K/UL

## 2022-05-05 ENCOUNTER — TRANSCRIPTION ENCOUNTER (OUTPATIENT)
Age: 52
End: 2022-05-05

## 2022-05-06 ENCOUNTER — TRANSCRIPTION ENCOUNTER (OUTPATIENT)
Age: 52
End: 2022-05-06

## 2022-05-24 ENCOUNTER — RX RENEWAL (OUTPATIENT)
Age: 52
End: 2022-05-24

## 2022-05-26 ENCOUNTER — NON-APPOINTMENT (OUTPATIENT)
Age: 52
End: 2022-05-26

## 2022-05-31 ENCOUNTER — APPOINTMENT (OUTPATIENT)
Dept: INTERNAL MEDICINE | Facility: CLINIC | Age: 52
End: 2022-05-31
Payer: MEDICAID

## 2022-05-31 VITALS
RESPIRATION RATE: 16 BRPM | HEART RATE: 108 BPM | HEIGHT: 64 IN | SYSTOLIC BLOOD PRESSURE: 110 MMHG | WEIGHT: 220 LBS | TEMPERATURE: 97.6 F | DIASTOLIC BLOOD PRESSURE: 75 MMHG | BODY MASS INDEX: 37.56 KG/M2 | OXYGEN SATURATION: 98 %

## 2022-05-31 PROCEDURE — 99213 OFFICE O/P EST LOW 20 MIN: CPT

## 2022-05-31 RX ORDER — NEOMYCIN AND POLYMYXIN B SULFATES AND DEXAMETHASONE 3.5; 10000; 1 MG/G; [IU]/G; MG/G
3.5-10000-0.1 OINTMENT OPHTHALMIC
Qty: 4 | Refills: 0 | Status: DISCONTINUED | COMMUNITY
Start: 2022-04-06 | End: 2022-05-31

## 2022-05-31 RX ORDER — DOXYCYCLINE 50 MG/1
50 CAPSULE ORAL
Qty: 60 | Refills: 0 | Status: DISCONTINUED | COMMUNITY
Start: 2022-04-07 | End: 2022-05-31

## 2022-05-31 RX ORDER — DICLOFENAC SODIUM 75 MG/1
75 TABLET, DELAYED RELEASE ORAL
Qty: 15 | Refills: 0 | Status: DISCONTINUED | COMMUNITY
Start: 2022-01-26 | End: 2022-05-31

## 2022-05-31 NOTE — HISTORY OF PRESENT ILLNESS
[FreeTextEntry1] : f/u with BP,elevated LFTs [de-identified] : Pt reports feeling well. \par Pt had rheumatology in the beginning of May. Lft's still elevated but better.\par No c/p,palp.SOB.

## 2022-06-07 ENCOUNTER — RX RENEWAL (OUTPATIENT)
Age: 52
End: 2022-06-07

## 2022-06-10 ENCOUNTER — TRANSCRIPTION ENCOUNTER (OUTPATIENT)
Age: 52
End: 2022-06-10

## 2022-06-13 ENCOUNTER — TRANSCRIPTION ENCOUNTER (OUTPATIENT)
Age: 52
End: 2022-06-13

## 2022-06-14 ENCOUNTER — TRANSCRIPTION ENCOUNTER (OUTPATIENT)
Age: 52
End: 2022-06-14

## 2022-06-15 ENCOUNTER — APPOINTMENT (OUTPATIENT)
Dept: ENDOCRINOLOGY | Facility: CLINIC | Age: 52
End: 2022-06-15

## 2022-06-17 ENCOUNTER — TRANSCRIPTION ENCOUNTER (OUTPATIENT)
Age: 52
End: 2022-06-17

## 2022-07-05 ENCOUNTER — APPOINTMENT (OUTPATIENT)
Dept: PEDIATRIC ALLERGY IMMUNOLOGY | Facility: CLINIC | Age: 52
End: 2022-07-05

## 2022-07-05 VITALS
OXYGEN SATURATION: 96 % | TEMPERATURE: 96.7 F | HEART RATE: 112 BPM | DIASTOLIC BLOOD PRESSURE: 77 MMHG | SYSTOLIC BLOOD PRESSURE: 119 MMHG

## 2022-07-05 LAB
BASOPHILS # BLD AUTO: 0.04 K/UL
BASOPHILS NFR BLD AUTO: 0.6 %
EOSINOPHIL # BLD AUTO: 0.22 K/UL
EOSINOPHIL NFR BLD AUTO: 3.6 %
HCT VFR BLD CALC: 41.4 %
HGB BLD-MCNC: 13.4 G/DL
IMM GRANULOCYTES NFR BLD AUTO: 0.3 %
LYMPHOCYTES # BLD AUTO: 1.81 K/UL
LYMPHOCYTES NFR BLD AUTO: 29.3 %
MAN DIFF?: NORMAL
MCHC RBC-ENTMCNC: 28.2 PG
MCHC RBC-ENTMCNC: 32.4 GM/DL
MCV RBC AUTO: 87 FL
MONOCYTES # BLD AUTO: 0.45 K/UL
MONOCYTES NFR BLD AUTO: 7.3 %
NEUTROPHILS # BLD AUTO: 3.63 K/UL
NEUTROPHILS NFR BLD AUTO: 58.9 %
PLATELET # BLD AUTO: 228 K/UL
RBC # BLD: 4.76 M/UL
RBC # FLD: 13 %
WBC # FLD AUTO: 6.17 K/UL

## 2022-07-05 PROCEDURE — 36415 COLL VENOUS BLD VENIPUNCTURE: CPT

## 2022-07-05 PROCEDURE — 99244 OFF/OP CNSLTJ NEW/EST MOD 40: CPT | Mod: 25

## 2022-07-05 PROCEDURE — 99214 OFFICE O/P EST MOD 30 MIN: CPT | Mod: 25

## 2022-07-06 LAB
CD16+CD56+ CELLS # BLD: 150 /UL
CD16+CD56+ CELLS NFR BLD: 8 %
CD19 CELLS NFR BLD: 162 /UL
CD3 CELLS # BLD: 1456 /UL
CD3 CELLS NFR BLD: 82 %
CD3+CD4+ CELLS # BLD: 998 /UL
CD3+CD4+ CELLS NFR BLD: 56 %
CD3+CD4+ CELLS/CD3+CD8+ CLL SPEC: 2.26 RATIO
CD3+CD8+ CELLS # SPEC: 442 /UL
CD3+CD8+ CELLS NFR BLD: 25 %
CELLS.CD3-CD19+/CELLS IN BLOOD: 9 %
CH50 SERPL-MCNC: >102 U/ML
DEPRECATED KAPPA LC FREE/LAMBDA SER: 2.24 RATIO
IGA SER QL IEP: 36 MG/DL
IGG SER QL IEP: 620 MG/DL
IGM SER QL IEP: 32 MG/DL
KAPPA LC CSF-MCNC: 0.42 MG/DL
KAPPA LC SERPL-MCNC: 0.94 MG/DL
MEV IGG FLD QL IA: 183 AU/ML
MEV IGG+IGM SER-IMP: POSITIVE
MUV AB SER-ACNC: NEGATIVE
MUV IGG SER QL IA: <5 AU/ML
RUBV IGG FLD-ACNC: 5.5 INDEX
RUBV IGG SER-IMP: POSITIVE
VZV AB TITR SER: POSITIVE
VZV IGG SER IF-ACNC: 3570 INDEX

## 2022-07-06 NOTE — REVIEW OF SYSTEMS
[Nl] : Genitourinary [Immunizations are up to date] : Immunizations are up to date [de-identified] : see hpi [de-identified] : see hpi

## 2022-07-06 NOTE — HISTORY OF PRESENT ILLNESS
[de-identified] : 53 y/o F w/ psoriatic arthritis (Rx'ed multiple immunomodulators, currently well controlled on Cosentyx), hx of metastatic breast Ca s/p mastectomy, chemotherapy including Tamoxifen in 2000, recurrence in 2009 treated with radiation currently in remission. Previously saw Dr. Miller in fall 2021 for adverse reactions to latex and penicillin and possibly J&J COVID vaccine. \par \par 4 yrs ago was evaluated by [facility] Dr. Girard for immune screening. Pt was told that she had non protective titers to pneumococcus even though she was unlikely to have been vaccinated due to her age. Pneumococcal vaccination (not sure which one) was given and she was told she didn't respond appropriately. She was given a different pneumococcal vaccine (not sure which one) and was also told she didn’t respond to that either. She was started on Hyqvia SC ever 3-4 weeks. Patient states she got a "few doses" but stopped because her psoriasis flared up shortly after starting it.for psoriasis, she has tried multiple immunosuppressive including oral therapies like Otezla, other injectable biologics like Stellara with variable control. now on consentyx and working for her psoriasis\par \par she has been relatively infection free for most of her life, even when she had cancer or on active immunosuppression. She has had intermittent bronchitis (maybe 1 every 2-3 years at most) but denies PNA, ear infections. \par had covid 1/2021 - mild case, didn't require hospitalization. In April 2021 received Herrera&Herrera vaccine. About 2 weeks after developed muscle spasm of her back, recurred a few times. Has severe back, shoulder pain. Has been treated with NSAIDs, muscle relaxants, intraarticular steroid injections. Currently on Cymbalta. Had carried heavy boxes right before. But also suspects the vaccine was a potential trigger and inquiring if she should receive a booster. \par \par Of note, penicillin allergy was documented in patient's chart. But patient does not recall any adverse reactions to penicillin. Reports having used penicillin about 4 times in the past 10-15 years, all well tolerated.\par \par \par t

## 2022-07-06 NOTE — PHYSICAL EXAM
[Alert] : alert [Well Nourished] : well nourished [Healthy Appearance] : healthy appearance [No Acute Distress] : no acute distress [Well Developed] : well developed [Normal Pupil & Iris Size/Symmetry] : normal pupil and iris size and symmetry [No Discharge] : no discharge [No Photophobia] : no photophobia [Sclera Not Icteric] : sclera not icteric [Normal TMs] : both tympanic membranes were normal [Normal Nasal Mucosa] : the nasal mucosa was normal [Normal Lips/Tongue] : the lips and tongue were normal [Normal Outer Ear/Nose] : the ears and nose were normal in appearance [Normal Tonsils] : normal tonsils [No Thrush] : no thrush [Supple] : the neck was supple [Normal Rate and Effort] : normal respiratory rhythm and effort [No Crackles] : no crackles [No Retractions] : no retractions [Bilateral Audible Breath Sounds] : bilateral audible breath sounds [Normal Rate] : heart rate was normal  [Normal S1, S2] : normal S1 and S2 [No murmur] : no murmur [Regular Rhythm] : with a regular rhythm [Soft] : abdomen soft [Not Tender] : non-tender [Not Distended] : not distended [No HSM] : no hepato-splenomegaly [Normal Cervical Lymph Nodes] : cervical [Skin Intact] : skin intact  [No Rash] : no rash [No Skin Lesions] : no skin lesions [No clubbing] : no clubbing [No Edema] : no edema [No Cyanosis] : no cyanosis [Normal Mood] : mood was normal [Normal Affect] : affect was normal [Alert, Awake, Oriented as Age-Appropriate] : alert, awake, oriented as age appropriate [Suborbital Bogginess] : no suborbital bogginess (allergic shiners) [Pale mucosa] : no pale mucosa [Boggy Nasal Turbinates] : no boggy and/or pale nasal turbinates [Wheezing] : no wheezing was heard [de-identified] : no psoriatic patches on arms,  [de-identified] : ambulates with cane

## 2022-07-06 NOTE — DATA REVIEWED
[FreeTextEntry1] : 5.2022\par unremarkable CBC w/ diff\par unremarkable ESR, CRP\par CMP remarkable for elevated AST, ALT

## 2022-07-06 NOTE — CONSULT LETTER
[Dear  ___] : Dear  [unfilled], [Consult Letter:] : I had the pleasure of evaluating your patient, [unfilled]. [Please see my note below.] : Please see my note below. [Consult Closing:] : Thank you very much for allowing me to participate in the care of this patient.  If you have any questions, please do not hesitate to contact me. [Sincerely,] : Sincerely, [DrJazmyne  ___] : Dr. CARRANZA [FreeTextEntry3] : Eliazar Cooper III  MPH, MD, PhD, FACP, FACAAI, FAAAAI \par , Departments of Medicine and Pediatrics \par [employer] [facility] \par [employer], [facility] \par Attending Physician, Division of Allergy & Immunology [facility]\par \par \par

## 2022-07-08 LAB
C DIPHTHERIAE AB SER QL: <0.1 IU/ML
HAEM INFLU B AB SER-MCNC: 3.49 UG/ML
IGG SUBSET TOTAL IGG: 680 MG/DL
IGG1 SER-MCNC: 416 MG/DL
IGG2 SER-MCNC: 63 MG/DL
IGG3 SER-MCNC: 45 MG/DL
IGG4 SER-MCNC: 2 MG/DL

## 2022-07-11 ENCOUNTER — TRANSCRIPTION ENCOUNTER (OUTPATIENT)
Age: 52
End: 2022-07-11

## 2022-07-11 LAB — C TETANI IGG SER-ACNC: 0.52 IU/ML

## 2022-07-12 ENCOUNTER — TRANSCRIPTION ENCOUNTER (OUTPATIENT)
Age: 52
End: 2022-07-12

## 2022-07-12 LAB — MANNAN BINDING LECTIN (MBL): >4000 NG/ML

## 2022-07-14 ENCOUNTER — TRANSCRIPTION ENCOUNTER (OUTPATIENT)
Age: 52
End: 2022-07-14

## 2022-07-14 ENCOUNTER — APPOINTMENT (OUTPATIENT)
Dept: ENDOCRINOLOGY | Facility: CLINIC | Age: 52
End: 2022-07-14

## 2022-07-14 VITALS
BODY MASS INDEX: 40.9 KG/M2 | OXYGEN SATURATION: 97 % | HEART RATE: 104 BPM | WEIGHT: 225.1 LBS | DIASTOLIC BLOOD PRESSURE: 83 MMHG | TEMPERATURE: 97.5 F | SYSTOLIC BLOOD PRESSURE: 122 MMHG | HEIGHT: 62.01 IN

## 2022-07-14 LAB
COMPLEMENT, ALTERNATE PATHWAY (AH50): 99
DEPRECATED S PNEUM 1 IGG SER-MCNC: 1.2 MCG/ML
DEPRECATED S PNEUM12 AB SER-ACNC: <0.4 MCG/ML
DEPRECATED S PNEUM14 AB SER-ACNC: 0.4 MCG/ML
DEPRECATED S PNEUM17 IGG SER IA-MCNC: <0.4 MCG/ML
DEPRECATED S PNEUM18 IGG SER IA-MCNC: <0.4 MCG/ML
DEPRECATED S PNEUM19 IGG SER-MCNC: 2.9 MCG/ML
DEPRECATED S PNEUM19 IGG SER-MCNC: <0.4 MCG/ML
DEPRECATED S PNEUM2 IGG SER-MCNC: <0.4 MCG/ML
DEPRECATED S PNEUM20 IGG SER-MCNC: <0.4 MCG/ML
DEPRECATED S PNEUM22 IGG SER-MCNC: 2.7 MCG/ML
DEPRECATED S PNEUM23 AB SER-ACNC: 3.4 MCG/ML
DEPRECATED S PNEUM3 AB SER-ACNC: <0.4 MCG/ML
DEPRECATED S PNEUM34 IGG SER-MCNC: <0.4 MCG/ML
DEPRECATED S PNEUM4 AB SER-ACNC: <0.4 MCG/ML
DEPRECATED S PNEUM5 IGG SER-MCNC: 0.6 MCG/ML
DEPRECATED S PNEUM6 IGG SER-MCNC: 1.5 MCG/ML
DEPRECATED S PNEUM7 IGG SER-ACNC: 0.4 MCG/ML
DEPRECATED S PNEUM8 AB SER-ACNC: 0.4 MCG/ML
DEPRECATED S PNEUM9 AB SER-ACNC: NORMAL MCG/ML
DEPRECATED S PNEUM9 IGG SER-MCNC: <0.4 MCG/ML
LPT PW BLD-NRATE: NORMAL
LPT PW BLD-NRATE: NORMAL
STREPTOCOCCUS PNEUMONIAE SEROTYPE 11A: <0.4 MCG/ML
STREPTOCOCCUS PNEUMONIAE SEROTYPE 15B: <0.4 MCG/ML
STREPTOCOCCUS PNEUMONIAE SEROTYPE 33F: <0.4 MCG/ML

## 2022-07-14 PROCEDURE — 99213 OFFICE O/P EST LOW 20 MIN: CPT

## 2022-07-14 NOTE — HISTORY OF PRESENT ILLNESS
[FreeTextEntry1] : CC: Thyroid follow-up\par This is a 52-year-old female with a history of breast cancer status post right mastectomy, hepatic cyst, splenic lesions, anemia, psoriatic arthritis, fibromyalgia, here for evaluation of thyroid calcification.\par CT chest from August 2021 showed unchanged subcentimeter calcified left thyroid nodule.  Thyroid ultrasound from December 27, 2021 showed left 0.9 x 0.6 x 0.3 cm coarse calcification in the lower lobe, unchanged compared to prior.\par No history of radiation therapy to the head or neck.\par No family history of thyroid cancer.\par No obstructive symptoms.\par Thyroid antibodies are negative.

## 2022-07-14 NOTE — PHYSICAL EXAM
[Alert] : alert [Well Nourished] : well nourished [Healthy Appearance] : healthy appearance [Obese] : obese [No Acute Distress] : no acute distress [Well Developed] : well developed [Normal Voice/Communication] : normal voice communication [Normal Sclera/Conjunctiva] : normal sclera/conjunctiva [No Neck Mass] : no neck mass was observed [No LAD] : no lymphadenopathy [Supple] : the neck was supple [No Tremors] : no tremors [Normal Affect] : the affect was normal [Normal Insight/Judgement] : insight and judgment were intact [Normal Mood] : the mood was normal

## 2022-07-14 NOTE — REVIEW OF SYSTEMS
[Recent Weight Gain (___ Lbs)] : recent weight gain: [unfilled] lbs [Joint Pain] : joint pain [All other systems negative] : All other systems negative

## 2022-07-14 NOTE — ASSESSMENT
[FreeTextEntry1] : This is a 52-year-old female with a history of breast cancer status post right mastectomy, hepatic cyst, splenic lesions, anemia, psoriatic arthritis, fibromyalgia, here for follow-up of thyroid calcification. \par CT chest from August 2021 showed unchanged subcentimeter calcified left thyroid nodule.  Thyroid ultrasound from December 2021 showed 0.9 x 0.6 x 0.3 cm coarse calcification in the lower lobe.\par No history of radiation therapy to the head or neck.\par No family history of thyroid cancer.\par No obstructive symptoms.\par Check thyroid ultrasound.  She will also have PET/CT with [facility] and will send me results.\par Thyroid antibodies are negative.\par

## 2022-07-15 ENCOUNTER — TRANSCRIPTION ENCOUNTER (OUTPATIENT)
Age: 52
End: 2022-07-15

## 2022-07-18 ENCOUNTER — TRANSCRIPTION ENCOUNTER (OUTPATIENT)
Age: 52
End: 2022-07-18

## 2022-07-19 ENCOUNTER — APPOINTMENT (OUTPATIENT)
Dept: RHEUMATOLOGY | Facility: CLINIC | Age: 52
End: 2022-07-19

## 2022-07-19 ENCOUNTER — TRANSCRIPTION ENCOUNTER (OUTPATIENT)
Age: 52
End: 2022-07-19

## 2022-07-19 VITALS
BODY MASS INDEX: 40.88 KG/M2 | HEIGHT: 62.01 IN | TEMPERATURE: 97.6 F | DIASTOLIC BLOOD PRESSURE: 70 MMHG | SYSTOLIC BLOOD PRESSURE: 117 MMHG | OXYGEN SATURATION: 97 % | WEIGHT: 225 LBS | RESPIRATION RATE: 16 BRPM | HEART RATE: 106 BPM

## 2022-07-19 PROCEDURE — 99214 OFFICE O/P EST MOD 30 MIN: CPT

## 2022-07-20 ENCOUNTER — APPOINTMENT (OUTPATIENT)
Dept: ULTRASOUND IMAGING | Facility: CLINIC | Age: 52
End: 2022-07-20

## 2022-07-20 PROCEDURE — 76536 US EXAM OF HEAD AND NECK: CPT

## 2022-07-21 LAB
ALBUMIN SERPL ELPH-MCNC: 4.9 G/DL
ALP BLD-CCNC: 106 U/L
ALT SERPL-CCNC: 188 U/L
ANION GAP SERPL CALC-SCNC: 10 MMOL/L
AST SERPL-CCNC: 133 U/L
BASOPHILS # BLD AUTO: 0.05 K/UL
BASOPHILS NFR BLD AUTO: 0.7 %
BILIRUB SERPL-MCNC: 0.8 MG/DL
BUN SERPL-MCNC: 12 MG/DL
CALCIUM SERPL-MCNC: 10 MG/DL
CHLORIDE SERPL-SCNC: 103 MMOL/L
CO2 SERPL-SCNC: 28 MMOL/L
CREAT SERPL-MCNC: 0.64 MG/DL
CRP SERPL-MCNC: 5 MG/L
EGFR: 106 ML/MIN/1.73M2
EOSINOPHIL # BLD AUTO: 0.18 K/UL
EOSINOPHIL NFR BLD AUTO: 2.7 %
ERYTHROCYTE [SEDIMENTATION RATE] IN BLOOD BY WESTERGREN METHOD: 12 MM/HR
GLUCOSE SERPL-MCNC: 80 MG/DL
HAV IGM SER QL: NONREACTIVE
HBV CORE IGG+IGM SER QL: NONREACTIVE
HBV CORE IGM SER QL: NONREACTIVE
HBV SURFACE AG SER QL: NONREACTIVE
HCT VFR BLD CALC: 44.3 %
HCV AB SER QL: NONREACTIVE
HCV S/CO RATIO: 0.06 S/CO
HGB BLD-MCNC: 13.8 G/DL
IMM GRANULOCYTES NFR BLD AUTO: 0.3 %
LYMPHOCYTES # BLD AUTO: 1.96 K/UL
LYMPHOCYTES NFR BLD AUTO: 29 %
MAN DIFF?: NORMAL
MCHC RBC-ENTMCNC: 28 PG
MCHC RBC-ENTMCNC: 31.2 GM/DL
MCV RBC AUTO: 90 FL
MONOCYTES # BLD AUTO: 0.59 K/UL
MONOCYTES NFR BLD AUTO: 8.7 %
NEUTROPHILS # BLD AUTO: 3.95 K/UL
NEUTROPHILS NFR BLD AUTO: 58.6 %
PLATELET # BLD AUTO: 241 K/UL
POTASSIUM SERPL-SCNC: 4.3 MMOL/L
PROT SERPL-MCNC: 7 G/DL
RBC # BLD: 4.92 M/UL
RBC # FLD: 13 %
SODIUM SERPL-SCNC: 140 MMOL/L
WBC # FLD AUTO: 6.75 K/UL

## 2022-07-22 ENCOUNTER — RX RENEWAL (OUTPATIENT)
Age: 52
End: 2022-07-22

## 2022-07-22 LAB
M TB IFN-G BLD-IMP: NEGATIVE
QUANTIFERON TB PLUS MITOGEN MINUS NIL: >10 IU/ML
QUANTIFERON TB PLUS NIL: 0.03 IU/ML
QUANTIFERON TB PLUS TB1 MINUS NIL: -0.01 IU/ML
QUANTIFERON TB PLUS TB2 MINUS NIL: -0.01 IU/ML

## 2022-07-26 ENCOUNTER — NON-APPOINTMENT (OUTPATIENT)
Age: 52
End: 2022-07-26

## 2022-07-27 ENCOUNTER — APPOINTMENT (OUTPATIENT)
Dept: OPHTHALMOLOGY | Facility: CLINIC | Age: 52
End: 2022-07-27

## 2022-07-27 ENCOUNTER — NON-APPOINTMENT (OUTPATIENT)
Age: 52
End: 2022-07-27

## 2022-07-27 PROCEDURE — 92014 COMPRE OPH EXAM EST PT 1/>: CPT

## 2022-08-04 LAB
25(OH)D3 SERPL-MCNC: 23.1 NG/ML
ALBUMIN SERPL ELPH-MCNC: 4.8 G/DL
ALP BLD-CCNC: 97 U/L
ALT SERPL-CCNC: 132 U/L
ANION GAP SERPL CALC-SCNC: 13 MMOL/L
AST SERPL-CCNC: 88 U/L
BILIRUB SERPL-MCNC: 0.7 MG/DL
BUN SERPL-MCNC: 12 MG/DL
CALCIUM SERPL-MCNC: 10 MG/DL
CHLORIDE SERPL-SCNC: 102 MMOL/L
CO2 SERPL-SCNC: 27 MMOL/L
CREAT SERPL-MCNC: 0.67 MG/DL
EGFR: 105 ML/MIN/1.73M2
GLUCOSE SERPL-MCNC: 126 MG/DL
POTASSIUM SERPL-SCNC: 4.5 MMOL/L
PROT SERPL-MCNC: 6.5 G/DL
SODIUM SERPL-SCNC: 142 MMOL/L

## 2022-08-31 ENCOUNTER — APPOINTMENT (OUTPATIENT)
Dept: INTERNAL MEDICINE | Facility: CLINIC | Age: 52
End: 2022-08-31

## 2022-08-31 VITALS
SYSTOLIC BLOOD PRESSURE: 110 MMHG | OXYGEN SATURATION: 98 % | HEART RATE: 130 BPM | TEMPERATURE: 98.3 F | HEIGHT: 62 IN | DIASTOLIC BLOOD PRESSURE: 80 MMHG | WEIGHT: 228 LBS | BODY MASS INDEX: 41.96 KG/M2

## 2022-08-31 DIAGNOSIS — R03.0 ELEVATED BLOOD-PRESSURE READING, W/OUT DIAGNOSIS OF HYPERTENSION: ICD-10-CM

## 2022-08-31 DIAGNOSIS — R21 RASH AND OTHER NONSPECIFIC SKIN ERUPTION: ICD-10-CM

## 2022-08-31 DIAGNOSIS — Z01.818 ENCOUNTER FOR OTHER PREPROCEDURAL EXAMINATION: ICD-10-CM

## 2022-08-31 DIAGNOSIS — Z86.39 PERSONAL HISTORY OF OTHER ENDOCRINE, NUTRITIONAL AND METABOLIC DISEASE: ICD-10-CM

## 2022-08-31 DIAGNOSIS — M53.3 SACROCOCCYGEAL DISORDERS, NOT ELSEWHERE CLASSIFIED: ICD-10-CM

## 2022-08-31 PROCEDURE — 99214 OFFICE O/P EST MOD 30 MIN: CPT | Mod: 25

## 2022-08-31 PROCEDURE — 36415 COLL VENOUS BLD VENIPUNCTURE: CPT

## 2022-08-31 NOTE — PLAN
[FreeTextEntry1] : obesity, fatty liver- rec wgt loss- m.b. due to cymbalta- decr cymbalta to 40mg\par psoriatic arthritis- better but not controlled\par f/u CPE

## 2022-08-31 NOTE — HISTORY OF PRESENT ILLNESS
[FreeTextEntry1] : BP [de-identified] : reviewed 7/27/22 Ophtho notes\par exercise- walking 30 min- 2-3 /wk\par reviewed 8/2/22 non fasting labs- elev LFT, vit D 23- pt denies any alcohol or herbal supplements.  pt has f/u w hepatology\par low vit D- pt is not taking vit D.\par thyroid  - US 9/10/21.  reviewed 7/14/22 Endo note- reviewed 7/20/22 US thyroid- no chg\par reviewed 7/19/22 Rheum note- -diane card- has psoriatic arthritis-on Consentyx and cymbalta\par pt c/o wgt gain, sweats on cymbalta- a few months ago, pt try cymbalta 40 mg but pain came down\par obesity- not drinking juice, soda. not eating late\par chronic fatigue- not sleeping well\par hx of chronic back pain- 80% better since taking cymbalta. seen spine, pain manage ment\par hx of breast CA- being f/u at [facility]- CT abd 5/11/21 sacral mets.  MRI - atypical hemangioma, spondylosis. was in remission for 9 yr then dev mets to sacrum\par reviewed 8/4/22 PET scan- splenic lesion prob benign\par  discoloration on R foot- resolved\par \par

## 2022-09-01 LAB
ALBUMIN SERPL ELPH-MCNC: 4.9 G/DL
ALP BLD-CCNC: 111 U/L
ALT SERPL-CCNC: 175 U/L
AST SERPL-CCNC: 133 U/L
BILIRUB DIRECT SERPL-MCNC: 0.1 MG/DL
BILIRUB INDIRECT SERPL-MCNC: 0.4 MG/DL
BILIRUB SERPL-MCNC: 0.5 MG/DL
ESTIMATED AVERAGE GLUCOSE: 151 MG/DL
HBA1C MFR BLD HPLC: 6.9 %
PROT SERPL-MCNC: 7.3 G/DL

## 2022-09-05 ENCOUNTER — RX RENEWAL (OUTPATIENT)
Age: 52
End: 2022-09-05

## 2022-09-13 ENCOUNTER — APPOINTMENT (OUTPATIENT)
Dept: INTERNAL MEDICINE | Facility: CLINIC | Age: 52
End: 2022-09-13

## 2022-09-13 VITALS
BODY MASS INDEX: 41.41 KG/M2 | SYSTOLIC BLOOD PRESSURE: 110 MMHG | WEIGHT: 225 LBS | TEMPERATURE: 98.7 F | OXYGEN SATURATION: 97 % | HEIGHT: 62 IN | DIASTOLIC BLOOD PRESSURE: 80 MMHG | HEART RATE: 134 BPM

## 2022-09-13 DIAGNOSIS — Z23 ENCOUNTER FOR IMMUNIZATION: ICD-10-CM

## 2022-09-13 PROCEDURE — 99214 OFFICE O/P EST MOD 30 MIN: CPT | Mod: 25

## 2022-09-13 PROCEDURE — G0008: CPT

## 2022-09-13 PROCEDURE — 90682 RIV4 VACC RECOMBINANT DNA IM: CPT

## 2022-09-13 NOTE — HISTORY OF PRESENT ILLNESS
[FreeTextEntry1] : new onset DM [de-identified] :  new DM- DM- new onset- reviewed DM- disease, complications, diet, treatment and its SE,  \par also reviewed hypoglycemia- prevention, treatment and symptoms\par reviewed glucose monitoring .  pre and post meal DM ranges.\par exercise- walking 20 min- daily\par reviewed 8/31/22 non fasting labs- A1c 6.9, elev LFT pt denies any alcohol or herbal supplements.  pt has f/u w hepatology\par low vit D- pt is not taking vit D.\par thyroid  - US 9/10/21.  reviewed 7/14/22 Endo note- reviewed 7/20/22 US thyroid- no chg\par reviewed 7/19/22 Rheum note- -surendraxiang stephie- has psoriatic arthritis-on Consentyx and cymbalta\par pt c/o wgt gain, sweats on cymbalta- a few months ago, pt is using cymbalta 40 mg but pain worse due to rainy weather\par obesity, hot flashes- decr cymbalta- hot flashes was better until today.  it was humdit todaly\par chronic fatigue- sleeping better\par hx of chronic back pain- 80% better since taking cymbalta. seen spine, pain manage ment\par hx of breast CA- being f/u at [facility]- CT abd 5/11/21 sacral mets.  MRI - atypical hemangioma, spondylosis. was in remission for 9 yr then dev mets to sacrum\par reviewed 8/4/22 PET scan- splenic lesion prob benign\par  elev LFT- has appt w hepatologist at [facility] next mo\par \par

## 2022-09-13 NOTE — PLAN
[FreeTextEntry1] : DM- reviewed diet w pt.  pt refused meds.  pt will make dietary chg\par 30   minutes was spent during this encounter preparing for this encounter, evaluating the patient and documenting the above EM service.  This includes counseling, and educating the patient on the disease course and it's treatment/ management. \par flublok vac given\par

## 2022-10-04 RX ORDER — DULOXETINE HYDROCHLORIDE 30 MG/1
30 CAPSULE, DELAYED RELEASE PELLETS ORAL
Qty: 90 | Refills: 0 | Status: DISCONTINUED | COMMUNITY
Start: 2022-06-10 | End: 2022-10-04

## 2022-10-19 ENCOUNTER — APPOINTMENT (OUTPATIENT)
Dept: RHEUMATOLOGY | Facility: CLINIC | Age: 52
End: 2022-10-19

## 2022-10-19 VITALS
HEART RATE: 116 BPM | SYSTOLIC BLOOD PRESSURE: 119 MMHG | RESPIRATION RATE: 18 BRPM | TEMPERATURE: 96.4 F | BODY MASS INDEX: 40.48 KG/M2 | DIASTOLIC BLOOD PRESSURE: 78 MMHG | OXYGEN SATURATION: 98 % | HEIGHT: 62 IN | WEIGHT: 220 LBS

## 2022-10-19 PROCEDURE — 99214 OFFICE O/P EST MOD 30 MIN: CPT | Mod: GC

## 2022-10-20 LAB
ALBUMIN SERPL ELPH-MCNC: 4.9 G/DL
ALP BLD-CCNC: 117 U/L
ALT SERPL-CCNC: 217 U/L
ANION GAP SERPL CALC-SCNC: 13 MMOL/L
AST SERPL-CCNC: 165 U/L
BASOPHILS # BLD AUTO: 0.05 K/UL
BASOPHILS NFR BLD AUTO: 0.8 %
BILIRUB SERPL-MCNC: 0.7 MG/DL
BUN SERPL-MCNC: 13 MG/DL
CALCIUM SERPL-MCNC: 10.2 MG/DL
CHLORIDE SERPL-SCNC: 104 MMOL/L
CK SERPL-CCNC: 83 U/L
CO2 SERPL-SCNC: 29 MMOL/L
CREAT SERPL-MCNC: 0.66 MG/DL
CRP SERPL-MCNC: 7 MG/L
DEPRECATED KAPPA LC FREE/LAMBDA SER: 1.82 RATIO
EGFR: 105 ML/MIN/1.73M2
EOSINOPHIL # BLD AUTO: 0.16 K/UL
EOSINOPHIL NFR BLD AUTO: 2.6 %
ERYTHROCYTE [SEDIMENTATION RATE] IN BLOOD BY WESTERGREN METHOD: 9 MM/HR
GLUCOSE SERPL-MCNC: 151 MG/DL
HCT VFR BLD CALC: 43.2 %
HGB BLD-MCNC: 14 G/DL
IGA SER QL IEP: 39 MG/DL
IGG SER QL IEP: 631 MG/DL
IGM SER QL IEP: 40 MG/DL
IMM GRANULOCYTES NFR BLD AUTO: 0.3 %
KAPPA LC CSF-MCNC: 0.57 MG/DL
KAPPA LC SERPL-MCNC: 1.04 MG/DL
LYMPHOCYTES # BLD AUTO: 2.02 K/UL
LYMPHOCYTES NFR BLD AUTO: 32.3 %
MAN DIFF?: NORMAL
MCHC RBC-ENTMCNC: 28.8 PG
MCHC RBC-ENTMCNC: 32.4 GM/DL
MCV RBC AUTO: 88.9 FL
MONOCYTES # BLD AUTO: 0.51 K/UL
MONOCYTES NFR BLD AUTO: 8.2 %
NEUTROPHILS # BLD AUTO: 3.49 K/UL
NEUTROPHILS NFR BLD AUTO: 55.8 %
PLATELET # BLD AUTO: 249 K/UL
POTASSIUM SERPL-SCNC: 4.4 MMOL/L
PROT SERPL-MCNC: 7 G/DL
RBC # BLD: 4.86 M/UL
RBC # FLD: 12.7 %
SODIUM SERPL-SCNC: 146 MMOL/L
WBC # FLD AUTO: 6.25 K/UL

## 2022-10-26 ENCOUNTER — TRANSCRIPTION ENCOUNTER (OUTPATIENT)
Age: 52
End: 2022-10-26

## 2022-10-26 LAB
COVID-19 SPIKE DOMAIN ANTIBODY INTERPRETATION: POSITIVE
SARS-COV-2 AB SERPL IA-ACNC: >250 U/ML

## 2022-10-28 ENCOUNTER — TRANSCRIPTION ENCOUNTER (OUTPATIENT)
Age: 52
End: 2022-10-28

## 2022-10-31 ENCOUNTER — TRANSCRIPTION ENCOUNTER (OUTPATIENT)
Age: 52
End: 2022-10-31

## 2022-11-01 ENCOUNTER — TRANSCRIPTION ENCOUNTER (OUTPATIENT)
Age: 52
End: 2022-11-01

## 2022-11-22 ENCOUNTER — NON-APPOINTMENT (OUTPATIENT)
Age: 52
End: 2022-11-22

## 2022-11-23 ENCOUNTER — TRANSCRIPTION ENCOUNTER (OUTPATIENT)
Age: 52
End: 2022-11-23

## 2022-11-24 ENCOUNTER — TRANSCRIPTION ENCOUNTER (OUTPATIENT)
Age: 52
End: 2022-11-24

## 2022-11-30 ENCOUNTER — NON-APPOINTMENT (OUTPATIENT)
Age: 52
End: 2022-11-30

## 2022-11-30 ENCOUNTER — APPOINTMENT (OUTPATIENT)
Dept: INTERNAL MEDICINE | Facility: CLINIC | Age: 52
End: 2022-11-30

## 2022-11-30 VITALS
WEIGHT: 223 LBS | HEIGHT: 62 IN | SYSTOLIC BLOOD PRESSURE: 124 MMHG | DIASTOLIC BLOOD PRESSURE: 82 MMHG | OXYGEN SATURATION: 95 % | HEART RATE: 102 BPM | BODY MASS INDEX: 41.04 KG/M2 | TEMPERATURE: 97.6 F

## 2022-11-30 DIAGNOSIS — K21.9 GASTRO-ESOPHAGEAL REFLUX DISEASE W/OUT ESOPHAGITIS: ICD-10-CM

## 2022-11-30 DIAGNOSIS — Z12.83 ENCOUNTER FOR SCREENING FOR MALIGNANT NEOPLASM OF SKIN: ICD-10-CM

## 2022-11-30 LAB
25(OH)D3 SERPL-MCNC: 41.8 NG/ML
ALBUMIN SERPL ELPH-MCNC: 4.8 G/DL
ALP BLD-CCNC: 139 U/L
ALT SERPL-CCNC: 242 U/L
ANION GAP SERPL CALC-SCNC: 18 MMOL/L
AST SERPL-CCNC: 192 U/L
BILIRUB SERPL-MCNC: 0.8 MG/DL
BUN SERPL-MCNC: 11 MG/DL
CALCIUM SERPL-MCNC: 10.4 MG/DL
CHLORIDE SERPL-SCNC: 98 MMOL/L
CHOLEST SERPL-MCNC: 192 MG/DL
CO2 SERPL-SCNC: 25 MMOL/L
CREAT SERPL-MCNC: 0.56 MG/DL
EGFR: 110 ML/MIN/1.73M2
ESTIMATED AVERAGE GLUCOSE: 220 MG/DL
GLUCOSE SERPL-MCNC: 240 MG/DL
HBA1C MFR BLD HPLC: 9.3 %
HDLC SERPL-MCNC: 37 MG/DL
LDLC SERPL CALC-MCNC: 119 MG/DL
LDLC SERPL DIRECT ASSAY-MCNC: 118 MG/DL
NONHDLC SERPL-MCNC: 155 MG/DL
POTASSIUM SERPL-SCNC: 4 MMOL/L
PROT SERPL-MCNC: 7.5 G/DL
SODIUM SERPL-SCNC: 141 MMOL/L
T4 FREE SERPL-MCNC: 1.2 NG/DL
TRANSFERRIN SERPL-MCNC: 273 MG/DL
TRIGL SERPL-MCNC: 180 MG/DL
TSH SERPL-ACNC: 0.9 UIU/ML

## 2022-11-30 PROCEDURE — 93000 ELECTROCARDIOGRAM COMPLETE: CPT

## 2022-11-30 PROCEDURE — 99396 PREV VISIT EST AGE 40-64: CPT | Mod: 25

## 2022-11-30 RX ORDER — DULOXETINE HYDROCHLORIDE 60 MG/1
60 CAPSULE, DELAYED RELEASE PELLETS ORAL
Qty: 30 | Refills: 0 | Status: DISCONTINUED | COMMUNITY
Start: 2022-04-25 | End: 2022-11-30

## 2022-11-30 NOTE — HEALTH RISK ASSESSMENT
[Good] : ~his/her~  mood as  good [Never (0 pts)] : Never (0 points) [No] : In the past 12 months have you used drugs other than those required for medical reasons? No [No falls in past year] : Patient reported no falls in the past year [0] : 2) Feeling down, depressed, or hopeless: Not at all (0) [PHQ-2 Negative - No further assessment needed] : PHQ-2 Negative - No further assessment needed [Patient reported mammogram was normal] : Patient reported mammogram was normal [Patient reported PAP Smear was normal] : Patient reported PAP Smear was normal [Patient reported colonoscopy was normal] : Patient reported colonoscopy was normal [HIV test declined] : HIV test declined [Hepatitis C test declined] : Hepatitis C test declined [None] : None [With Family] : lives with family [Unemployed] : unemployed [Single] : single [# Of Children ___] : has [unfilled] children [Feels Safe at Home] : Feels safe at home [Fully functional (bathing, dressing, toileting, transferring, walking, feeding)] : Fully functional (bathing, dressing, toileting, transferring, walking, feeding) [Fully functional (using the telephone, shopping, preparing meals, housekeeping, doing laundry, using] : Fully functional and needs no help or supervision to perform IADLs (using the telephone, shopping, preparing meals, housekeeping, doing laundry, using transportation, managing medications and managing finances) [Seat Belt] :  uses seat belt [With Patient/Caregiver] : , with patient/caregiver [Designated Healthcare Proxy] : Designated healthcare proxy [Relationship: ___] : Relationship: [unfilled] [Never] : Never [[id number]] : 0 [Change in mental status noted] : No change in mental status noted [Language] : denies difficulty with language [Sexually Active] : not sexually active [High Risk Behavior] : no high risk behavior [Reports changes in hearing] : Reports no changes in hearing [Reports changes in vision] : Reports no changes in vision [Reports changes in dental health] : Reports no changes in dental health [MammogramDate] : 10/21 [MammogramComments] : as per pt .  pt has appt for it.  pt states Oncologist does her breast exam [PapSmearDate] : 11/21 [PapSmearComments] : pt has appt w GYN [ColonoscopyDate] : 12/21 [ColonoscopyComments] : hemorrhoids [de-identified] : last seen ophth 07/2022 [de-identified] : Seen dentist a few mo ago [AdvancecareDate] : 11/22

## 2022-11-30 NOTE — HISTORY OF PRESENT ILLNESS
[FreeTextEntry1] : CPE [de-identified] : vaccine- shingrix vac.  rec COVID booster,- pt states she had pneum vac by Allergy a few yrs ago\par  DM- non compliant w diet recently due to celebration\par also reviewed hypoglycemia symptoms- no symptoms\par reviewed glucose monitoring . - pt didn't get the glucometer\par pt had blurry vision and was seen by Ophtho 1 mo ago- told exam was nl\par exercise- walking 20 min- daily\par reviewed 10/19/22 8/31/22 non fasting labs- A1c 6.9, \par elev LFT pt denies any alcohol or herbal supplements.   pt has f/u w hepatology at [facility]\par low vit D- pt is not taking vit D.\par thyroid  -   reviewed 7/14/22 Endo note- reviewed 7/20/22 US thyroid- no chg\par reviewed 7/19/22 Rheum note- -surendraxiang ferrariseven- has psoriatic arthritis-incr Consentyx dose and decr cymbalta dose\par pt c/o wgt gain, sweats on cymbalta- a few months ago, pt is using cymbalta 40 mg but pain worse due to rainy weather\par obesity, hot flashes- decr cymbalta-\par chronic fatigue- sleeping better.  cymbalta was decr to 20 mg-1 mo ago due to diaphoresis\par hx of chronic back pain- 80% better since taking cymbalta. seen spine, pain manage ment\par hx of breast CA- being f/u at [facility]- CT abd 5/11/21 sacral mets.  MRI - atypical hemangioma, spondylosis. was in remission for 9 yr then dev mets to sacrum. \par reviewed 8/4/22 PET scan- splenic lesion prob benign\par GERD- for 2 wks- 2 / wk. not taken any meds. reviewed 12/7/21 EGD- non erosive gastritis, polyp, reviewed 3/29/22 Elastography- F3\par drinks 1 cup coffee.  no alcohol use\par c/o ears clogged, sinus congestion, HA- brought zyrtec D\par \par

## 2022-11-30 NOTE — PHYSICAL EXAM
[No Acute Distress] : no acute distress [Well Nourished] : well nourished [Well Developed] : well developed [Well-Appearing] : well-appearing [Normal Sclera/Conjunctiva] : normal sclera/conjunctiva [PERRL] : pupils equal round and reactive to light [Normal Outer Ear/Nose] : the outer ears and nose were normal in appearance [Normal TMs] : both tympanic membranes were normal [No Lymphadenopathy] : no lymphadenopathy [Supple] : supple [No Respiratory Distress] : no respiratory distress  [No Accessory Muscle Use] : no accessory muscle use [Clear to Auscultation] : lungs were clear to auscultation bilaterally [Normal Rate] : normal rate  [Regular Rhythm] : with a regular rhythm [Normal S1, S2] : normal S1 and S2 [No Murmur] : no murmur heard [No Carotid Bruits] : no carotid bruits [No Edema] : there was no peripheral edema [Soft] : abdomen soft [Non Tender] : non-tender [Non-distended] : non-distended [No Masses] : no abdominal mass palpated [Normal Bowel Sounds] : normal bowel sounds [Normal Supraclavicular Nodes] : no supraclavicular lymphadenopathy [Normal Axillary Nodes] : no axillary lymphadenopathy [Normal Posterior Cervical Nodes] : no posterior cervical lymphadenopathy [Normal Anterior Cervical Nodes] : no anterior cervical lymphadenopathy [Normal Inguinal Nodes] : no inguinal lymphadenopathy [Grossly Normal Strength/Tone] : grossly normal strength/tone [No Focal Deficits] : no focal deficits [Normal Gait] : normal gait [Normal Affect] : the affect was normal [Normal Insight/Judgement] : insight and judgment were intact [de-identified] : no sinus tenderness [de-identified] : scaly rash on ears, abd, chest

## 2022-12-02 LAB
MITOCHONDRIA AB SER IF-ACNC: NORMAL
SMOOTH MUSCLE AB SER QL IF: NORMAL

## 2022-12-05 LAB — ANA SER IF-ACNC: NEGATIVE

## 2022-12-08 ENCOUNTER — TRANSCRIPTION ENCOUNTER (OUTPATIENT)
Age: 52
End: 2022-12-08

## 2022-12-09 ENCOUNTER — TRANSCRIPTION ENCOUNTER (OUTPATIENT)
Age: 52
End: 2022-12-09

## 2022-12-21 ENCOUNTER — TRANSCRIPTION ENCOUNTER (OUTPATIENT)
Age: 52
End: 2022-12-21

## 2022-12-21 ENCOUNTER — APPOINTMENT (OUTPATIENT)
Dept: OBGYN | Facility: CLINIC | Age: 52
End: 2022-12-21

## 2022-12-21 VITALS
HEIGHT: 62 IN | HEART RATE: 105 BPM | SYSTOLIC BLOOD PRESSURE: 139 MMHG | WEIGHT: 222 LBS | BODY MASS INDEX: 40.85 KG/M2 | DIASTOLIC BLOOD PRESSURE: 86 MMHG

## 2022-12-21 DIAGNOSIS — N76.0 ACUTE VAGINITIS: ICD-10-CM

## 2022-12-21 PROCEDURE — 99396 PREV VISIT EST AGE 40-64: CPT

## 2022-12-21 NOTE — PHYSICAL EXAM
[Chaperone Present] : A chaperone was present in the examining room during all aspects of the physical examination [Appropriately responsive] : appropriately responsive [Alert] : alert [No Acute Distress] : no acute distress [No Lymphadenopathy] : no lymphadenopathy [Soft] : soft [Non-tender] : non-tender [Non-distended] : non-distended [No HSM] : No HSM [No Lesions] : no lesions [No Mass] : no mass [Oriented x3] : oriented x3 [Examination Of The Breasts] : a normal appearance [Right Breast Absent] : a total mastectomy [Breast Reconstruction Right] : breast reconstruction [No Masses] : no breast masses were palpable [Labia Majora] : normal [Labia Minora] : normal [Normal] : normal [Uterine Adnexae] : absent

## 2022-12-22 LAB — HPV HIGH+LOW RISK DNA PNL CVX: NOT DETECTED

## 2022-12-22 NOTE — HISTORY OF PRESENT ILLNESS
[TextBox_4] : 2014 BSO.Rt mastectomy\par onc at [facility]\par 83 yo dad pancr CA --TO/JONELLE [Mammogramdate] : 2021 [BreastSonogramDate] : 2021 [PapSmeardate] : 2021 [BoneDensityDate] : 2020 [ColonoscopyDate] : 2021

## 2022-12-23 ENCOUNTER — TRANSCRIPTION ENCOUNTER (OUTPATIENT)
Age: 52
End: 2022-12-23

## 2022-12-23 LAB
CANDIDA VAG CYTO: NOT DETECTED
G VAGINALIS+PREV SP MTYP VAG QL MICRO: NOT DETECTED
T VAGINALIS VAG QL WET PREP: NOT DETECTED

## 2022-12-27 ENCOUNTER — NON-APPOINTMENT (OUTPATIENT)
Age: 52
End: 2022-12-27

## 2022-12-29 ENCOUNTER — RX RENEWAL (OUTPATIENT)
Age: 52
End: 2022-12-29

## 2022-12-30 ENCOUNTER — RX RENEWAL (OUTPATIENT)
Age: 52
End: 2022-12-30

## 2023-01-04 ENCOUNTER — APPOINTMENT (OUTPATIENT)
Dept: ENDOCRINOLOGY | Facility: CLINIC | Age: 53
End: 2023-01-04
Payer: MEDICAID

## 2023-01-04 VITALS
HEART RATE: 101 BPM | TEMPERATURE: 98 F | DIASTOLIC BLOOD PRESSURE: 64 MMHG | BODY MASS INDEX: 40.85 KG/M2 | OXYGEN SATURATION: 98 % | HEIGHT: 62 IN | SYSTOLIC BLOOD PRESSURE: 93 MMHG | WEIGHT: 222 LBS

## 2023-01-04 DIAGNOSIS — Z86.39 PERSONAL HISTORY OF OTHER ENDOCRINE, NUTRITIONAL AND METABOLIC DISEASE: ICD-10-CM

## 2023-01-04 LAB — GLUCOSE BLDC GLUCOMTR-MCNC: 212

## 2023-01-04 PROCEDURE — 95249 CONT GLUC MNTR PT PROV EQP: CPT

## 2023-01-04 PROCEDURE — G0108 DIAB MANAGE TRN  PER INDIV: CPT

## 2023-01-04 PROCEDURE — 99214 OFFICE O/P EST MOD 30 MIN: CPT | Mod: 25

## 2023-01-04 RX ORDER — DULAGLUTIDE 0.75 MG/.5ML
0.75 INJECTION, SOLUTION SUBCUTANEOUS
Qty: 4 | Refills: 0 | Status: COMPLETED | COMMUNITY
Start: 2023-01-04 | End: 2023-02-01

## 2023-01-04 RX ORDER — CLINDAMYCIN HYDROCHLORIDE 300 MG/1
300 CAPSULE ORAL
Qty: 2 | Refills: 0 | Status: COMPLETED | COMMUNITY
Start: 2022-05-27 | End: 2023-01-04

## 2023-01-04 RX ORDER — EMPAGLIFLOZIN 25 MG/1
25 TABLET, FILM COATED ORAL
Qty: 90 | Refills: 0 | Status: COMPLETED | COMMUNITY
Start: 2022-12-08 | End: 2023-01-04

## 2023-01-05 ENCOUNTER — NON-APPOINTMENT (OUTPATIENT)
Age: 53
End: 2023-01-05

## 2023-01-05 PROBLEM — Z86.39 HISTORY OF THYROID NODULE: Status: RESOLVED | Noted: 2022-07-14 | Resolved: 2023-01-05

## 2023-01-05 LAB — CYTOLOGY CVX/VAG DOC THIN PREP: ABNORMAL

## 2023-01-05 NOTE — ASSESSMENT
[FreeTextEntry1] : This is a 52-year-old female with type 2 diabetes mellitus, thyroid calcification, with a history of breast cancer status post right mastectomy, hepatic cyst, splenic lesions, anemia, psoriatic arthritis, fibromyalgia, here for follow-up.\par 1. Type 2 diabetes \par Diabetes was diagnosed in August 2022 when HgbA1c was found to be 6.9%. Most recent A1c from November 2022 found to be 9.3%. She is not currently taking any diabetes medications. She was prescribed Jardiance but stated insurance does not cover it. \par Last ophthalmology exam was in summer 2022. Denies retinopathy. Denies nephropathy. Reports local numbness. No known neuropathy. She is not on a statin. She is not on an Ace inhibitor or ARB. She self monitors blood glucose levels twice a day. Reports fasting levels 150-200s and after dinner levels 120-200s.\par Treatment options for diabetes reviewed.\par Avoid metformin given elevated transaminase levels.\par Will start Trulicity given potential benefit of weight loss. \par GI side affects reviewed. No history of pancreatitis. No personal or family history of thyroid cancer.\par She has a freestyle bandar CGM.\par She has an appointment with CDE today for education. \par 2. Thyroid calcification\par Thyroid ultrasound from July 2022 showed Coarse calcification identified within the lower pole of the left lobe not significantly changed from previous exam. No other cystic or solid masses or calcifications were seen. \par I spoke with radiologist Dr. Matias in July 2022 and reports it looks benign. No further ultrasound needed.

## 2023-01-05 NOTE — ADDENDUM
[FreeTextEntry1] : By signing my name below, I, Dakota Ramesh, attest that this document has been prepared under the direction and in the presence of Dr. Silvestre.\par \par I, Troy Silvestre MD, personally performed the services described in this documentation. All medical record entries made by the scribe were at my discretion and in my presence. I have reviewed the chart and discharge instructions (if applicable) and agree that the record reflects my personal permanence and is accurate and complete.\par

## 2023-01-05 NOTE — PHYSICAL EXAM
[Alert] : alert [Well Nourished] : well nourished [Healthy Appearance] : healthy appearance [No Acute Distress] : no acute distress [Well Developed] : well developed [Normal Sclera/Conjunctiva] : normal sclera/conjunctiva [No Proptosis] : no proptosis [No Neck Mass] : no neck mass was observed [No LAD] : no lymphadenopathy [Supple] : the neck was supple [Thyroid Not Enlarged] : the thyroid was not enlarged [No Thyroid Nodules] : no palpable thyroid nodules [No Respiratory Distress] : no respiratory distress [No Stigmata of Cushings Syndrome] : no stigmata of Cushings Syndrome [Normal Affect] : the affect was normal [Normal Insight/Judgement] : insight and judgment were intact [Right foot was examined, including] : right foot ~C was examined, including visual inspection with sensory and pulse exams [Left foot was examined, including] : left foot ~C was examined, including visual inspection with sensory and pulse exams [Normal] : normal [2+] : 2+ in the dorsalis pedis [Normal Sensation on Monofilament Testing] : normal sensation on monofilament testing of lower extremities [Obese] : obese [Normal Voice/Communication] : normal voice communication [No Edema] : no peripheral edema [Pedal Pulses Normal] : the pedal pulses are present [Diminished Throughout Both Feet] : normal tactile sensation with monofilament testing throughout both feet [Normal Mood] : the mood was normal

## 2023-01-05 NOTE — HISTORY OF PRESENT ILLNESS
[FreeTextEntry1] : CC: Thyroid follow-up\par This is a 52-year-old female with type 2 diabetes mellitus, thyroid calcification, with a history of breast cancer status post right mastectomy, hepatic cyst, splenic lesions, anemia, psoriatic arthritis, fibromyalgia, here for follow-up.\par Diabetes was diagnosed in August 2022 when HgbA1c was found to be 6.9%. Most recent A1c from November 2022 found to be 9.3%. She is not currently taking any diabetes medications. She was prescribed Jardiance but stated insurance does not cover it. \par Last ophthalmology exam was in summer 2022. Denies retinopathy. Denies nephropathy. Reports local numbness. No known neuropathy. She is not on a statin. She is not on an Ace inhibitor or ARB. She self monitors blood glucose levels twice a day. Reports fasting levels 150-200s and after dinner levels 120-200s.\par \par CT chest from August 2021 showed unchanged subcentimeter calcified left thyroid nodule. Thyroid ultrasound from December 27, 2021 showed left 0.9 x 0.6 x 0.3 cm coarse calcification in the lower lobe, unchanged compared to prior.\par No history of radiation therapy to the head or neck.\par No family history of thyroid cancer.\par No obstructive symptoms.\par Thyroid antibodies are negative. \par \par \par

## 2023-01-10 ENCOUNTER — NON-APPOINTMENT (OUTPATIENT)
Age: 53
End: 2023-01-10

## 2023-01-10 ENCOUNTER — TRANSCRIPTION ENCOUNTER (OUTPATIENT)
Age: 53
End: 2023-01-10

## 2023-01-12 ENCOUNTER — TRANSCRIPTION ENCOUNTER (OUTPATIENT)
Age: 53
End: 2023-01-12

## 2023-01-12 ENCOUNTER — APPOINTMENT (OUTPATIENT)
Dept: ENDOCRINOLOGY | Facility: CLINIC | Age: 53
End: 2023-01-12

## 2023-01-13 ENCOUNTER — TRANSCRIPTION ENCOUNTER (OUTPATIENT)
Age: 53
End: 2023-01-13

## 2023-01-13 ENCOUNTER — APPOINTMENT (OUTPATIENT)
Dept: ENDOCRINOLOGY | Facility: CLINIC | Age: 53
End: 2023-01-13
Payer: MEDICAID

## 2023-01-13 PROCEDURE — G0108 DIAB MANAGE TRN  PER INDIV: CPT

## 2023-01-18 ENCOUNTER — NON-APPOINTMENT (OUTPATIENT)
Age: 53
End: 2023-01-18

## 2023-01-18 ENCOUNTER — APPOINTMENT (OUTPATIENT)
Dept: RHEUMATOLOGY | Facility: CLINIC | Age: 53
End: 2023-01-18
Payer: MEDICAID

## 2023-01-18 VITALS — OXYGEN SATURATION: 97 % | HEART RATE: 110 BPM | BODY MASS INDEX: 40.48 KG/M2 | HEIGHT: 62 IN | WEIGHT: 220 LBS

## 2023-01-18 PROCEDURE — 99214 OFFICE O/P EST MOD 30 MIN: CPT

## 2023-01-18 NOTE — ASSESSMENT
[FreeTextEntry1] : 52 year old female with psoriasis and PsA\par \par 1. PsA: No axial involvement per imaging but has active skin disease and erosive arthritis. much improved on Cosentyx but still with persistent skin and joint/tendon symptoms. Possible bursitis, tenosynovitis today on exam but comorbid FM possibly confounding.No improvement with Cosentyx 300mg/month thus discussed changing therapy to an [id number] inhibitor or IL17A inhibitor but patient want to wait till her other medications are adjusted. Advised to call me back in 1 month to discuss. \par \par Previously on Stelara which did not improve skin/joint disease. Did not respond adequately to Otezla in the past either. Anti-TNF and Tofacitinib are not attractive choices given h/o metastatic breast cancer in the recent past, although she has been LAYLA for at least 5 years. \par \par 2. Lower back pain- likely musculoskeletal; no axial involvement of PsA on MRI imaging within past year. \par Can take diclofenac PRN (not currently taking), and tylenol. \par \par 3. Abnormal LFTs: f/u hepatology. Consider liver biopsy. \par \par Follow up in 2 -3 months

## 2023-01-18 NOTE — PHYSICAL EXAM
[General Appearance - Alert] : alert [General Appearance - In No Acute Distress] : in no acute distress [General Appearance - Well Nourished] : well nourished [General Appearance - Well Developed] : well developed [Sclera] : the sclera and conjunctiva were normal [PERRL With Normal Accommodation] : pupils were equal in size, round, and reactive to light [Examination Of The Oral Cavity] : the lips and gums were normal [Oropharynx] : the oropharynx was normal [] : no respiratory distress [Respiration, Rhythm And Depth] : normal respiratory rhythm and effort [Auscultation Breath Sounds / Voice Sounds] : lungs were clear to auscultation bilaterally [Heart Rate And Rhythm] : heart rate was normal and rhythm regular [Heart Sounds] : normal S1 and S2 [Edema] : there was no peripheral edema [Bowel Sounds] : normal bowel sounds [Abdomen Soft] : soft [FreeTextEntry1] : Active psoriatic plaque on the lower back <5% BSA involved

## 2023-01-18 NOTE — HISTORY OF PRESENT ILLNESS
[___ Month(s) Ago] : [unfilled] month(s) ago [FreeTextEntry1] : Continues to have severe pain and stiffness. Using a cane today. Has had some adjustments made to her diabetes medication and also considering a liver biopsy.

## 2023-01-19 ENCOUNTER — APPOINTMENT (OUTPATIENT)
Dept: ENDOCRINOLOGY | Facility: CLINIC | Age: 53
End: 2023-01-19
Payer: MEDICAID

## 2023-01-19 ENCOUNTER — RX RENEWAL (OUTPATIENT)
Age: 53
End: 2023-01-19

## 2023-01-19 ENCOUNTER — TRANSCRIPTION ENCOUNTER (OUTPATIENT)
Age: 53
End: 2023-01-19

## 2023-01-19 LAB
ALBUMIN SERPL ELPH-MCNC: 4.9 G/DL
ALP BLD-CCNC: 118 U/L
ALT SERPL-CCNC: 189 U/L
ANION GAP SERPL CALC-SCNC: 13 MMOL/L
AST SERPL-CCNC: 124 U/L
BASOPHILS # BLD AUTO: 0.06 K/UL
BASOPHILS NFR BLD AUTO: 1 %
BILIRUB SERPL-MCNC: 0.6 MG/DL
BUN SERPL-MCNC: 16 MG/DL
CALCIUM SERPL-MCNC: 10.4 MG/DL
CHLORIDE SERPL-SCNC: 101 MMOL/L
CO2 SERPL-SCNC: 26 MMOL/L
CREAT SERPL-MCNC: 0.78 MG/DL
CRP SERPL-MCNC: 7 MG/L
EGFR: 91 ML/MIN/1.73M2
EOSINOPHIL # BLD AUTO: 0.14 K/UL
EOSINOPHIL NFR BLD AUTO: 2.4 %
ERYTHROCYTE [SEDIMENTATION RATE] IN BLOOD BY WESTERGREN METHOD: 17 MM/HR
GLUCOSE SERPL-MCNC: 237 MG/DL
HCT VFR BLD CALC: 44.6 %
HGB BLD-MCNC: 14.3 G/DL
IMM GRANULOCYTES NFR BLD AUTO: 0.3 %
LYMPHOCYTES # BLD AUTO: 1.94 K/UL
LYMPHOCYTES NFR BLD AUTO: 33 %
MAN DIFF?: NORMAL
MCHC RBC-ENTMCNC: 29.4 PG
MCHC RBC-ENTMCNC: 32.1 GM/DL
MCV RBC AUTO: 91.8 FL
MONOCYTES # BLD AUTO: 0.46 K/UL
MONOCYTES NFR BLD AUTO: 7.8 %
NEUTROPHILS # BLD AUTO: 3.25 K/UL
NEUTROPHILS NFR BLD AUTO: 55.5 %
PLATELET # BLD AUTO: 237 K/UL
POTASSIUM SERPL-SCNC: 4.6 MMOL/L
PROT SERPL-MCNC: 6.8 G/DL
RBC # BLD: 4.86 M/UL
RBC # FLD: 12.5 %
SODIUM SERPL-SCNC: 140 MMOL/L
WBC # FLD AUTO: 5.87 K/UL

## 2023-01-19 PROCEDURE — 95251 CONT GLUC MNTR ANALYSIS I&R: CPT

## 2023-01-20 ENCOUNTER — TRANSCRIPTION ENCOUNTER (OUTPATIENT)
Age: 53
End: 2023-01-20

## 2023-01-25 ENCOUNTER — RX RENEWAL (OUTPATIENT)
Age: 53
End: 2023-01-25

## 2023-01-26 ENCOUNTER — NON-APPOINTMENT (OUTPATIENT)
Age: 53
End: 2023-01-26

## 2023-01-30 ENCOUNTER — APPOINTMENT (OUTPATIENT)
Dept: OTOLARYNGOLOGY | Facility: CLINIC | Age: 53
End: 2023-01-30
Payer: MEDICAID

## 2023-01-30 VITALS
HEART RATE: 118 BPM | WEIGHT: 220 LBS | DIASTOLIC BLOOD PRESSURE: 102 MMHG | BODY MASS INDEX: 40.48 KG/M2 | SYSTOLIC BLOOD PRESSURE: 151 MMHG | TEMPERATURE: 98.8 F | HEIGHT: 62 IN

## 2023-01-30 DIAGNOSIS — H93.13 TINNITUS, BILATERAL: ICD-10-CM

## 2023-01-30 PROCEDURE — 99204 OFFICE O/P NEW MOD 45 MIN: CPT | Mod: 25

## 2023-01-30 PROCEDURE — 31231 NASAL ENDOSCOPY DX: CPT

## 2023-01-30 NOTE — ASSESSMENT
[FreeTextEntry1] : 52 year old with history of breast ca with mets to the bone, now in remission since 2009.\par Presents for evaluation of sphenoid sinus pressure x 1 week and nasal dryness. Pt likely with sinus infection.\par We will proceed with a regiment of decongestants, antibiotics and irrigation to try to break the cycle of inflation and obstruction. this will treat the acute symptoms and will hopefully allow for maintenance therapy to reach disease areas and avoid further intervention.\par - Will start Flonase. A topical steroid reduce mucosal swelling, illustrated appropriate use and how to reduce the risk of bleeding \par -will first try nasal sprays to see if this helps with sinus sx, if sx persist pt can start sinusitis regimen \par \par Pt also with b/l ear congestion and tinnitus . Ear exam begin. \par ear hygiene\par discussed preventive measures and signs of accumulation\par \par \par

## 2023-01-30 NOTE — PROCEDURE
[FreeTextEntry6] : Procedure performed: Nasal Endoscopy- Diagnostic\par Pre-op indication(s): nasal congestion\par Post-op indication(s): nasal congestion \par Verbal and/or written consent obtained from patient\par Anterior rhinoscopy insufficient to account for symptoms\par Scope #: 3,  flexible fiber optic telescope \par The scope was introduced in the nasal passage between the middle and inferior turbinates to exam the inferior portion of the middle meatus and the fontanelle, as well as the maxillary ostia.  Next, the scope was passed medically and posteriorly to the middle turbinates to examine the sphenoethmoid recess and the superior turbinate region.\par Upon visualization the finders are as follows:\par Nasal Septum: sigmoidal septal deviation\par Bilateral - Mucosa: boggy turbinates, Mucous: scant, Polyp: not seen, Inferior Turbinate: boggy, Middle Turbinate: normal, Superior Turbinate: normal, Inferior Meatus: narrow, Middle Meatus: narrow, Super Meatus:normal, Sphenoethmoidal Recess: clear\par no purulence seen on exam

## 2023-01-30 NOTE — CONSULT LETTER
[Please see my note below.] : Please see my note below. [FreeTextEntry1] : Dear Dr. KENDELL SOOD \par I had the pleasure of evaluating your patient DANIELLA CALVIN, thank you for allowing us to participate in their care. please see full note detailing our visit below.\par If you have any questions, please do not hesitate to call me and I would be happy to discuss further. \par \par Rudy Butcher M.D.\par Attending Physician,  \par Department of Otolaryngology - Head and Neck Surgery\par [facility] \par Office: [phone]\par Fax: [phone]\par \par

## 2023-01-30 NOTE — HISTORY OF PRESENT ILLNESS
[de-identified] : 52 year old with history of breast ca with mets to the bone, now in remission since 2009.\par Presents for evaluation of sinus pressure x 1 week. \par Reports pressure behind the eyes, nasal dryness and bilateral ear congestion. \par Nasal congestion and mucus when she wakes up in the morning. Taking Zyrtec D.\par Now with tinnitus which began today, describes as a buzzing sound bilaterally.

## 2023-01-31 ENCOUNTER — NON-APPOINTMENT (OUTPATIENT)
Age: 53
End: 2023-01-31

## 2023-02-01 ENCOUNTER — APPOINTMENT (OUTPATIENT)
Dept: INTERNAL MEDICINE | Facility: CLINIC | Age: 53
End: 2023-02-01
Payer: MEDICAID

## 2023-02-01 ENCOUNTER — TRANSCRIPTION ENCOUNTER (OUTPATIENT)
Age: 53
End: 2023-02-01

## 2023-02-01 VITALS
BODY MASS INDEX: 39.69 KG/M2 | SYSTOLIC BLOOD PRESSURE: 140 MMHG | OXYGEN SATURATION: 98 % | HEART RATE: 104 BPM | WEIGHT: 217 LBS | DIASTOLIC BLOOD PRESSURE: 90 MMHG

## 2023-02-01 DIAGNOSIS — R53.83 OTHER FATIGUE: ICD-10-CM

## 2023-02-01 DIAGNOSIS — R42 DIZZINESS AND GIDDINESS: ICD-10-CM

## 2023-02-01 PROCEDURE — 99214 OFFICE O/P EST MOD 30 MIN: CPT | Mod: 25

## 2023-02-01 PROCEDURE — G0444 DEPRESSION SCREEN ANNUAL: CPT | Mod: 59

## 2023-02-01 NOTE — PLAN
[FreeTextEntry1] : HIGH BP\par -TAKE BP AT HOME AND COME BACK 1 WK\par 1/4/2023 BP WAS 93/64\par STRESS\par - PSCY TH\par - INCASE IT GETS WORSE PLEASE GO TO ER\par Depression screening WITH the Patient Health Questionnaire-2 (PHQ-2), THE RESULT WAS NEGATIVE.\par Preventive medicine discussed - including importance of lifestyle modification - \par weight loss counseling provided as above \par The benefits of adequate calcium intake and routine daily cardiovascular exercise were reviewed with the patient.\par F/U 1 MONTH IF NECK PAIN DOESN'T GET BETTER NOT BETTER\par -Follow up in 11/2023 year for annual exam or PRN.\par All questions and concerns were discussed. \par 34 MINUTES SPENT BY THE PROVIDER, INCLUSIVE OF ALL ISSUES RELATED TO THIS ENCOUNTER ON THE DATE OF SERVICE.

## 2023-02-01 NOTE — HISTORY OF PRESENT ILLNESS
[FreeTextEntry1] : HERE HEAD PRESSURE  3 DAYS\par LIGHT HEADINESS 3 DAYS\par EXTRA STRESS 2 WKS - FATHER WAS DX WITH PANCREATIC CA\par  [de-identified] : SINUS INFECTION FOR 2 WKS WENT TO ENT \par WAS TREATMENT WITH ANTIBIOTICS AND STEROIDS\par WENT TO 2ND ENT 2/1/2023 WHO TOLD HER SHE DOESN'T\par  HAVE SINUS INFECT BUT HAS HIGH BP\par H/O BREAST CA 2009

## 2023-02-01 NOTE — REVIEW OF SYSTEMS
[Dizziness] : dizziness [Fainting] : no fainting [Confusion] : no confusion [Negative] : Genitourinary

## 2023-02-01 NOTE — HEALTH RISK ASSESSMENT
[0] : 2) Feeling down, depressed, or hopeless: Not at all (0) [PHQ-2 Negative - No further assessment needed] : PHQ-2 Negative - No further assessment needed [I have developed a follow-up plan documented below in the note.] : I have developed a follow-up plan documented below in the note. [[id number]] : 0

## 2023-02-02 ENCOUNTER — TRANSCRIPTION ENCOUNTER (OUTPATIENT)
Age: 53
End: 2023-02-02

## 2023-02-02 ENCOUNTER — NON-APPOINTMENT (OUTPATIENT)
Age: 53
End: 2023-02-02

## 2023-02-02 LAB
25(OH)D3 SERPL-MCNC: 45.8 NG/ML
ALBUMIN SERPL ELPH-MCNC: 4.9 G/DL
ALP BLD-CCNC: 118 U/L
ALT SERPL-CCNC: 190 U/L
ANION GAP SERPL CALC-SCNC: 15 MMOL/L
APPEARANCE: CLEAR
AST SERPL-CCNC: 123 U/L
BASOPHILS # BLD AUTO: 0.05 K/UL
BASOPHILS NFR BLD AUTO: 0.6 %
BILIRUB SERPL-MCNC: 0.7 MG/DL
BILIRUBIN URINE: NEGATIVE
BLOOD URINE: NEGATIVE
BUN SERPL-MCNC: 18 MG/DL
CALCIUM SERPL-MCNC: 10.2 MG/DL
CHLORIDE SERPL-SCNC: 101 MMOL/L
CHOLEST SERPL-MCNC: 201 MG/DL
CO2 SERPL-SCNC: 22 MMOL/L
COLOR: NORMAL
CREAT SERPL-MCNC: 0.67 MG/DL
EGFR: 105 ML/MIN/1.73M2
EOSINOPHIL # BLD AUTO: 0.01 K/UL
EOSINOPHIL NFR BLD AUTO: 0.1 %
ESTIMATED AVERAGE GLUCOSE: 197 MG/DL
GLUCOSE QUALITATIVE U: ABNORMAL
GLUCOSE SERPL-MCNC: 467 MG/DL
HBA1C MFR BLD HPLC: 8.5 %
HCT VFR BLD CALC: 44.4 %
HDLC SERPL-MCNC: 40 MG/DL
HGB BLD-MCNC: 14.5 G/DL
IMM GRANULOCYTES NFR BLD AUTO: 0.2 %
KETONES URINE: NEGATIVE
LDLC SERPL CALC-MCNC: 134 MG/DL
LEUKOCYTE ESTERASE URINE: NEGATIVE
LYMPHOCYTES # BLD AUTO: 1.08 K/UL
LYMPHOCYTES NFR BLD AUTO: 12.7 %
MAN DIFF?: NORMAL
MCHC RBC-ENTMCNC: 29.5 PG
MCHC RBC-ENTMCNC: 32.7 GM/DL
MCV RBC AUTO: 90.2 FL
MONOCYTES # BLD AUTO: 0.18 K/UL
MONOCYTES NFR BLD AUTO: 2.1 %
NEUTROPHILS # BLD AUTO: 7.18 K/UL
NEUTROPHILS NFR BLD AUTO: 84.3 %
NITRITE URINE: NEGATIVE
NONHDLC SERPL-MCNC: 161 MG/DL
PH URINE: 6
PLATELET # BLD AUTO: 253 K/UL
POTASSIUM SERPL-SCNC: 4.5 MMOL/L
PROT SERPL-MCNC: 7.5 G/DL
PROTEIN URINE: NEGATIVE
RBC # BLD: 4.92 M/UL
RBC # FLD: 12 %
SODIUM SERPL-SCNC: 138 MMOL/L
SPECIFIC GRAVITY URINE: 1.03
TRIGL SERPL-MCNC: 132 MG/DL
TSH SERPL-ACNC: 0.48 UIU/ML
UROBILINOGEN URINE: NORMAL
WBC # FLD AUTO: 8.52 K/UL

## 2023-02-03 ENCOUNTER — NON-APPOINTMENT (OUTPATIENT)
Age: 53
End: 2023-02-03

## 2023-02-03 ENCOUNTER — APPOINTMENT (OUTPATIENT)
Dept: OPHTHALMOLOGY | Facility: CLINIC | Age: 53
End: 2023-02-03
Payer: COMMERCIAL

## 2023-02-03 ENCOUNTER — APPOINTMENT (OUTPATIENT)
Dept: INTERNAL MEDICINE | Facility: CLINIC | Age: 53
End: 2023-02-03
Payer: MEDICAID

## 2023-02-03 VITALS
SYSTOLIC BLOOD PRESSURE: 128 MMHG | DIASTOLIC BLOOD PRESSURE: 80 MMHG | HEART RATE: 103 BPM | OXYGEN SATURATION: 98 % | WEIGHT: 217 LBS | BODY MASS INDEX: 39.69 KG/M2

## 2023-02-03 VITALS — SYSTOLIC BLOOD PRESSURE: 120 MMHG | DIASTOLIC BLOOD PRESSURE: 80 MMHG

## 2023-02-03 DIAGNOSIS — E11.9 TYPE 2 DIABETES MELLITUS W/OUT COMPLICATIONS: ICD-10-CM

## 2023-02-03 DIAGNOSIS — R51.9 HEADACHE, UNSPECIFIED: ICD-10-CM

## 2023-02-03 PROCEDURE — 92012 INTRM OPH EXAM EST PATIENT: CPT

## 2023-02-03 PROCEDURE — 99214 OFFICE O/P EST MOD 30 MIN: CPT

## 2023-02-03 NOTE — PLAN
[FreeTextEntry1] : elev chol- will start statin once cleared by Hepatologist\par sinus pressure, new HA- \par DM- not controlled but recently started ozempic

## 2023-02-03 NOTE — HISTORY OF PRESENT ILLNESS
[FreeTextEntry8] : CC: headache\par c/o pressure on top of head- 1 wk- head, sinus congestion congestion- 2 wk, buzzing.  better in evening and w moving\par duration all day. no vision chg, slurred speech. \par when turning her head, feels like her "eye is a shutter"\par reviewed 2/1/23 ENT- had elev BP - was seen in our office 2/1/23 - elev BP.  was tx w steroeds for sinusitis 1/1-1/2/23\par reviewed 2/1/23 labs- , elev AST, ALT, A1c 8.5\par reviewed 2/3/23 OPhth notes- unremarkable exam\par  [de-identified] : \par  DM- compliant w diet\par also reviewed hypoglycemia symptoms- no symptoms\par has bandar- fasting glucose 187-200\par reviewed 2/3/23 Endo notes- started ozempic 1 wk ago\par elev LFT pt denies any alcohol or herbal supplements.   pt has f/u w hepatology at [facility]\par low vit D- pt is not taking vit D.\par thyroid  -   reviewed 7/14/22 Endo note- reviewed 7/20/22 US thyroid- no chg\par reviewed  1/18/23 Rheum note- -diane ferrarits- has psoriatic arthritis- no chg in meds. \par pt c/o wgt gain, sweats on cymbalta- a few months ago, pt is using cymbalta 40 mg but pain worse due to rainy weather\par chronic fatigue- sleeping better.  cymbalta was decr to 20 mg- due to diaphoresis\par hx of chronic back pain- 80% better since taking cymbalta. seen spine, pain manage ment\par hx of breast CA- being f/u at [facility] [place]- CT abd 5/11/21 sacral mets.  MRI - atypical hemangioma, spondylosis. was in remission for 9 yr then dev mets to sacrum. \par reviewed 8/4/22 PET scan- splenic lesion prob benign\par GERD-- 2 / wk. not taken any meds. reviewed 12/7/21 EGD- non erosive gastritis, polyp, reviewed 3/29/22 Elastography- F3\par \par

## 2023-02-03 NOTE — PHYSICAL EXAM
[PERRL] : pupils equal round and reactive to light [EOMI] : extraocular movements intact [Normal Oropharynx] : the oropharynx was normal [de-identified] : MS 5/5 UExt, LExt [de-identified] : CN 2-12nl , finger- nose nl.

## 2023-02-06 ENCOUNTER — OUTPATIENT (OUTPATIENT)
Dept: OUTPATIENT SERVICES | Facility: HOSPITAL | Age: 53
LOS: 1 days | End: 2023-02-06
Payer: MEDICAID

## 2023-02-06 ENCOUNTER — APPOINTMENT (OUTPATIENT)
Dept: CT IMAGING | Facility: CLINIC | Age: 53
End: 2023-02-06
Payer: MEDICAID

## 2023-02-06 DIAGNOSIS — Z00.8 ENCOUNTER FOR OTHER GENERAL EXAMINATION: ICD-10-CM

## 2023-02-06 DIAGNOSIS — Z90.722 ACQUIRED ABSENCE OF OVARIES, BILATERAL: Chronic | ICD-10-CM

## 2023-02-06 DIAGNOSIS — Z90.10 ACQUIRED ABSENCE OF UNSPECIFIED BREAST AND NIPPLE: Chronic | ICD-10-CM

## 2023-02-06 DIAGNOSIS — J34.89 OTHER SPECIFIED DISORDERS OF NOSE AND NASAL SINUSES: ICD-10-CM

## 2023-02-06 PROCEDURE — 70486 CT MAXILLOFACIAL W/O DYE: CPT | Mod: 26

## 2023-02-06 PROCEDURE — 70486 CT MAXILLOFACIAL W/O DYE: CPT

## 2023-02-07 ENCOUNTER — NON-APPOINTMENT (OUTPATIENT)
Age: 53
End: 2023-02-07

## 2023-02-08 ENCOUNTER — APPOINTMENT (OUTPATIENT)
Dept: INTERNAL MEDICINE | Facility: CLINIC | Age: 53
End: 2023-02-08

## 2023-02-08 ENCOUNTER — NON-APPOINTMENT (OUTPATIENT)
Age: 53
End: 2023-02-08

## 2023-02-10 ENCOUNTER — TRANSCRIPTION ENCOUNTER (OUTPATIENT)
Age: 53
End: 2023-02-10

## 2023-02-13 ENCOUNTER — TRANSCRIPTION ENCOUNTER (OUTPATIENT)
Age: 53
End: 2023-02-13

## 2023-03-06 ENCOUNTER — APPOINTMENT (OUTPATIENT)
Dept: OPHTHALMOLOGY | Facility: CLINIC | Age: 53
End: 2023-03-06

## 2023-03-06 ENCOUNTER — APPOINTMENT (OUTPATIENT)
Dept: INTERNAL MEDICINE | Facility: CLINIC | Age: 53
End: 2023-03-06

## 2023-03-13 ENCOUNTER — TRANSCRIPTION ENCOUNTER (OUTPATIENT)
Age: 53
End: 2023-03-13

## 2023-03-14 ENCOUNTER — TRANSCRIPTION ENCOUNTER (OUTPATIENT)
Age: 53
End: 2023-03-14

## 2023-03-15 ENCOUNTER — TRANSCRIPTION ENCOUNTER (OUTPATIENT)
Age: 53
End: 2023-03-15

## 2023-03-17 ENCOUNTER — APPOINTMENT (OUTPATIENT)
Dept: OPHTHALMOLOGY | Facility: CLINIC | Age: 53
End: 2023-03-17

## 2023-03-21 ENCOUNTER — APPOINTMENT (OUTPATIENT)
Dept: ORTHOPEDIC SURGERY | Facility: CLINIC | Age: 53
End: 2023-03-21

## 2023-03-24 ENCOUNTER — TRANSCRIPTION ENCOUNTER (OUTPATIENT)
Age: 53
End: 2023-03-24

## 2023-03-27 ENCOUNTER — TRANSCRIPTION ENCOUNTER (OUTPATIENT)
Age: 53
End: 2023-03-27

## 2023-03-28 ENCOUNTER — TRANSCRIPTION ENCOUNTER (OUTPATIENT)
Age: 53
End: 2023-03-28

## 2023-03-29 ENCOUNTER — TRANSCRIPTION ENCOUNTER (OUTPATIENT)
Age: 53
End: 2023-03-29

## 2023-04-04 ENCOUNTER — APPOINTMENT (OUTPATIENT)
Dept: INTERNAL MEDICINE | Facility: CLINIC | Age: 53
End: 2023-04-04
Payer: MEDICAID

## 2023-04-04 VITALS
SYSTOLIC BLOOD PRESSURE: 118 MMHG | WEIGHT: 219 LBS | OXYGEN SATURATION: 98 % | BODY MASS INDEX: 40.06 KG/M2 | HEART RATE: 86 BPM | DIASTOLIC BLOOD PRESSURE: 80 MMHG

## 2023-04-04 PROCEDURE — 99214 OFFICE O/P EST MOD 30 MIN: CPT

## 2023-04-04 RX ORDER — LORATADINE 5 MG/5 ML
0.05 SOLUTION, ORAL ORAL
Qty: 1 | Refills: 0 | Status: DISCONTINUED | COMMUNITY
Start: 2023-01-30 | End: 2023-04-04

## 2023-04-04 RX ORDER — METHYLPREDNISOLONE 4 MG/1
4 TABLET ORAL
Qty: 1 | Refills: 0 | Status: DISCONTINUED | COMMUNITY
Start: 2023-01-30 | End: 2023-04-04

## 2023-04-04 RX ORDER — MUPIROCIN 20 MG/G
2 OINTMENT TOPICAL
Qty: 22 | Refills: 0 | Status: DISCONTINUED | COMMUNITY
Start: 2022-03-04 | End: 2023-04-04

## 2023-04-04 RX ORDER — DICLOFENAC SODIUM 100 MG/1
100 TABLET, FILM COATED, EXTENDED RELEASE ORAL
Qty: 30 | Refills: 3 | Status: DISCONTINUED | COMMUNITY
Start: 2021-05-12 | End: 2023-04-04

## 2023-04-04 RX ORDER — LIDOCAINE 40 MG/G
4 PATCH TOPICAL
Qty: 15 | Refills: 0 | Status: DISCONTINUED | COMMUNITY
Start: 2021-11-30 | End: 2023-04-04

## 2023-04-04 RX ORDER — FAMOTIDINE 40 MG/1
40 TABLET, FILM COATED ORAL
Qty: 30 | Refills: 0 | Status: DISCONTINUED | COMMUNITY
Start: 2022-11-30 | End: 2023-04-04

## 2023-04-04 RX ORDER — DOXYCYCLINE 100 MG/1
100 CAPSULE ORAL
Qty: 20 | Refills: 0 | Status: DISCONTINUED | COMMUNITY
Start: 2023-01-30 | End: 2023-04-04

## 2023-04-04 NOTE — HISTORY OF PRESENT ILLNESS
[de-identified] : \par  \par  [FreeTextEntry8] : CC: headache\par HA- pt now states it was due to stopping the cymbalta- so pt restarted cymbalta 20 mg b/c pain came back- restarted 3 wk ago. and HA is much better\par reviewed 2/24/23 MRI / MRA brain- nl.  seen Dr. Persaud\par c/o pressure , buzzing in the head is resolved.  \par when turning her head, feels like her "eye is a shutter"- resolved\par reviewed 2/1/23 ENT- had elev BP - was seen in our office 2/1/23 - elev BP.  was tx w steroeds for sinusitis 1/1-1/2/23\par reviewed 2/6/23 CT- nasal septal dev\par reviewed 2/1/23 labs- , elev AST, ALT, A1c 8.5\par DM-f/u w Endo started ozempic 6 wk ago.  DM- compliant w diet\par also reviewed hypoglycemia symptoms- no symptoms\par has bandar- fasting glucose \par reviewed 2/3/23 Endo notes- \par elev LFT pt denies any alcohol or herbal supplements.   pt has f/u w hepatology at [facility]-  reviewed 3/29/22 Elastography- F3\par low vit D- pt is taking vit D.\par thyroid  -   reviewed 7/14/22 Endo note- reviewed 7/20/22 US thyroid- no chg\par reviewed  1/18/23 Rheum note- -diane jts- has psoriatic arthritis- no chg in meds. \par pt c/o wgt gain, sweats on higher dosecymbalta- \par chronic fatigue- sleeping better.  cymbalta  20 mg- \par hx of chronic back pain- 80% better since taking cymbalta. seen spine, pain manage ment\par hx of breast CA- being f/u at [facility]- last seen 2 wk ago- due for PET scan August- CT abd 5/11/21 sacral mets.  MRI - atypical hemangioma, spondylosis. was in remission for 9 yr then dev mets to sacrum. \par reviewed 8/4/22 PET scan- splenic lesion prob benign\par GERD-- 2 / wk. not taken any meds. reviewed 12/7/21 EGD- non erosive gastritis, polyp,\par muscle pain- seen Rehab- started flexeril\par

## 2023-04-14 ENCOUNTER — RX RENEWAL (OUTPATIENT)
Age: 53
End: 2023-04-14

## 2023-04-17 ENCOUNTER — TRANSCRIPTION ENCOUNTER (OUTPATIENT)
Age: 53
End: 2023-04-17

## 2023-04-18 ENCOUNTER — TRANSCRIPTION ENCOUNTER (OUTPATIENT)
Age: 53
End: 2023-04-18

## 2023-04-19 ENCOUNTER — APPOINTMENT (OUTPATIENT)
Dept: ENDOCRINOLOGY | Facility: CLINIC | Age: 53
End: 2023-04-19
Payer: MEDICAID

## 2023-04-19 VITALS
SYSTOLIC BLOOD PRESSURE: 110 MMHG | OXYGEN SATURATION: 97 % | WEIGHT: 217.56 LBS | HEIGHT: 62 IN | DIASTOLIC BLOOD PRESSURE: 76 MMHG | TEMPERATURE: 97.6 F | BODY MASS INDEX: 40.03 KG/M2 | HEART RATE: 91 BPM

## 2023-04-19 PROCEDURE — 95251 CONT GLUC MNTR ANALYSIS I&R: CPT

## 2023-04-19 PROCEDURE — 99214 OFFICE O/P EST MOD 30 MIN: CPT | Mod: 25

## 2023-04-19 PROCEDURE — 36415 COLL VENOUS BLD VENIPUNCTURE: CPT

## 2023-04-20 ENCOUNTER — RX RENEWAL (OUTPATIENT)
Age: 53
End: 2023-04-20

## 2023-04-20 RX ORDER — SEMAGLUTIDE 0.68 MG/ML
2 INJECTION, SOLUTION SUBCUTANEOUS
Qty: 1 | Refills: 2 | Status: COMPLETED | COMMUNITY
Start: 2023-01-13 | End: 2023-04-20

## 2023-04-20 NOTE — HISTORY OF PRESENT ILLNESS
[FreeTextEntry1] : CC: Thyroid follow-up\par This is a 53-year-old female with type 2 diabetes mellitus, thyroid calcification, with a history of breast cancer status post right mastectomy, hepatic cyst, splenic lesions, anemia, psoriatic arthritis, fibromyalgia, here for follow-up.\par Diabetes was diagnosed in August 2022 when HgbA1c was found to be 6.9%. She is on Ozempic 0.5 mg weekly. She is tolerating it well but she reports gas which is improving. \par Last ophthalmology exam was in February 2023. Denies retinopathy. Denies nephropathy. Denies neuropathy. She is not on a statin. She is not on an Ace inhibitor or ARB. Freestyle bandar CGM downloaded, interpreted, and reviewed. See scanned report below. \par \par CT chest from August 2021 showed unchanged subcentimeter calcified left thyroid nodule. Thyroid ultrasound from December 27, 2021 showed left 0.9 x 0.6 x 0.3 cm coarse calcification in the lower lobe, unchanged compared to prior.\par No history of radiation therapy to the head or neck.\par No family history of thyroid cancer.\par No obstructive symptoms.\par Thyroid antibodies are negative. \par \par \par

## 2023-04-20 NOTE — ASSESSMENT
[FreeTextEntry1] : This is a 53-year-old female with type 2 diabetes mellitus, thyroid calcification, with a history of breast cancer status post right mastectomy, hepatic cyst, splenic lesions, anemia, psoriatic arthritis, fibromyalgia, here for follow-up.\par 1. Type 2 diabetes \par Diabetes was diagnosed in August 2022 when HgbA1c was found to be 6.9%. Check hbA1c. \par She is on Ozempic 0.5 mg weekly.\par Last ophthalmology exam was in February 2023. Denies retinopathy. \par Denies nephropathy.\par  Denies neuropathy.\par  She is not on a statin. \par She is not on an Ace inhibitor or ARB.\par  Freestyle Parth CGM downloaded, interpreted and reviewed. Date Range: 4/6/2023 to 4/19/2023, Average glucose 115 mg/DL, glucose variability 16.5%, GMI 6.1%, Time in Range 100%, High 0%, Very High 0%, Low 0%, Very Low 0%. Increase Ozempic to 1 mg weekly for potential benefit of weight loss. \par Avoid metformin given elevated transaminase levels.\par \par \par 2. Thyroid calcification\par Thyroid ultrasound from July 2022 showed Coarse calcification identified within the lower pole of the left lobe not significantly changed from previous exam. No other cystic or solid masses or calcifications were seen. \par I spoke with radiologist Dr. Matias in July 2022 and reports it looks benign. No further ultrasound needed.

## 2023-04-20 NOTE — REVIEW OF SYSTEMS
[Negative] : Heme/Lymph [Recent Weight Loss (___ Lbs)] : recent weight loss: [unfilled] lbs [FreeTextEntry7] : gas

## 2023-04-20 NOTE — PHYSICAL EXAM
[Alert] : alert [Well Nourished] : well nourished [Healthy Appearance] : healthy appearance [Obese] : obese [No Acute Distress] : no acute distress [Well Developed] : well developed [Normal Voice/Communication] : normal voice communication [Normal Sclera/Conjunctiva] : normal sclera/conjunctiva [No Proptosis] : no proptosis [No LAD] : no lymphadenopathy [No Neck Mass] : no neck mass was observed [Supple] : the neck was supple [Thyroid Not Enlarged] : the thyroid was not enlarged [No Thyroid Nodules] : no palpable thyroid nodules [No Respiratory Distress] : no respiratory distress [No Edema] : no peripheral edema [Pedal Pulses Normal] : the pedal pulses are present [No Stigmata of Cushings Syndrome] : no stigmata of Cushings Syndrome [Right foot was examined, including] : right foot ~C was examined, including visual inspection with sensory and pulse exams [Left foot was examined, including] : left foot ~C was examined, including visual inspection with sensory and pulse exams [Normal] : normal [2+] : 2+ in the dorsalis pedis [Normal Sensation on Monofilament Testing] : normal sensation on monofilament testing of lower extremities [Normal Affect] : the affect was normal [Normal Insight/Judgement] : insight and judgment were intact [Normal Mood] : the mood was normal [Diminished Throughout Both Feet] : normal tactile sensation with monofilament testing throughout both feet

## 2023-04-25 ENCOUNTER — NON-APPOINTMENT (OUTPATIENT)
Age: 53
End: 2023-04-25

## 2023-04-25 LAB
ALBUMIN SERPL ELPH-MCNC: 4.8 G/DL
ALP BLD-CCNC: 86 U/L
ALT SERPL-CCNC: 81 U/L
ANION GAP SERPL CALC-SCNC: 13 MMOL/L
AST SERPL-CCNC: 59 U/L
BASOPHILS # BLD AUTO: 0.05 K/UL
BASOPHILS NFR BLD AUTO: 0.8 %
BILIRUB SERPL-MCNC: 0.9 MG/DL
BUN SERPL-MCNC: 12 MG/DL
CALCIUM SERPL-MCNC: 9.7 MG/DL
CHLORIDE SERPL-SCNC: 106 MMOL/L
CHOLEST SERPL-MCNC: 171 MG/DL
CO2 SERPL-SCNC: 23 MMOL/L
CREAT SERPL-MCNC: 0.78 MG/DL
CREAT SPEC-SCNC: 219 MG/DL
EGFR: 91 ML/MIN/1.73M2
EOSINOPHIL # BLD AUTO: 0.13 K/UL
EOSINOPHIL NFR BLD AUTO: 2.1 %
ESTIMATED AVERAGE GLUCOSE: 111 MG/DL
GLUCOSE SERPL-MCNC: 111 MG/DL
HBA1C MFR BLD HPLC: 5.5 %
HCT VFR BLD CALC: 43.1 %
HDLC SERPL-MCNC: 41 MG/DL
HGB BLD-MCNC: 13.9 G/DL
IMM GRANULOCYTES NFR BLD AUTO: 0.2 %
LDLC SERPL CALC-MCNC: 102 MG/DL
LYMPHOCYTES # BLD AUTO: 1.71 K/UL
LYMPHOCYTES NFR BLD AUTO: 27 %
MAN DIFF?: NORMAL
MCHC RBC-ENTMCNC: 29.4 PG
MCHC RBC-ENTMCNC: 32.3 GM/DL
MCV RBC AUTO: 91.1 FL
MICROALBUMIN 24H UR DL<=1MG/L-MCNC: 2.3 MG/DL
MICROALBUMIN/CREAT 24H UR-RTO: 11 MG/G
MONOCYTES # BLD AUTO: 0.5 K/UL
MONOCYTES NFR BLD AUTO: 7.9 %
NEUTROPHILS # BLD AUTO: 3.93 K/UL
NEUTROPHILS NFR BLD AUTO: 62 %
NONHDLC SERPL-MCNC: 130 MG/DL
PLATELET # BLD AUTO: 229 K/UL
POTASSIUM SERPL-SCNC: 4.3 MMOL/L
PROT SERPL-MCNC: 6.7 G/DL
RBC # BLD: 4.73 M/UL
RBC # FLD: 12.2 %
SODIUM SERPL-SCNC: 142 MMOL/L
TRIGL SERPL-MCNC: 140 MG/DL
TSH SERPL-ACNC: 0.56 UIU/ML
WBC # FLD AUTO: 6.33 K/UL

## 2023-05-01 ENCOUNTER — TRANSCRIPTION ENCOUNTER (OUTPATIENT)
Age: 53
End: 2023-05-01

## 2023-05-02 ENCOUNTER — TRANSCRIPTION ENCOUNTER (OUTPATIENT)
Age: 53
End: 2023-05-02

## 2023-05-03 ENCOUNTER — TRANSCRIPTION ENCOUNTER (OUTPATIENT)
Age: 53
End: 2023-05-03

## 2023-05-04 ENCOUNTER — TRANSCRIPTION ENCOUNTER (OUTPATIENT)
Age: 53
End: 2023-05-04

## 2023-05-12 ENCOUNTER — APPOINTMENT (OUTPATIENT)
Dept: INTERNAL MEDICINE | Facility: CLINIC | Age: 53
End: 2023-05-12
Payer: MEDICAID

## 2023-05-12 ENCOUNTER — APPOINTMENT (OUTPATIENT)
Dept: ORTHOPEDIC SURGERY | Facility: CLINIC | Age: 53
End: 2023-05-12
Payer: MEDICAID

## 2023-05-12 DIAGNOSIS — Z96.653 PRESENCE OF ARTIFICIAL KNEE JOINT, BILATERAL: ICD-10-CM

## 2023-05-12 PROCEDURE — 73562 X-RAY EXAM OF KNEE 3: CPT | Mod: 50

## 2023-05-12 PROCEDURE — 99214 OFFICE O/P EST MOD 30 MIN: CPT

## 2023-05-12 PROCEDURE — 72170 X-RAY EXAM OF PELVIS: CPT

## 2023-05-12 PROCEDURE — 36415 COLL VENOUS BLD VENIPUNCTURE: CPT

## 2023-05-13 LAB
ALBUMIN SERPL ELPH-MCNC: 4.7 G/DL
ALP BLD-CCNC: 76 U/L
ALT SERPL-CCNC: 65 U/L
ANION GAP SERPL CALC-SCNC: 13 MMOL/L
AST SERPL-CCNC: 53 U/L
BILIRUB SERPL-MCNC: 1.1 MG/DL
BUN SERPL-MCNC: 13 MG/DL
CALCIUM SERPL-MCNC: 10 MG/DL
CHLORIDE SERPL-SCNC: 106 MMOL/L
CHOLEST SERPL-MCNC: 181 MG/DL
CO2 SERPL-SCNC: 23 MMOL/L
CREAT SERPL-MCNC: 0.71 MG/DL
EGFR: 102 ML/MIN/1.73M2
ESTIMATED AVERAGE GLUCOSE: 103 MG/DL
GLUCOSE SERPL-MCNC: 93 MG/DL
HBA1C MFR BLD HPLC: 5.2 %
HDLC SERPL-MCNC: 44 MG/DL
LDLC SERPL CALC-MCNC: 111 MG/DL
LDLC SERPL DIRECT ASSAY-MCNC: 111 MG/DL
NONHDLC SERPL-MCNC: 137 MG/DL
POTASSIUM SERPL-SCNC: 4.3 MMOL/L
PROT SERPL-MCNC: 7.3 G/DL
SODIUM SERPL-SCNC: 142 MMOL/L
TRIGL SERPL-MCNC: 130 MG/DL

## 2023-05-19 ENCOUNTER — NON-APPOINTMENT (OUTPATIENT)
Age: 53
End: 2023-05-19

## 2023-05-22 ENCOUNTER — TRANSCRIPTION ENCOUNTER (OUTPATIENT)
Age: 53
End: 2023-05-22

## 2023-05-30 ENCOUNTER — TRANSCRIPTION ENCOUNTER (OUTPATIENT)
Age: 53
End: 2023-05-30

## 2023-06-06 ENCOUNTER — APPOINTMENT (OUTPATIENT)
Dept: RHEUMATOLOGY | Facility: CLINIC | Age: 53
End: 2023-06-06
Payer: MEDICAID

## 2023-06-06 VITALS
RESPIRATION RATE: 16 BRPM | BODY MASS INDEX: 39.56 KG/M2 | OXYGEN SATURATION: 96 % | HEIGHT: 62 IN | SYSTOLIC BLOOD PRESSURE: 104 MMHG | DIASTOLIC BLOOD PRESSURE: 62 MMHG | TEMPERATURE: 97.1 F | WEIGHT: 215 LBS | HEART RATE: 92 BPM

## 2023-06-06 PROCEDURE — 99214 OFFICE O/P EST MOD 30 MIN: CPT | Mod: GC

## 2023-06-09 NOTE — ASSESSMENT
[FreeTextEntry1] : 53 year old female with psoriasis and PsA\par \par 1. PsA: No axial involvement per imaging but has active skin disease and erosive arthritis. much improved on Cosentyx but still with persistent skin and joint/tendon symptoms. No signs of synovitis on exam. FM possibly confounding.No improvement with Cosentyx 300mg/month thus discussed changing therapy to an [id number] inhibitor or IL17A inhibitor but patient want to wait till her other medications are adjusted. Advised to call me back in 1 month to discuss. \par \par Previously on Stelara which did not improve skin/joint disease. Did not respond adequately to Otezla in the past either. Anti-TNF and Tofacitinib are not attractive choices given h/o metastatic breast cancer in the recent past, although she has been LAYLA for at least 5 years. \par \par 2. Lower back pain- likely musculoskeletal; no axial involvement of PsA on MRI imaging within past year. However PET scan in the past have shown inflammation in the area\par Patient planned for repeat PET scan\par To reevaluate continuation of cosentyx vs switching meds pending results\par \par 3. Abnormal LFTs: f/u hepatology. given improvement plan for repeat scan\par \par Follow up in 2 -3 months \par \par case d/w Dr. Allen\par \par Parth Ramirez MD, PGY-4\par Rheumatology Fellow\par [facility]\par

## 2023-06-09 NOTE — PHYSICAL EXAM
[General Appearance - Alert] : alert [General Appearance - In No Acute Distress] : in no acute distress [General Appearance - Well Nourished] : well nourished [General Appearance - Well Developed] : well developed [Sclera] : the sclera and conjunctiva were normal [PERRL With Normal Accommodation] : pupils were equal in size, round, and reactive to light [Examination Of The Oral Cavity] : the lips and gums were normal [Oropharynx] : the oropharynx was normal [] : no respiratory distress [Respiration, Rhythm And Depth] : normal respiratory rhythm and effort [Auscultation Breath Sounds / Voice Sounds] : lungs were clear to auscultation bilaterally [Heart Rate And Rhythm] : heart rate was normal and rhythm regular [Heart Sounds] : normal S1 and S2 [Edema] : there was no peripheral edema [Bowel Sounds] : normal bowel sounds [Abdomen Soft] : soft [FreeTextEntry1] : healed psoriatic plaque on the back and abdomen

## 2023-06-09 NOTE — HISTORY OF PRESENT ILLNESS
[___ Month(s) Ago] : [unfilled] month(s) ago [FreeTextEntry1] : 6/2023\par endorses improvement in PsO with cosentyx with significant improvement in skin. patient continues to endorse back pain. made worse with movement and improved with rest. denies ocular/GI/ symptoms. denies fecers/chills/cp/SOB

## 2023-06-23 ENCOUNTER — NON-APPOINTMENT (OUTPATIENT)
Age: 53
End: 2023-06-23

## 2023-06-23 ENCOUNTER — TRANSCRIPTION ENCOUNTER (OUTPATIENT)
Age: 53
End: 2023-06-23

## 2023-06-26 ENCOUNTER — TRANSCRIPTION ENCOUNTER (OUTPATIENT)
Age: 53
End: 2023-06-26

## 2023-06-30 ENCOUNTER — NON-APPOINTMENT (OUTPATIENT)
Age: 53
End: 2023-06-30

## 2023-07-03 ENCOUNTER — TRANSCRIPTION ENCOUNTER (OUTPATIENT)
Age: 53
End: 2023-07-03

## 2023-07-03 RX ORDER — SECUKINUMAB 150 MG/ML
INJECTION SUBCUTANEOUS
Qty: 3 | Refills: 0 | Status: DISCONTINUED | COMMUNITY
Start: 2021-11-22 | End: 2023-07-03

## 2023-07-06 ENCOUNTER — TRANSCRIPTION ENCOUNTER (OUTPATIENT)
Age: 53
End: 2023-07-06

## 2023-07-21 ENCOUNTER — APPOINTMENT (OUTPATIENT)
Dept: INTERNAL MEDICINE | Facility: CLINIC | Age: 53
End: 2023-07-21
Payer: MEDICAID

## 2023-07-21 VITALS — WEIGHT: 212 LBS | HEART RATE: 103 BPM | OXYGEN SATURATION: 97 % | BODY MASS INDEX: 38.78 KG/M2

## 2023-07-21 VITALS — SYSTOLIC BLOOD PRESSURE: 100 MMHG | DIASTOLIC BLOOD PRESSURE: 80 MMHG

## 2023-07-21 PROCEDURE — 99214 OFFICE O/P EST MOD 30 MIN: CPT

## 2023-07-21 NOTE — HISTORY OF PRESENT ILLNESS
[FreeTextEntry1] : back pain [de-identified] : \par  \par  [FreeTextEntry8] : had exaccerbation of chronic back pain- pt was in the [facility] [place] ER for back pain- reviewed labs 6/19/23- no signif chg in LFT,  reviewed 6/19/23 MRI entire spine- mild DJD of cerv, lumbar spine.  no cord compression\par reviewed  6/6/23 Rheum note- -achy jts- has psoriatic arthritis- said back pain - prob MS\par pt incr cymbalta 40 mg and back pain is better\par chronic fatigue- sleeping better.  cymbalta  \par hx of chronic back pain- 30% better since incr cymbalta.- has incr hot flashes\par knee pain- reviewed 5/12/23 Ortho notes- xray knee/ pelvis\par HA-resolved\par reviewed 2/24/23 MRI / MRA brain- nl.  seen Dr. York\par DM-f/u w Endo on ozempic 6 wk ago.  DM- compliant w diet.  reviewed 4/19/23 Endo notes- US thyroid- benign calcif. \par also reviewed hypoglycemia symptoms- no symptoms\par has bandar- fasting glucose \par reviewed 5/13/23 A1c 5.3\par pt has appt w Endo next wk- has bloating w ozempic\par elev LFT pt denies any alcohol or herbal supplements.   pt has f/u w hepatology at [facility]- seen 3 mo ago-  reviewed 3/29/22 Elastography- F3\par low vit D- pt is taking vit D.\par hx of breast CA- being f/u at [facility]- last seen 2 wk ago- due for PET scan August- CT abd 5/11/21 sacral mets.  MRI - atypical hemangioma, spondylosis. was in remission for 9 yr then dev mets to sacrum. \par reviewed 8/4/22 PET scan- splenic lesion prob benign\par GERD-- 2 / wk. not taken any meds. reviewed 12/7/21 EGD- non erosive gastritis, polyp,\par muscle pain- seen Rehab- started flexeril\par

## 2023-07-26 NOTE — PHYSICAL EXAM
[Alert] : alert [Well Nourished] : well nourished [Healthy Appearance] : healthy appearance [Obese] : obese [No Acute Distress] : no acute distress [Normal Voice/Communication] : normal voice communication [Well Developed] : well developed [Normal Sclera/Conjunctiva] : normal sclera/conjunctiva [No Proptosis] : no proptosis [No Neck Mass] : no neck mass was observed [No LAD] : no lymphadenopathy [Supple] : the neck was supple [Thyroid Not Enlarged] : the thyroid was not enlarged [No Thyroid Nodules] : no palpable thyroid nodules [No Edema] : no peripheral edema [No Respiratory Distress] : no respiratory distress [Pedal Pulses Normal] : the pedal pulses are present [No Stigmata of Cushings Syndrome] : no stigmata of Cushings Syndrome [Right foot was examined, including] : right foot ~C was examined, including visual inspection with sensory and pulse exams [Left foot was examined, including] : left foot ~C was examined, including visual inspection with sensory and pulse exams [2+] : 2+ in the dorsalis pedis [Normal Sensation on Monofilament Testing] : normal sensation on monofilament testing of lower extremities [Normal Affect] : the affect was normal [Normal Insight/Judgement] : insight and judgment were intact [Normal Mood] : the mood was normal

## 2023-07-27 ENCOUNTER — APPOINTMENT (OUTPATIENT)
Dept: ENDOCRINOLOGY | Facility: CLINIC | Age: 53
End: 2023-07-27
Payer: MEDICAID

## 2023-07-27 VITALS
OXYGEN SATURATION: 98 % | BODY MASS INDEX: 36.02 KG/M2 | TEMPERATURE: 97.7 F | WEIGHT: 211 LBS | SYSTOLIC BLOOD PRESSURE: 111 MMHG | HEART RATE: 96 BPM | DIASTOLIC BLOOD PRESSURE: 71 MMHG | HEIGHT: 64 IN

## 2023-07-27 PROCEDURE — 99213 OFFICE O/P EST LOW 20 MIN: CPT | Mod: 25

## 2023-07-27 PROCEDURE — 95251 CONT GLUC MNTR ANALYSIS I&R: CPT

## 2023-08-02 ENCOUNTER — NON-APPOINTMENT (OUTPATIENT)
Age: 53
End: 2023-08-02

## 2023-08-08 NOTE — REVIEW OF SYSTEMS
[Recent Weight Loss (___ Lbs)] : recent weight loss: [unfilled] lbs [Negative] : Heme/Lymph [Fatigue] : fatigue [Constipation] : constipation [FreeTextEntry7] : gas

## 2023-08-08 NOTE — HISTORY OF PRESENT ILLNESS
[FreeTextEntry1] : CC: Thyroid follow-up\par This is a 53-year-old female with type 2 diabetes mellitus, thyroid calcification, with a history of breast cancer status post right mastectomy, hepatic cyst, splenic lesions, anemia, psoriatic arthritis, fibromyalgia, here for follow-up.\par Diabetes was diagnosed in August 2022 when HgbA1c was found to be 6.9%. She is on Ozempic 1 mg weekly. She reports constipation. She also states she feels lethargic which she attributes to Ozempic. \par Last ophthalmology exam was in February 2023. Denies retinopathy. Denies nephropathy. Denies neuropathy. She is not on a statin. She is not on an Ace inhibitor or ARB. Freestyle bandar CGM downloaded, interpreted, and reviewed. See scanned report below. \par \par CT chest from August 2021 showed unchanged subcentimeter calcified left thyroid nodule. Thyroid ultrasound from December 27, 2021 showed left 0.9 x 0.6 x 0.3 cm coarse calcification in the lower lobe, unchanged compared to prior.\par No history of radiation therapy to the head or neck.\par No family history of thyroid cancer.\par No obstructive symptoms.\par Thyroid antibodies are negative. \par

## 2023-08-08 NOTE — ASSESSMENT
[FreeTextEntry1] : This is a 53-year-old female with type 2 diabetes mellitus, thyroid calcification, with a history of breast cancer status post right mastectomy, hepatic cyst, splenic lesions, anemia, psoriatic arthritis, fibromyalgia, here for follow-up. 1. Type 2 diabetes  Diabetes was diagnosed in August 2022 when HgbA1c was found to be 6.9%.  She is on Ozempic 1 mg weekly. Last ophthalmology exam was in February 2023. Denies retinopathy.  Denies nephropathy.  Denies neuropathy.  She is not on a statin.  She is not on an Ace inhibitor or ARB.   Freestyle Parth CGM downloaded, interpreted and reviewed. Date Range: 7/14/2023 to 7/27/2023, Average glucose 123 mg/DL, glucose variability 16.1%, GMI 6.3%, Time in Range 99%, High 1%, Very High 0%, Low 0%, Very Low 0%.   Avoid metformin given elevated transaminase levels. She reports constipation. She also states she feels lethargic which she attributes to Ozempic.  Check HbA1c in August (3 months after last hbA1c).  She prefers to remain on Ozempic at this time. If constipation does not improve in next few weeks will consider discontinuing Ozempic.   2. Thyroid calcification Thyroid ultrasound from July 2022 showed Coarse calcification identified within the lower pole of the left lobe not significantly changed from previous exam. No other cystic or solid masses or calcifications were seen.  I spoke with radiologist Dr. Matias in July 2022 and reports it looks benign. No further ultrasound needed.

## 2023-08-10 ENCOUNTER — RX RENEWAL (OUTPATIENT)
Age: 53
End: 2023-08-10

## 2023-09-06 ENCOUNTER — LABORATORY RESULT (OUTPATIENT)
Age: 53
End: 2023-09-06

## 2023-09-06 ENCOUNTER — APPOINTMENT (OUTPATIENT)
Dept: RHEUMATOLOGY | Facility: CLINIC | Age: 53
End: 2023-09-06
Payer: MEDICAID

## 2023-09-06 VITALS — OXYGEN SATURATION: 96 % | HEIGHT: 64 IN | WEIGHT: 214 LBS | BODY MASS INDEX: 36.54 KG/M2 | RESPIRATION RATE: 16 BRPM

## 2023-09-06 PROCEDURE — 99214 OFFICE O/P EST MOD 30 MIN: CPT

## 2023-09-06 NOTE — HISTORY OF PRESENT ILLNESS
[___ Month(s) Ago] : [unfilled] month(s) ago [FreeTextEntry1] : Went off the Ozempic as she was having GI issues and also her A1C dropped. The back pain improved a little after she stopped her Ozempic. Injected Cosentyx - did only one injection instead of two as the injection malfunctioned.

## 2023-09-06 NOTE — ASSESSMENT
[FreeTextEntry1] : 53 year old female with psoriasis and PsA\par  \par  1. PsA: No axial involvement per imaging but has active skin disease and erosive arthritis. much improved on Cosentyx but still with persistent skin and joint/tendon symptoms. No signs of synovitis on exam. FM possibly confounding.No improvement with Cosentyx 300mg/month thus discussed changing therapy to an [id number] inhibitor or IL17A inhibitor but patient want to wait till her other medications are adjusted. Advised to call me back in 1 month to discuss. \par  \par  Previously on Stelara which did not improve skin/joint disease. Did not respond adequately to Otezla in the past either. Anti-TNF and Tofacitinib are not attractive choices given h/o metastatic breast cancer in the recent past, although she has been LAYLA for at least 5 years. \par  \par  2. Lower back pain- likely musculoskeletal; no axial involvement of PsA on MRI imaging within past year. However PET scan in the past have shown inflammation in the area\par  Patient planned for repeat PET scan\par  To reevaluate continuation of cosentyx vs switching meds pending results\par  \par  3. Abnormal LFTs: f/u hepatology. given improvement plan for repeat scan\par  \par  Follow up in 2 -3 months \par  \par  case d/w Dr. Allen\par  \par  Parth Ramirez MD, PGY-4\par  Rheumatology Fellow\par  [facility]\par

## 2023-09-07 ENCOUNTER — APPOINTMENT (OUTPATIENT)
Dept: RADIOLOGY | Facility: CLINIC | Age: 53
End: 2023-09-07
Payer: MEDICAID

## 2023-09-07 ENCOUNTER — OUTPATIENT (OUTPATIENT)
Dept: OUTPATIENT SERVICES | Facility: HOSPITAL | Age: 53
LOS: 1 days | End: 2023-09-07
Payer: MEDICAID

## 2023-09-07 DIAGNOSIS — Z90.722 ACQUIRED ABSENCE OF OVARIES, BILATERAL: Chronic | ICD-10-CM

## 2023-09-07 DIAGNOSIS — M19.90 UNSPECIFIED OSTEOARTHRITIS, UNSPECIFIED SITE: ICD-10-CM

## 2023-09-07 DIAGNOSIS — Z90.10 ACQUIRED ABSENCE OF UNSPECIFIED BREAST AND NIPPLE: Chronic | ICD-10-CM

## 2023-09-07 PROCEDURE — 73130 X-RAY EXAM OF HAND: CPT | Mod: 26,50

## 2023-09-07 PROCEDURE — 73130 X-RAY EXAM OF HAND: CPT

## 2023-09-14 ENCOUNTER — TRANSCRIPTION ENCOUNTER (OUTPATIENT)
Age: 53
End: 2023-09-14

## 2023-09-15 ENCOUNTER — TRANSCRIPTION ENCOUNTER (OUTPATIENT)
Age: 53
End: 2023-09-15

## 2023-09-20 ENCOUNTER — RX RENEWAL (OUTPATIENT)
Age: 53
End: 2023-09-20

## 2023-09-21 ENCOUNTER — TRANSCRIPTION ENCOUNTER (OUTPATIENT)
Age: 53
End: 2023-09-21

## 2023-09-22 ENCOUNTER — APPOINTMENT (OUTPATIENT)
Dept: OPHTHALMOLOGY | Facility: CLINIC | Age: 53
End: 2023-09-22
Payer: MEDICAID

## 2023-09-22 PROCEDURE — 92014 COMPRE OPH EXAM EST PT 1/>: CPT

## 2023-09-25 ENCOUNTER — TRANSCRIPTION ENCOUNTER (OUTPATIENT)
Age: 53
End: 2023-09-25

## 2023-09-27 ENCOUNTER — TRANSCRIPTION ENCOUNTER (OUTPATIENT)
Age: 53
End: 2023-09-27

## 2023-09-28 ENCOUNTER — TRANSCRIPTION ENCOUNTER (OUTPATIENT)
Age: 53
End: 2023-09-28

## 2023-09-29 ENCOUNTER — TRANSCRIPTION ENCOUNTER (OUTPATIENT)
Age: 53
End: 2023-09-29

## 2023-10-25 ENCOUNTER — TRANSCRIPTION ENCOUNTER (OUTPATIENT)
Age: 53
End: 2023-10-25

## 2023-10-26 ENCOUNTER — RX RENEWAL (OUTPATIENT)
Age: 53
End: 2023-10-26

## 2023-10-27 ENCOUNTER — TRANSCRIPTION ENCOUNTER (OUTPATIENT)
Age: 53
End: 2023-10-27

## 2023-10-27 ENCOUNTER — APPOINTMENT (OUTPATIENT)
Dept: ORTHOPEDIC SURGERY | Facility: CLINIC | Age: 53
End: 2023-10-27
Payer: MEDICAID

## 2023-10-27 VITALS
TEMPERATURE: 97.8 F | BODY MASS INDEX: 36.7 KG/M2 | OXYGEN SATURATION: 100 % | HEART RATE: 76 BPM | SYSTOLIC BLOOD PRESSURE: 127 MMHG | HEIGHT: 64 IN | DIASTOLIC BLOOD PRESSURE: 84 MMHG | WEIGHT: 215 LBS

## 2023-10-27 DIAGNOSIS — Z96.659 PRESENCE OF UNSPECIFIED ARTIFICIAL KNEE JOINT: ICD-10-CM

## 2023-10-27 LAB
ALBUMIN SERPL ELPH-MCNC: 4.8 G/DL
ALP BLD-CCNC: 127 U/L
ALT SERPL-CCNC: 220 U/L
ANION GAP SERPL CALC-SCNC: 15 MMOL/L
APPEARANCE: CLEAR
AST SERPL-CCNC: 212 U/L
BASOPHILS # BLD AUTO: 0.07 K/UL
BASOPHILS NFR BLD AUTO: 1.1 %
BILIRUB SERPL-MCNC: 0.7 MG/DL
BILIRUBIN URINE: NEGATIVE
BLOOD URINE: NEGATIVE
BUN SERPL-MCNC: 15 MG/DL
CALCIUM SERPL-MCNC: 10 MG/DL
CHLORIDE SERPL-SCNC: 99 MMOL/L
CK SERPL-CCNC: 65 U/L
CO2 SERPL-SCNC: 24 MMOL/L
COLOR: YELLOW
CREAT SERPL-MCNC: 0.64 MG/DL
CREAT SPEC-SCNC: 61 MG/DL
CREAT/PROT UR: 0.2 RATIO
CRP SERPL-MCNC: 8 MG/L
EGFR: 106 ML/MIN/1.73M2
EOSINOPHIL # BLD AUTO: 0.14 K/UL
EOSINOPHIL NFR BLD AUTO: 2.2 %
ERYTHROCYTE [SEDIMENTATION RATE] IN BLOOD BY WESTERGREN METHOD: 6 MM/HR
GLUCOSE QUALITATIVE U: >=1000 MG/DL
GLUCOSE SERPL-MCNC: 371 MG/DL
HCT VFR BLD CALC: 43.1 %
HGB BLD-MCNC: 13.8 G/DL
IMM GRANULOCYTES NFR BLD AUTO: 0.3 %
KETONES URINE: ABNORMAL MG/DL
LEUKOCYTE ESTERASE URINE: NEGATIVE
LYMPHOCYTES # BLD AUTO: 1.83 K/UL
LYMPHOCYTES NFR BLD AUTO: 28.9 %
MAN DIFF?: NORMAL
MCHC RBC-ENTMCNC: 29.1 PG
MCHC RBC-ENTMCNC: 32 GM/DL
MCV RBC AUTO: 90.9 FL
MONOCYTES # BLD AUTO: 0.43 K/UL
MONOCYTES NFR BLD AUTO: 6.8 %
NEUTROPHILS # BLD AUTO: 3.84 K/UL
NEUTROPHILS NFR BLD AUTO: 60.7 %
NITRITE URINE: NEGATIVE
PH URINE: 6
PLATELET # BLD AUTO: 219 K/UL
POTASSIUM SERPL-SCNC: 4.3 MMOL/L
PROT SERPL-MCNC: 6.7 G/DL
PROT UR-MCNC: 9 MG/DL
PROTEIN URINE: NORMAL MG/DL
RBC # BLD: 4.74 M/UL
RBC # FLD: 12.4 %
SODIUM SERPL-SCNC: 138 MMOL/L
SPECIFIC GRAVITY URINE: >1.03
UROBILINOGEN URINE: 0.2 MG/DL
WBC # FLD AUTO: 6.33 K/UL

## 2023-10-27 PROCEDURE — 73562 X-RAY EXAM OF KNEE 3: CPT | Mod: RT

## 2023-10-27 PROCEDURE — 99213 OFFICE O/P EST LOW 20 MIN: CPT

## 2023-10-29 ENCOUNTER — RX RENEWAL (OUTPATIENT)
Age: 53
End: 2023-10-29

## 2023-10-30 ENCOUNTER — TRANSCRIPTION ENCOUNTER (OUTPATIENT)
Age: 53
End: 2023-10-30

## 2023-11-07 ENCOUNTER — APPOINTMENT (OUTPATIENT)
Dept: NEUROLOGY | Facility: CLINIC | Age: 53
End: 2023-11-07

## 2023-11-08 ENCOUNTER — APPOINTMENT (OUTPATIENT)
Dept: RHEUMATOLOGY | Facility: CLINIC | Age: 53
End: 2023-11-08
Payer: MEDICAID

## 2023-11-08 VITALS — OXYGEN SATURATION: 98 % | WEIGHT: 214 LBS | BODY MASS INDEX: 36.54 KG/M2 | HEIGHT: 64 IN | TEMPERATURE: 98.3 F

## 2023-11-08 PROCEDURE — 99214 OFFICE O/P EST MOD 30 MIN: CPT

## 2023-11-10 ENCOUNTER — APPOINTMENT (OUTPATIENT)
Dept: ORTHOPEDIC SURGERY | Facility: CLINIC | Age: 53
End: 2023-11-10

## 2023-11-14 ENCOUNTER — APPOINTMENT (OUTPATIENT)
Dept: ENDOCRINOLOGY | Facility: CLINIC | Age: 53
End: 2023-11-14
Payer: MEDICAID

## 2023-11-14 VITALS
OXYGEN SATURATION: 98 % | DIASTOLIC BLOOD PRESSURE: 80 MMHG | SYSTOLIC BLOOD PRESSURE: 123 MMHG | HEIGHT: 64 IN | BODY MASS INDEX: 36.54 KG/M2 | WEIGHT: 214 LBS | RESPIRATION RATE: 14 BRPM | HEART RATE: 65 BPM

## 2023-11-14 PROCEDURE — 99214 OFFICE O/P EST MOD 30 MIN: CPT | Mod: 25

## 2023-11-14 PROCEDURE — 95251 CONT GLUC MNTR ANALYSIS I&R: CPT

## 2023-11-15 ENCOUNTER — APPOINTMENT (OUTPATIENT)
Dept: MRI IMAGING | Facility: CLINIC | Age: 53
End: 2023-11-15
Payer: MEDICAID

## 2023-11-15 ENCOUNTER — OUTPATIENT (OUTPATIENT)
Dept: OUTPATIENT SERVICES | Facility: HOSPITAL | Age: 53
LOS: 1 days | End: 2023-11-15
Payer: MEDICAID

## 2023-11-15 ENCOUNTER — NON-APPOINTMENT (OUTPATIENT)
Age: 53
End: 2023-11-15

## 2023-11-15 DIAGNOSIS — Z90.722 ACQUIRED ABSENCE OF OVARIES, BILATERAL: Chronic | ICD-10-CM

## 2023-11-15 DIAGNOSIS — Z90.10 ACQUIRED ABSENCE OF UNSPECIFIED BREAST AND NIPPLE: Chronic | ICD-10-CM

## 2023-11-15 DIAGNOSIS — L40.50 ARTHROPATHIC PSORIASIS, UNSPECIFIED: ICD-10-CM

## 2023-11-15 LAB
ALBUMIN SERPL ELPH-MCNC: 4.8 G/DL
ALP BLD-CCNC: 116 U/L
ALT SERPL-CCNC: 122 U/L
ANION GAP SERPL CALC-SCNC: 12 MMOL/L
AST SERPL-CCNC: 58 U/L
BILIRUB SERPL-MCNC: 1.5 MG/DL
BUN SERPL-MCNC: 11 MG/DL
CALCIUM SERPL-MCNC: 9.7 MG/DL
CHLORIDE SERPL-SCNC: 101 MMOL/L
CHOLEST SERPL-MCNC: 187 MG/DL
CO2 SERPL-SCNC: 26 MMOL/L
CREAT SERPL-MCNC: 0.58 MG/DL
EGFR: 108 ML/MIN/1.73M2
ESTIMATED AVERAGE GLUCOSE: 249 MG/DL
GLUCOSE SERPL-MCNC: 254 MG/DL
HBA1C MFR BLD HPLC: 10.3 %
HDLC SERPL-MCNC: 43 MG/DL
LDLC SERPL CALC-MCNC: 120 MG/DL
NONHDLC SERPL-MCNC: 143 MG/DL
POTASSIUM SERPL-SCNC: 4.2 MMOL/L
PROT SERPL-MCNC: 6.7 G/DL
SODIUM SERPL-SCNC: 139 MMOL/L
TRIGL SERPL-MCNC: 126 MG/DL

## 2023-11-15 PROCEDURE — 72146 MRI CHEST SPINE W/O DYE: CPT

## 2023-11-15 PROCEDURE — 72146 MRI CHEST SPINE W/O DYE: CPT | Mod: 26

## 2023-11-16 ENCOUNTER — TRANSCRIPTION ENCOUNTER (OUTPATIENT)
Age: 53
End: 2023-11-16

## 2023-11-17 ENCOUNTER — APPOINTMENT (OUTPATIENT)
Dept: ORTHOPEDIC SURGERY | Facility: CLINIC | Age: 53
End: 2023-11-17

## 2023-11-17 ENCOUNTER — TRANSCRIPTION ENCOUNTER (OUTPATIENT)
Age: 53
End: 2023-11-17

## 2023-11-20 ENCOUNTER — TRANSCRIPTION ENCOUNTER (OUTPATIENT)
Age: 53
End: 2023-11-20

## 2023-11-21 ENCOUNTER — TRANSCRIPTION ENCOUNTER (OUTPATIENT)
Age: 53
End: 2023-11-21

## 2023-11-22 ENCOUNTER — APPOINTMENT (OUTPATIENT)
Dept: ENDOCRINOLOGY | Facility: CLINIC | Age: 53
End: 2023-11-22
Payer: MEDICAID

## 2023-11-22 DIAGNOSIS — M26.623 ARTHRALGIA OF BILATERAL TEMPOROMANDIBULAR JOINT: ICD-10-CM

## 2023-11-22 DIAGNOSIS — M26.629 ARTHRALGIA OF TEMPOROMANDIBULAR JOINT,: ICD-10-CM

## 2023-11-22 PROCEDURE — G0108 DIAB MANAGE TRN  PER INDIV: CPT

## 2023-12-01 ENCOUNTER — TRANSCRIPTION ENCOUNTER (OUTPATIENT)
Age: 53
End: 2023-12-01

## 2023-12-05 ENCOUNTER — APPOINTMENT (OUTPATIENT)
Dept: INTERNAL MEDICINE | Facility: CLINIC | Age: 53
End: 2023-12-05
Payer: MEDICAID

## 2023-12-05 VITALS
OXYGEN SATURATION: 97 % | SYSTOLIC BLOOD PRESSURE: 126 MMHG | HEART RATE: 99 BPM | DIASTOLIC BLOOD PRESSURE: 81 MMHG | HEIGHT: 64 IN | BODY MASS INDEX: 36.54 KG/M2 | WEIGHT: 214 LBS | TEMPERATURE: 98.6 F

## 2023-12-05 DIAGNOSIS — E55.9 VITAMIN D DEFICIENCY, UNSPECIFIED: ICD-10-CM

## 2023-12-05 DIAGNOSIS — Z86.39 PERSONAL HISTORY OF OTHER ENDOCRINE, NUTRITIONAL AND METABOLIC DISEASE: ICD-10-CM

## 2023-12-05 DIAGNOSIS — Z86.79 PERSONAL HISTORY OF OTHER DISEASES OF THE CIRCULATORY SYSTEM: ICD-10-CM

## 2023-12-05 DIAGNOSIS — Z87.898 PERSONAL HISTORY OF OTHER SPECIFIED CONDITIONS: ICD-10-CM

## 2023-12-05 DIAGNOSIS — E78.5 HYPERLIPIDEMIA, UNSPECIFIED: ICD-10-CM

## 2023-12-05 DIAGNOSIS — Z78.9 OTHER SPECIFIED HEALTH STATUS: ICD-10-CM

## 2023-12-05 DIAGNOSIS — K76.0 FATTY (CHANGE OF) LIVER, NOT ELSEWHERE CLASSIFIED: ICD-10-CM

## 2023-12-05 DIAGNOSIS — M79.7 FIBROMYALGIA: ICD-10-CM

## 2023-12-05 DIAGNOSIS — E11.65 TYPE 2 DIABETES MELLITUS WITH HYPERGLYCEMIA: ICD-10-CM

## 2023-12-05 DIAGNOSIS — R73.09 OTHER ABNORMAL GLUCOSE: ICD-10-CM

## 2023-12-05 DIAGNOSIS — Z13.0 ENCOUNTER FOR SCREENING FOR OTHER SUSPECTED ENDOCRINE DISORDER: ICD-10-CM

## 2023-12-05 DIAGNOSIS — Z71.85 ENCOUNTER FOR IMMUNIZATION SAFETY COUNSELING: ICD-10-CM

## 2023-12-05 DIAGNOSIS — T88.1XXA OTHER COMPLICATIONS FOLLOWING IMMUNIZATION, NOT ELSEWHERE CLASSIFIED, INITIAL ENCOUNTER: ICD-10-CM

## 2023-12-05 DIAGNOSIS — C50.919 MALIGNANT NEOPLASM OF UNSPECIFIED SITE OF UNSPECIFIED FEMALE BREAST: ICD-10-CM

## 2023-12-05 DIAGNOSIS — Z87.09 PERSONAL HISTORY OF OTHER DISEASES OF THE RESPIRATORY SYSTEM: ICD-10-CM

## 2023-12-05 DIAGNOSIS — Z98.890 OTHER SPECIFIED POSTPROCEDURAL STATES: ICD-10-CM

## 2023-12-05 DIAGNOSIS — M79.605 PAIN IN LEFT LEG: ICD-10-CM

## 2023-12-05 DIAGNOSIS — R79.89 OTHER SPECIFIED ABNORMAL FINDINGS OF BLOOD CHEMISTRY: ICD-10-CM

## 2023-12-05 DIAGNOSIS — Z13.228 ENCOUNTER FOR SCREENING FOR OTHER SUSPECTED ENDOCRINE DISORDER: ICD-10-CM

## 2023-12-05 DIAGNOSIS — R93.89 ABNORMAL FINDINGS ON DIAGNOSTIC IMAGING OF OTHER SPECIFIED BODY STRUCTURES: ICD-10-CM

## 2023-12-05 DIAGNOSIS — Z71.89 OTHER SPECIFIED COUNSELING: ICD-10-CM

## 2023-12-05 DIAGNOSIS — Z88.9 ALLERGY STATUS TO UNSPECIFIED DRUGS, MEDICAMENTS AND BIOLOGICAL SUBSTANCES: ICD-10-CM

## 2023-12-05 DIAGNOSIS — M25.561 PAIN IN RIGHT KNEE: ICD-10-CM

## 2023-12-05 DIAGNOSIS — Z87.39 PERSONAL HISTORY OF OTHER DISEASES OF THE MUSCULOSKELETAL SYSTEM AND CONNECTIVE TISSUE: ICD-10-CM

## 2023-12-05 DIAGNOSIS — R94.6 ABNORMAL RESULTS OF THYROID FUNCTION STUDIES: ICD-10-CM

## 2023-12-05 DIAGNOSIS — Z00.00 ENCOUNTER FOR GENERAL ADULT MEDICAL EXAMINATION W/OUT ABNORMAL FINDINGS: ICD-10-CM

## 2023-12-05 DIAGNOSIS — L29.2 PRURITUS VULVAE: ICD-10-CM

## 2023-12-05 DIAGNOSIS — M54.50 LOW BACK PAIN, UNSPECIFIED: ICD-10-CM

## 2023-12-05 DIAGNOSIS — M25.562 PAIN IN RIGHT KNEE: ICD-10-CM

## 2023-12-05 DIAGNOSIS — J34.89 OTHER SPECIFIED DISORDERS OF NOSE AND NASAL SINUSES: ICD-10-CM

## 2023-12-05 DIAGNOSIS — M54.16 RADICULOPATHY, LUMBAR REGION: ICD-10-CM

## 2023-12-05 DIAGNOSIS — Z11.59 ENCOUNTER FOR SCREENING FOR OTHER VIRAL DISEASES: ICD-10-CM

## 2023-12-05 DIAGNOSIS — Z13.29 ENCOUNTER FOR SCREENING FOR OTHER SUSPECTED ENDOCRINE DISORDER: ICD-10-CM

## 2023-12-05 DIAGNOSIS — Z86.2 PERSONAL HISTORY OF DISEASES OF THE BLOOD AND BLOOD-FORMING ORGANS AND CERTAIN DISORDERS INVOLVING THE IMMUNE MECHANISM: ICD-10-CM

## 2023-12-05 DIAGNOSIS — Z11.1 ENCOUNTER FOR SCREENING FOR RESPIRATORY TUBERCULOSIS: ICD-10-CM

## 2023-12-05 DIAGNOSIS — D64.9 ANEMIA, UNSPECIFIED: ICD-10-CM

## 2023-12-05 DIAGNOSIS — F32.A DEPRESSION, UNSPECIFIED: ICD-10-CM

## 2023-12-05 DIAGNOSIS — E66.9 OBESITY, UNSPECIFIED: ICD-10-CM

## 2023-12-05 PROCEDURE — 36415 COLL VENOUS BLD VENIPUNCTURE: CPT

## 2023-12-05 PROCEDURE — 93000 ELECTROCARDIOGRAM COMPLETE: CPT

## 2023-12-05 PROCEDURE — 99396 PREV VISIT EST AGE 40-64: CPT | Mod: 25

## 2023-12-05 RX ORDER — BLOOD SUGAR DIAGNOSTIC
STRIP MISCELLANEOUS TWICE DAILY
Qty: 1 | Refills: 11 | Status: DISCONTINUED | COMMUNITY
Start: 2022-11-30 | End: 2023-12-05

## 2023-12-05 RX ORDER — LETROZOLE TABLETS 2.5 MG/1
2.5 TABLET, FILM COATED ORAL
Qty: 90 | Refills: 0 | Status: DISCONTINUED | COMMUNITY
Start: 2017-04-12 | End: 2023-12-05

## 2023-12-05 RX ORDER — BLOOD-GLUCOSE METER
EACH MISCELLANEOUS
Qty: 100 | Refills: 11 | Status: DISCONTINUED | COMMUNITY
Start: 2022-11-30 | End: 2023-12-05

## 2023-12-05 RX ORDER — DICLOFENAC SODIUM 100 MG/1
100 TABLET, FILM COATED, EXTENDED RELEASE ORAL
Qty: 30 | Refills: 1 | Status: DISCONTINUED | COMMUNITY
Start: 2023-05-12 | End: 2023-12-05

## 2023-12-05 RX ORDER — TRIAMCINOLONE ACETONIDE 1 MG/G
0.1 OINTMENT TOPICAL
Qty: 1 | Refills: 2 | Status: DISCONTINUED | COMMUNITY
Start: 2022-12-27 | End: 2023-12-05

## 2023-12-05 RX ORDER — SODIUM SULFATE, MAGNESIUM SULFATE, AND POTASSIUM CHLORIDE 17.75; 2.7; 2.25 G/1; G/1; G/1
1479-225-188 TABLET ORAL
Qty: 1 | Refills: 0 | Status: DISCONTINUED | COMMUNITY
Start: 2021-12-03 | End: 2023-12-05

## 2023-12-05 RX ORDER — CYCLOBENZAPRINE HYDROCHLORIDE 5 MG/1
5 TABLET, FILM COATED ORAL
Qty: 15 | Refills: 0 | Status: DISCONTINUED | COMMUNITY
Start: 2023-03-10 | End: 2023-12-05

## 2023-12-05 RX ORDER — ERGOCALCIFEROL 1.25 MG/1
1.25 MG CAPSULE, LIQUID FILLED ORAL
Qty: 4 | Refills: 0 | Status: DISCONTINUED | COMMUNITY
Start: 2017-06-20 | End: 2023-12-05

## 2023-12-05 RX ORDER — BLOOD-GLUCOSE METER
W/DEVICE EACH MISCELLANEOUS
Qty: 1 | Refills: 0 | Status: DISCONTINUED | COMMUNITY
Start: 2022-11-30 | End: 2023-12-05

## 2023-12-05 RX ORDER — INSULIN GLARGINE 100 [IU]/ML
100 INJECTION, SOLUTION SUBCUTANEOUS AT BEDTIME
Qty: 1 | Refills: 2 | Status: DISCONTINUED | COMMUNITY
Start: 2023-11-15 | End: 2023-12-05

## 2023-12-05 RX ORDER — UBIDECARENONE/VIT E ACET 100MG-5
50 MCG CAPSULE ORAL
Refills: 0 | Status: ACTIVE | COMMUNITY

## 2023-12-05 RX ORDER — FLUTICASONE PROPIONATE 50 UG/1
50 SPRAY, METERED NASAL DAILY
Qty: 1 | Refills: 3 | Status: DISCONTINUED | COMMUNITY
Start: 2023-01-30 | End: 2023-12-05

## 2023-12-06 LAB
25(OH)D3 SERPL-MCNC: 32.5 NG/ML
CHOLEST SERPL-MCNC: 191 MG/DL
CREAT SPEC-SCNC: 262 MG/DL
HDLC SERPL-MCNC: 43 MG/DL
LDLC SERPL CALC-MCNC: 125 MG/DL
LDLC SERPL DIRECT ASSAY-MCNC: 121 MG/DL
MICROALBUMIN 24H UR DL<=1MG/L-MCNC: 4.7 MG/DL
MICROALBUMIN/CREAT 24H UR-RTO: 18 MG/G
NONHDLC SERPL-MCNC: 148 MG/DL
TRIGL SERPL-MCNC: 127 MG/DL

## 2023-12-07 LAB
M TB IFN-G BLD-IMP: NEGATIVE
QUANTIFERON TB PLUS MITOGEN MINUS NIL: 4.16 IU/ML
QUANTIFERON TB PLUS NIL: 0.02 IU/ML
QUANTIFERON TB PLUS TB1 MINUS NIL: 0 IU/ML
QUANTIFERON TB PLUS TB2 MINUS NIL: -0.01 IU/ML

## 2023-12-12 ENCOUNTER — RX RENEWAL (OUTPATIENT)
Age: 53
End: 2023-12-12

## 2023-12-27 ENCOUNTER — TRANSCRIPTION ENCOUNTER (OUTPATIENT)
Age: 53
End: 2023-12-27

## 2023-12-29 ENCOUNTER — TRANSCRIPTION ENCOUNTER (OUTPATIENT)
Age: 53
End: 2023-12-29

## 2024-01-04 ENCOUNTER — APPOINTMENT (OUTPATIENT)
Dept: OBGYN | Facility: CLINIC | Age: 54
End: 2024-01-04
Payer: MEDICAID

## 2024-01-04 VITALS
HEIGHT: 64 IN | BODY MASS INDEX: 36.54 KG/M2 | HEART RATE: 116 BPM | SYSTOLIC BLOOD PRESSURE: 135 MMHG | DIASTOLIC BLOOD PRESSURE: 88 MMHG | WEIGHT: 214 LBS

## 2024-01-04 DIAGNOSIS — Z01.419 ENCOUNTER FOR GYNECOLOGICAL EXAMINATION (GENERAL) (ROUTINE) W/OUT ABNORMAL FINDINGS: ICD-10-CM

## 2024-01-04 PROCEDURE — 99396 PREV VISIT EST AGE 40-64: CPT

## 2024-01-04 RX ORDER — FLUTICASONE PROPIONATE 50 UG/1
50 SPRAY, METERED NASAL DAILY
Qty: 1 | Refills: 0 | Status: DISCONTINUED | COMMUNITY
Start: 2022-11-30 | End: 2024-01-04

## 2024-01-04 RX ORDER — BLOOD SUGAR DIAGNOSTIC
STRIP MISCELLANEOUS
Qty: 100 | Refills: 11 | Status: DISCONTINUED | COMMUNITY
Start: 2022-09-13 | End: 2024-01-04

## 2024-01-04 RX ORDER — FLASH GLUCOSE SCANNING READER
EACH MISCELLANEOUS
Qty: 1 | Refills: 0 | Status: DISCONTINUED | COMMUNITY
Start: 2022-12-07 | End: 2024-01-04

## 2024-01-04 RX ORDER — DICLOFENAC SODIUM 1 %
1 KIT TOPICAL DAILY
Qty: 2 | Refills: 2 | Status: DISCONTINUED | COMMUNITY
Start: 2020-12-10 | End: 2024-01-04

## 2024-01-04 RX ORDER — NYSTATIN 100000 U/G
100000 OINTMENT TOPICAL 3 TIMES DAILY
Qty: 1 | Refills: 2 | Status: DISCONTINUED | COMMUNITY
Start: 2022-12-27 | End: 2024-01-04

## 2024-01-04 RX ORDER — PEN NEEDLE, DIABETIC 29 G X1/2"
32G X 4 MM NEEDLE, DISPOSABLE MISCELLANEOUS
Qty: 1 | Refills: 3 | Status: DISCONTINUED | COMMUNITY
Start: 2023-11-15 | End: 2024-01-04

## 2024-01-04 RX ORDER — ACETAMINOPHEN 500 MG
500 TABLET ORAL
Refills: 0 | Status: ACTIVE | COMMUNITY

## 2024-01-04 RX ORDER — TACROLIMUS 1 MG/G
0.1 OINTMENT TOPICAL
Qty: 30 | Refills: 0 | Status: DISCONTINUED | COMMUNITY
Start: 2022-04-28 | End: 2024-01-04

## 2024-01-04 RX ORDER — LANCETS
EACH MISCELLANEOUS
Qty: 100 | Refills: 11 | Status: DISCONTINUED | COMMUNITY
Start: 2022-09-13 | End: 2024-01-04

## 2024-01-04 RX ORDER — NYSTATIN AND TRIAMCINOLONE ACETONIDE 100000; 1 [USP'U]/G; MG/G
100000-0.1 OINTMENT TOPICAL
Qty: 1 | Refills: 2 | Status: DISCONTINUED | COMMUNITY
Start: 2022-12-21 | End: 2024-01-04

## 2024-01-04 RX ORDER — IXEKIZUMAB 80 MG/ML
80 INJECTION, SOLUTION SUBCUTANEOUS
Qty: 2 | Refills: 0 | Status: DISCONTINUED | COMMUNITY
Start: 2023-06-23 | End: 2024-01-04

## 2024-01-04 RX ORDER — 70%ISOPROPYL ALCOHOL 0.7 ML/ML
70 SWAB TOPICAL
Qty: 100 | Refills: 11 | Status: DISCONTINUED | COMMUNITY
Start: 2022-09-13 | End: 2024-01-04

## 2024-01-04 RX ORDER — FLASH GLUCOSE SENSOR
KIT MISCELLANEOUS
Qty: 1 | Refills: 11 | Status: DISCONTINUED | COMMUNITY
Start: 2022-12-07 | End: 2024-01-04

## 2024-01-04 RX ORDER — BLOOD-GLUCOSE METER
W/DEVICE EACH MISCELLANEOUS
Qty: 1 | Refills: 0 | Status: DISCONTINUED | COMMUNITY
Start: 2022-09-13 | End: 2024-01-04

## 2024-01-04 NOTE — PHYSICAL EXAM
[Chaperone Present] : A chaperone was present in the examining room during all aspects of the physical examination [Appropriately responsive] : appropriately responsive [Alert] : alert [No Acute Distress] : no acute distress [No Lymphadenopathy] : no lymphadenopathy [Soft] : soft [Non-tender] : non-tender [Non-distended] : non-distended [No HSM] : No HSM [No Lesions] : no lesions [No Mass] : no mass [Oriented x3] : oriented x3 [Examination Of The Breasts] : a normal appearance [___] : a [unfilled] ~Ucm mastectomy scar [Breast Reconstruction Right] : breast reconstruction [No Masses] : no breast masses were palpable [Labia Majora] : normal [Labia Minora] : normal [Normal] : normal [Uterine Adnexae] : absent

## 2024-01-08 ENCOUNTER — TRANSCRIPTION ENCOUNTER (OUTPATIENT)
Age: 54
End: 2024-01-08

## 2024-01-09 ENCOUNTER — TRANSCRIPTION ENCOUNTER (OUTPATIENT)
Age: 54
End: 2024-01-09

## 2024-01-28 ENCOUNTER — RX RENEWAL (OUTPATIENT)
Age: 54
End: 2024-01-28

## 2024-01-28 RX ORDER — DULOXETINE HYDROCHLORIDE 40 MG/1
40 CAPSULE, DELAYED RELEASE PELLETS ORAL
Qty: 90 | Refills: 1 | Status: ACTIVE | COMMUNITY
Start: 2023-10-29 | End: 1900-01-01

## 2024-02-06 ENCOUNTER — RX RENEWAL (OUTPATIENT)
Age: 54
End: 2024-02-06

## 2024-02-06 RX ORDER — TIZANIDINE 2 MG/1
2 TABLET ORAL
Qty: 30 | Refills: 2 | Status: ACTIVE | COMMUNITY
Start: 2021-11-30 | End: 1900-01-01

## 2024-02-08 ENCOUNTER — APPOINTMENT (OUTPATIENT)
Dept: RHEUMATOLOGY | Facility: CLINIC | Age: 54
End: 2024-02-08

## 2024-02-13 RX ORDER — SEMAGLUTIDE 0.68 MG/ML
2 INJECTION, SOLUTION SUBCUTANEOUS
Qty: 3 | Refills: 2 | Status: DISCONTINUED | COMMUNITY
Start: 2023-04-19 | End: 2024-02-13

## 2024-02-14 ENCOUNTER — APPOINTMENT (OUTPATIENT)
Dept: RHEUMATOLOGY | Facility: CLINIC | Age: 54
End: 2024-02-14
Payer: MEDICAID

## 2024-02-14 VITALS — BODY MASS INDEX: 36.54 KG/M2 | WEIGHT: 214 LBS | HEIGHT: 64 IN | OXYGEN SATURATION: 96 %

## 2024-02-14 PROCEDURE — 99214 OFFICE O/P EST MOD 30 MIN: CPT

## 2024-02-14 NOTE — HISTORY OF PRESENT ILLNESS
[___ Month(s) Ago] : [unfilled] month(s) ago [FreeTextEntry1] : Back pain waxing and waning, now a bit improved.

## 2024-02-15 ENCOUNTER — TRANSCRIPTION ENCOUNTER (OUTPATIENT)
Age: 54
End: 2024-02-15

## 2024-02-15 LAB
ALBUMIN SERPL ELPH-MCNC: 4.8 G/DL
ALP BLD-CCNC: 94 U/L
ALT SERPL-CCNC: 85 U/L
ANION GAP SERPL CALC-SCNC: 11 MMOL/L
AST SERPL-CCNC: 72 U/L
BASOPHILS # BLD AUTO: 0.07 K/UL
BASOPHILS NFR BLD AUTO: 0.9 %
BILIRUB SERPL-MCNC: 0.7 MG/DL
BUN SERPL-MCNC: 11 MG/DL
CALCIUM SERPL-MCNC: 10.3 MG/DL
CHLORIDE SERPL-SCNC: 103 MMOL/L
CO2 SERPL-SCNC: 27 MMOL/L
CREAT SERPL-MCNC: 0.6 MG/DL
CRP SERPL-MCNC: 5 MG/L
EGFR: 107 ML/MIN/1.73M2
EOSINOPHIL # BLD AUTO: 0.21 K/UL
EOSINOPHIL NFR BLD AUTO: 2.7 %
ERYTHROCYTE [SEDIMENTATION RATE] IN BLOOD BY WESTERGREN METHOD: 21 MM/HR
GLUCOSE SERPL-MCNC: 188 MG/DL
HCT VFR BLD CALC: 46 %
HGB BLD-MCNC: 14.5 G/DL
IMM GRANULOCYTES NFR BLD AUTO: 0.4 %
LYMPHOCYTES # BLD AUTO: 2.4 K/UL
LYMPHOCYTES NFR BLD AUTO: 30.7 %
MAN DIFF?: NORMAL
MCHC RBC-ENTMCNC: 29.6 PG
MCHC RBC-ENTMCNC: 31.5 GM/DL
MCV RBC AUTO: 93.9 FL
MONOCYTES # BLD AUTO: 0.59 K/UL
MONOCYTES NFR BLD AUTO: 7.5 %
NEUTROPHILS # BLD AUTO: 4.53 K/UL
NEUTROPHILS NFR BLD AUTO: 57.8 %
PLATELET # BLD AUTO: 292 K/UL
POTASSIUM SERPL-SCNC: 4.3 MMOL/L
PROT SERPL-MCNC: 6.9 G/DL
RBC # BLD: 4.9 M/UL
RBC # FLD: 12.1 %
SODIUM SERPL-SCNC: 140 MMOL/L
WBC # FLD AUTO: 7.83 K/UL

## 2024-02-21 RX ORDER — TIRZEPATIDE 5 MG/.5ML
5 INJECTION, SOLUTION SUBCUTANEOUS
Qty: 1 | Refills: 0 | Status: COMPLETED | COMMUNITY
Start: 2023-11-16 | End: 2024-03-11

## 2024-03-05 ENCOUNTER — APPOINTMENT (OUTPATIENT)
Dept: ENDOCRINOLOGY | Facility: CLINIC | Age: 54
End: 2024-03-05
Payer: MEDICAID

## 2024-03-05 VITALS
TEMPERATURE: 97.3 F | HEIGHT: 64 IN | DIASTOLIC BLOOD PRESSURE: 85 MMHG | OXYGEN SATURATION: 94 % | HEART RATE: 110 BPM | WEIGHT: 214 LBS | SYSTOLIC BLOOD PRESSURE: 135 MMHG | BODY MASS INDEX: 36.54 KG/M2 | RESPIRATION RATE: 14 BRPM

## 2024-03-05 DIAGNOSIS — E11.9 TYPE 2 DIABETES MELLITUS W/OUT COMPLICATIONS: ICD-10-CM

## 2024-03-05 DIAGNOSIS — E07.9 DISORDER OF THYROID, UNSPECIFIED: ICD-10-CM

## 2024-03-05 LAB
ALBUMIN SERPL ELPH-MCNC: 4.7 G/DL
ALP BLD-CCNC: 81 U/L
ALT SERPL-CCNC: 83 U/L
ANION GAP SERPL CALC-SCNC: 13 MMOL/L
AST SERPL-CCNC: 71 U/L
BASOPHILS # BLD AUTO: 0.07 K/UL
BASOPHILS NFR BLD AUTO: 1.1 %
BILIRUB SERPL-MCNC: 1.2 MG/DL
BUN SERPL-MCNC: 10 MG/DL
CALCIUM SERPL-MCNC: 9.7 MG/DL
CHLORIDE SERPL-SCNC: 105 MMOL/L
CHOLEST SERPL-MCNC: 182 MG/DL
CO2 SERPL-SCNC: 22 MMOL/L
CREAT SERPL-MCNC: 0.63 MG/DL
CREAT SPEC-SCNC: 198 MG/DL
EGFR: 106 ML/MIN/1.73M2
EOSINOPHIL # BLD AUTO: 0.18 K/UL
EOSINOPHIL NFR BLD AUTO: 2.7 %
ESTIMATED AVERAGE GLUCOSE: 126 MG/DL
GLUCOSE SERPL-MCNC: 130 MG/DL
HBA1C MFR BLD HPLC: 6 %
HCT VFR BLD CALC: 44.4 %
HDLC SERPL-MCNC: 51 MG/DL
HGB BLD-MCNC: 14.3 G/DL
IMM GRANULOCYTES NFR BLD AUTO: 0.5 %
LDLC SERPL CALC-MCNC: 110 MG/DL
LYMPHOCYTES # BLD AUTO: 2.56 K/UL
LYMPHOCYTES NFR BLD AUTO: 38.5 %
MAN DIFF?: NORMAL
MCHC RBC-ENTMCNC: 29.4 PG
MCHC RBC-ENTMCNC: 32.2 GM/DL
MCV RBC AUTO: 91.2 FL
MICROALBUMIN 24H UR DL<=1MG/L-MCNC: 2.9 MG/DL
MICROALBUMIN/CREAT 24H UR-RTO: 15 MG/G
MONOCYTES # BLD AUTO: 0.51 K/UL
MONOCYTES NFR BLD AUTO: 7.7 %
NEUTROPHILS # BLD AUTO: 3.3 K/UL
NEUTROPHILS NFR BLD AUTO: 49.5 %
NONHDLC SERPL-MCNC: 131 MG/DL
PLATELET # BLD AUTO: 252 K/UL
POTASSIUM SERPL-SCNC: 4.2 MMOL/L
PROT SERPL-MCNC: 6.8 G/DL
RBC # BLD: 4.87 M/UL
RBC # FLD: 12.5 %
SODIUM SERPL-SCNC: 141 MMOL/L
TRIGL SERPL-MCNC: 117 MG/DL
TSH SERPL-ACNC: 0.92 UIU/ML
WBC # FLD AUTO: 6.65 K/UL

## 2024-03-05 PROCEDURE — 99213 OFFICE O/P EST LOW 20 MIN: CPT

## 2024-03-05 NOTE — PHYSICAL EXAM
[Alert] : alert [Well Nourished] : well nourished [Healthy Appearance] : healthy appearance [Obese] : obese [No Acute Distress] : no acute distress [Well Developed] : well developed [Normal Voice/Communication] : normal voice communication [Normal Sclera/Conjunctiva] : normal sclera/conjunctiva [No Proptosis] : no proptosis [No Neck Mass] : no neck mass was observed [No LAD] : no lymphadenopathy [Supple] : the neck was supple [Thyroid Not Enlarged] : the thyroid was not enlarged [No Thyroid Nodules] : no palpable thyroid nodules [No Respiratory Distress] : no respiratory distress [Normal Affect] : the affect was normal [Normal Insight/Judgement] : insight and judgment were intact [Normal Mood] : the mood was normal

## 2024-03-05 NOTE — REVIEW OF SYSTEMS
[Fatigue] : fatigue [Constipation] : constipation [All other systems negative] : All other systems negative [Negative] : Psychiatric [FreeTextEntry7] : gas [FreeTextEntry4] : pain with swallowing.

## 2024-03-07 ENCOUNTER — TRANSCRIPTION ENCOUNTER (OUTPATIENT)
Age: 54
End: 2024-03-07

## 2024-03-07 DIAGNOSIS — M79.672 PAIN IN LEFT FOOT: ICD-10-CM

## 2024-03-11 ENCOUNTER — APPOINTMENT (OUTPATIENT)
Dept: RADIOLOGY | Facility: CLINIC | Age: 54
End: 2024-03-11
Payer: MEDICAID

## 2024-03-11 ENCOUNTER — RESULT REVIEW (OUTPATIENT)
Age: 54
End: 2024-03-11

## 2024-03-11 ENCOUNTER — OUTPATIENT (OUTPATIENT)
Dept: OUTPATIENT SERVICES | Facility: HOSPITAL | Age: 54
LOS: 1 days | End: 2024-03-11
Payer: MEDICAID

## 2024-03-11 ENCOUNTER — APPOINTMENT (OUTPATIENT)
Dept: ULTRASOUND IMAGING | Facility: CLINIC | Age: 54
End: 2024-03-11
Payer: MEDICAID

## 2024-03-11 DIAGNOSIS — Z00.8 ENCOUNTER FOR OTHER GENERAL EXAMINATION: ICD-10-CM

## 2024-03-11 DIAGNOSIS — Z90.10 ACQUIRED ABSENCE OF UNSPECIFIED BREAST AND NIPPLE: Chronic | ICD-10-CM

## 2024-03-11 DIAGNOSIS — M79.672 PAIN IN LEFT FOOT: ICD-10-CM

## 2024-03-11 DIAGNOSIS — Z90.722 ACQUIRED ABSENCE OF OVARIES, BILATERAL: Chronic | ICD-10-CM

## 2024-03-11 PROCEDURE — 73630 X-RAY EXAM OF FOOT: CPT

## 2024-03-11 PROCEDURE — 76536 US EXAM OF HEAD AND NECK: CPT | Mod: 26

## 2024-03-11 PROCEDURE — 76536 US EXAM OF HEAD AND NECK: CPT

## 2024-03-11 PROCEDURE — 73630 X-RAY EXAM OF FOOT: CPT | Mod: 26,LT

## 2024-03-13 PROBLEM — E11.9 DIABETES MELLITUS: Status: ACTIVE | Noted: 2022-09-01

## 2024-03-13 NOTE — ADDENDUM
[FreeTextEntry1] :  By signing my name below, I, Franklin Ritchie, attest that this document has been prepared under the direction and in the presence of Dr. Silvestre.  I, Troy Silvestre MD, personally performed the services described in this documentation. All medical record entries made by the scribe were at my discretion and in my presence. I have reviewed the chart and discharge instructions (if applicable) and agree that the record reflects my personal permanence and is accurate and complete.

## 2024-03-13 NOTE — HISTORY OF PRESENT ILLNESS
[FreeTextEntry1] : CC: Thyroid follow-up This is a 53-year-old female with type 2 diabetes mellitus, thyroid calcification, with a history of breast cancer status post right mastectomy, hepatic cyst, fatty liver, splenic lesions, anemia, psoriatic arthritis, fibromyalgia, here for follow-up. Diabetes was diagnosed in August 2022 when HgbA1c was found to be 6.9%.  She is on Mounjaro 5.0 mg weekly. She was previously on Ozempic but was discontinued due to constipation.  Last ophthalmology exam was in August 2023. Denies retinopathy.  Denies nephropathy.  Denies neuropathy.  She is not on a statin. She is not on an ACE inhibitor or ARB.  Freestyle bandar CGM downloaded, interpreted, and reviewed. See scanned report below.  CT chest from August 2021 showed unchanged subcentimeter calcified left thyroid nodule. Thyroid ultrasound from December 27, 2021 showed left 0.9 x 0.6 x 0.3 cm coarse calcification in the lower lobe, unchanged compared to prior. No history of radiation therapy to the head or neck. No family history of thyroid cancer. No obstructive symptoms. Thyroid antibodies are negative.

## 2024-03-13 NOTE — ASSESSMENT
[FreeTextEntry1] : This is a 53-year-old female with type 2 diabetes mellitus, thyroid calcification, with a history of breast cancer status post right mastectomy, hepatic cyst, fatty liver, splenic lesions, anemia, psoriatic arthritis, fibromyalgia, here for follow-up. 1. Type 2 diabetes Diabetes was diagnosed in August 2022 when HgbA1c was found to be 6.9%.   Hemoglobin A1c is now 6%. Freestyle Parth CGM downloaded, interpreted and reviewed. Date Range: 02/21/2024 - 03/05/2024.  Target range 89%, high 11%, very high 11%, low 0%, very low 0%, average glucose 149 mg/dL, GMI 6.9%, glucose variability 17.5%.  She is on Mounjaro 5 mg weekly. Last ophthalmology exam was in August 2023. Denies retinopathy. Denies nephropathy. Normal urine microalbumin.  Denies neuropathy. She is not on a statin. She is not on an Ace inhibitor or ARB. 2. Thyroid calcification Thyroid ultrasound from July 2022 showed Coarse calcification identified within the lower pole of the left lobe not significantly changed from previous exam. No other cystic or solid masses or calcifications were seen. I spoke with radiologist Dr. Matias in July 2022 and reports it looks benign. No further ultrasound needed.

## 2024-03-18 ENCOUNTER — RX RENEWAL (OUTPATIENT)
Age: 54
End: 2024-03-18

## 2024-04-08 ENCOUNTER — RX RENEWAL (OUTPATIENT)
Age: 54
End: 2024-04-08

## 2024-04-22 RX ORDER — BLOOD-GLUCOSE SENSOR
EACH MISCELLANEOUS
Qty: 2 | Refills: 6 | Status: ACTIVE | COMMUNITY
Start: 2023-01-04 | End: 1900-01-01

## 2024-05-10 RX ORDER — SECUKINUMAB 150 MG/ML
150 INJECTION SUBCUTANEOUS
Qty: 1 | Refills: 5 | Status: ACTIVE | COMMUNITY
Start: 2021-10-29

## 2024-05-14 ENCOUNTER — APPOINTMENT (OUTPATIENT)
Dept: RHEUMATOLOGY | Facility: CLINIC | Age: 54
End: 2024-05-14
Payer: MEDICARE

## 2024-05-14 VITALS
SYSTOLIC BLOOD PRESSURE: 128 MMHG | RESPIRATION RATE: 15 BRPM | DIASTOLIC BLOOD PRESSURE: 82 MMHG | HEIGHT: 64 IN | HEART RATE: 106 BPM | WEIGHT: 216 LBS | OXYGEN SATURATION: 97 % | BODY MASS INDEX: 36.88 KG/M2

## 2024-05-14 PROCEDURE — 99214 OFFICE O/P EST MOD 30 MIN: CPT

## 2024-05-14 RX ORDER — TIRZEPATIDE 7.5 MG/.5ML
7.5 INJECTION, SOLUTION SUBCUTANEOUS
Qty: 1 | Refills: 1 | Status: ACTIVE | COMMUNITY
Start: 2024-03-18 | End: 2024-07-09

## 2024-05-14 NOTE — HISTORY OF PRESENT ILLNESS
[___ Month(s) Ago] : [unfilled] month(s) ago [FreeTextEntry1] : Everything hurts a lot now that the weather has been so rainy. Feels that she is getting sick all the time - two viral URIs back to back. Skin has cleared completely.

## 2024-05-14 NOTE — PHYSICAL EXAM
[General Appearance - Alert] : alert [General Appearance - In No Acute Distress] : in no acute distress [General Appearance - Well Nourished] : well nourished [General Appearance - Well Developed] : well developed [Sclera] : the sclera and conjunctiva were normal [PERRL With Normal Accommodation] : pupils were equal in size, round, and reactive to light [Examination Of The Oral Cavity] : the lips and gums were normal [Oropharynx] : the oropharynx was normal [] : no respiratory distress [Respiration, Rhythm And Depth] : normal respiratory rhythm and effort [Auscultation Breath Sounds / Voice Sounds] : lungs were clear to auscultation bilaterally [Heart Sounds] : normal S1 and S2 [Heart Rate And Rhythm] : heart rate was normal and rhythm regular [Edema] : there was no peripheral edema [Bowel Sounds] : normal bowel sounds [Abdomen Soft] : soft [FreeTextEntry1] : no swelling/warmth/erythema of the joitns of the UE/LE

## 2024-05-14 NOTE — ASSESSMENT
[FreeTextEntry1] : 54 year old female with psoriasis and PsA  1. PsA: Axial involvement per imaging (PET/CT) and has active skin disease and erosive arthritis. much improved on Cosentyx but still with persistent skin and joint/tendon symptoms. No signs of synovitis on exam. FM possibly confounding. Symptoms have been stable on Cosentyx 300mg/month thus discussed continuing the same for now. Repeat labs today.    2. Abnormal LFTs: f/u hepatology. given improvement plan for repeat scan  3. Psoriasis: skin lesions have resolved   Follow up in 3 months.

## 2024-05-15 ENCOUNTER — TRANSCRIPTION ENCOUNTER (OUTPATIENT)
Age: 54
End: 2024-05-15

## 2024-05-15 LAB
ALBUMIN SERPL ELPH-MCNC: 4.8 G/DL
ALP BLD-CCNC: 85 U/L
ALT SERPL-CCNC: 80 U/L
ANION GAP SERPL CALC-SCNC: 12 MMOL/L
AST SERPL-CCNC: 65 U/L
BASOPHILS # BLD AUTO: 0.05 K/UL
BASOPHILS NFR BLD AUTO: 0.7 %
BILIRUB SERPL-MCNC: 0.9 MG/DL
BUN SERPL-MCNC: 8 MG/DL
CALCIUM SERPL-MCNC: 10 MG/DL
CHLORIDE SERPL-SCNC: 104 MMOL/L
CO2 SERPL-SCNC: 27 MMOL/L
CREAT SERPL-MCNC: 0.77 MG/DL
CRP SERPL-MCNC: 4 MG/L
DEPRECATED KAPPA LC FREE/LAMBDA SER: 2.4 RATIO
EGFR: 92 ML/MIN/1.73M2
EOSINOPHIL # BLD AUTO: 0.17 K/UL
EOSINOPHIL NFR BLD AUTO: 2.3 %
ERYTHROCYTE [SEDIMENTATION RATE] IN BLOOD BY WESTERGREN METHOD: 8 MM/HR
GLUCOSE SERPL-MCNC: 102 MG/DL
HCT VFR BLD CALC: 43.6 %
HGB BLD-MCNC: 14.1 G/DL
IGA SER QL IEP: 47 MG/DL
IGG SER QL IEP: 599 MG/DL
IGM SER QL IEP: 46 MG/DL
IMM GRANULOCYTES NFR BLD AUTO: 0.3 %
KAPPA LC CSF-MCNC: 0.42 MG/DL
KAPPA LC SERPL-MCNC: 1.01 MG/DL
LYMPHOCYTES # BLD AUTO: 1.85 K/UL
LYMPHOCYTES NFR BLD AUTO: 25 %
MAN DIFF?: NORMAL
MCHC RBC-ENTMCNC: 29.1 PG
MCHC RBC-ENTMCNC: 32.3 GM/DL
MCV RBC AUTO: 89.9 FL
MONOCYTES # BLD AUTO: 0.55 K/UL
MONOCYTES NFR BLD AUTO: 7.4 %
NEUTROPHILS # BLD AUTO: 4.75 K/UL
NEUTROPHILS NFR BLD AUTO: 64.3 %
PLATELET # BLD AUTO: 254 K/UL
POTASSIUM SERPL-SCNC: 4.5 MMOL/L
PROT SERPL-MCNC: 6.8 G/DL
RBC # BLD: 4.85 M/UL
RBC # FLD: 12.6 %
SODIUM SERPL-SCNC: 143 MMOL/L
WBC # FLD AUTO: 7.39 K/UL

## 2024-05-16 ENCOUNTER — TRANSCRIPTION ENCOUNTER (OUTPATIENT)
Age: 54
End: 2024-05-16

## 2024-05-22 RX ORDER — DICLOFENAC SODIUM 100 MG/1
100 TABLET, FILM COATED, EXTENDED RELEASE ORAL DAILY
Qty: 30 | Refills: 0 | Status: ACTIVE | COMMUNITY
Start: 2023-10-27 | End: 1900-01-01

## 2024-06-09 NOTE — END OF VISIT
[Time Spent: ___ minutes] : I have spent [unfilled] minutes of time on the encounter. pt is 28 weeks pregnant, c/o vaginal discharge and itching x2 days. pt denies abd pain/vaginal bleeding.

## 2024-06-12 ENCOUNTER — TRANSCRIPTION ENCOUNTER (OUTPATIENT)
Age: 54
End: 2024-06-12

## 2024-06-12 ENCOUNTER — APPOINTMENT (OUTPATIENT)
Dept: PEDIATRIC ALLERGY IMMUNOLOGY | Facility: CLINIC | Age: 54
End: 2024-06-12
Payer: MEDICARE

## 2024-06-12 VITALS
HEART RATE: 106 BPM | DIASTOLIC BLOOD PRESSURE: 78 MMHG | OXYGEN SATURATION: 96 % | WEIGHT: 214 LBS | SYSTOLIC BLOOD PRESSURE: 108 MMHG | HEIGHT: 64 IN | BODY MASS INDEX: 36.54 KG/M2

## 2024-06-12 DIAGNOSIS — L40.50 ARTHROPATHIC PSORIASIS, UNSPECIFIED: ICD-10-CM

## 2024-06-12 DIAGNOSIS — D80.1 NONFAMILIAL HYPOGAMMAGLOBULINEMIA: ICD-10-CM

## 2024-06-12 DIAGNOSIS — Z13.228 ENCOUNTER FOR SCREENING FOR OTHER SUSPECTED ENDOCRINE DISORDER: ICD-10-CM

## 2024-06-12 DIAGNOSIS — Z13.0 ENCOUNTER FOR SCREENING FOR OTHER SUSPECTED ENDOCRINE DISORDER: ICD-10-CM

## 2024-06-12 DIAGNOSIS — Z13.29 ENCOUNTER FOR SCREENING FOR OTHER SUSPECTED ENDOCRINE DISORDER: ICD-10-CM

## 2024-06-12 DIAGNOSIS — Z85.3 PERSONAL HISTORY OF MALIGNANT NEOPLASM OF BREAST: ICD-10-CM

## 2024-06-12 DIAGNOSIS — Z80.0 FAMILY HISTORY OF MALIGNANT NEOPLASM OF DIGESTIVE ORGANS: ICD-10-CM

## 2024-06-12 DIAGNOSIS — R76.8 OTHER SPECIFIED ABNORMAL IMMUNOLOGICAL FINDINGS IN SERUM: ICD-10-CM

## 2024-06-12 LAB
BASOPHILS # BLD AUTO: 0.07 K/UL
BASOPHILS NFR BLD AUTO: 1 %
COVID-19 NUCLEOCAPSID  GAM ANTIBODY INTERPRETATION: POSITIVE
COVID-19 SPIKE DOMAIN ANTIBODY INTERPRETATION: POSITIVE
EOSINOPHIL # BLD AUTO: 0.16 K/UL
EOSINOPHIL NFR BLD AUTO: 2.2 %
HCT VFR BLD CALC: 42.7 %
HGB BLD-MCNC: 14.5 G/DL
IMM GRANULOCYTES NFR BLD AUTO: 0.3 %
LYMPHOCYTES # BLD AUTO: 2.21 K/UL
LYMPHOCYTES NFR BLD AUTO: 30.3 %
MAN DIFF?: NORMAL
MCHC RBC-ENTMCNC: 29.3 PG
MCHC RBC-ENTMCNC: 34 GM/DL
MCV RBC AUTO: 86.3 FL
MONOCYTES # BLD AUTO: 0.49 K/UL
MONOCYTES NFR BLD AUTO: 6.7 %
NEUTROPHILS # BLD AUTO: 4.35 K/UL
NEUTROPHILS NFR BLD AUTO: 59.5 %
PLATELET # BLD AUTO: 230 K/UL
RBC # BLD: 4.95 M/UL
RBC # FLD: 12.6 %
SARS-COV-2 AB SERPL IA-ACNC: >250 U/ML
SARS-COV-2 AB SERPL QL IA: 4.27 INDEX
WBC # FLD AUTO: 7.3 K/UL

## 2024-06-12 PROCEDURE — 99204 OFFICE O/P NEW MOD 45 MIN: CPT

## 2024-06-13 LAB
C3 SERPL-MCNC: 170 MG/DL
C4 SERPL-MCNC: 27 MG/DL
CD16+CD56+ CELLS # BLD: 216 CELLS/UL
CD16+CD56+ CELLS NFR BLD: 10 %
CD19 CELLS NFR BLD: 235 CELLS/UL
CD3 CELLS # BLD: 1757 CELLS/UL
CD3 CELLS NFR BLD: 78 %
CD3+CD4+ CELLS # BLD: 1257 CELLS/UL
CD3+CD4+ CELLS NFR BLD: 55 %
CD3+CD4+ CELLS/CD3+CD8+ CLL SPEC: 2.48 RATIO
CD3+CD8+ CELLS # SPEC: 506 CELLS/UL
CD3+CD8+ CELLS NFR BLD: 22 %
CELLS.CD3-CD19+/CELLS IN BLOOD: 10 %
CH50 SERPL-MCNC: 83 U/ML
DEPRECATED KAPPA LC FREE/LAMBDA SER: 2.14 RATIO
IGA SER QL IEP: 47 MG/DL
IGE SER-MCNC: 5 KU/L
IGG SER QL IEP: 619 MG/DL
IGG SER QL IEP: 619 MG/DL
IGG1 SER-MCNC: 433 MG/DL
IGG2 SER-MCNC: 54 MG/DL
IGG3 SER-MCNC: 47.1 MG/DL
IGG4 SER-MCNC: 2.2 MG/DL
IGM SER QL IEP: 43 MG/DL
KAPPA LC CSF-MCNC: 0.42 MG/DL
KAPPA LC SERPL-MCNC: 0.9 MG/DL
MEV IGG FLD QL IA: 146 AU/ML
MEV IGG+IGM SER-IMP: POSITIVE
MUV AB SER-ACNC: NEGATIVE
MUV IGG SER QL IA: <5 AU/ML
RUBV IGG FLD-ACNC: 4.36 INDEX
RUBV IGG SER-IMP: POSITIVE
VZV AB TITR SER: POSITIVE
VZV IGG SER IF-ACNC: 3289 INDEX

## 2024-06-15 LAB
CD19 CELLS # BLD: 282 CELLS/UL
CD19 CELLS NFR BLD: 11.4
CD19+CD27+ CELLS # BLD: 80 CELLS/UL
CD19+CD27+IGD+IGM+ CELLS # BLD: 59 CELLS/UL
CD19+CD27+IGD-IGM- CELLS # BLD: 14 CELLS/UL
CD19+CD38+IGM+ CELLS # BLD: 8 CELLS/UL
CD19+CD38+IGM- CELLS # BLD: 1 CELLS/UL
CD20 CELLS # BLD: 282 CELLS/UL
CD20 CELLS/CELLS.CD19 NFR BLD: 99.8
CD21LOW38- CELLS # BLD: 38 CELLS/UL
CD21LOW38- CELLS/CELLS.CD19 NFR BLD: 13.6
CD27 CELLS/CELLS.CD19 NFR BLD: 28.3
CD27+IGD+IGM+/CD19 CELLS NFR BLD: 20.8
CD27+IGD-IGM-/CD19 CELLS NFR BLD: 4.9
CD38+IGM+ CELLS/CELLS.CD19 NFR BLD: 2.9
CD38+IGM- CELLS/CELLS.CD19 NFR BLD: 0.2

## 2024-06-17 ENCOUNTER — RX RENEWAL (OUTPATIENT)
Age: 54
End: 2024-06-17

## 2024-06-17 ENCOUNTER — TRANSCRIPTION ENCOUNTER (OUTPATIENT)
Age: 54
End: 2024-06-17

## 2024-06-17 LAB
C DIPHTHERIAE AB SER QL: 0.13 IU/ML
C TETANI IGG SER-ACNC: 0.32 IU/ML

## 2024-06-17 NOTE — HISTORY OF PRESENT ILLNESS
[de-identified] : Ms. Hughes is a 53 y/o F with hypogammaglobulinemia, who used to be followed by Dr. Loaiza referred to our Immunology Clinic by her Rheum.   She states that she has gotten sick in the last 3 months: She p/w viral cold, cough, sinus pressure, fatigue- tested negative for COVID as per the patient- not tested for serology Had COVID-19 in 2021   She would like to know if her immune system is OK.  Denies pneumonia, recurrent sinusitis, sepsis, skin warts, chronic diarrhea, and chronic LAPs.     She was seen by an [facility] Immunologist- 7 years ago- and recommended to start therapy with SQIG- performed at home- had 3 injections - and she stopped.   She also has a history of metastatic breast cancer in 2000- chemo and right mastectomy-- 2009 relapsed with bone metastases - ER+  s/p  2009- RT and she is on letrozole since then- tested negative for genetic testing- underwent bilateral oophorectomy in 2014 -laparoscopic-   Since then her psoriasis has advanced- skin and joint psoriasis- she was started on 2001-    COVID vaccine- 3 weeks later- spasms- mid-lungs/back- seen by Rheum- fibromyalgia vs arthritis- started on biologics- now she is on cosentyx- 300 mg SC every 4 weeks with good response.    IMM; UTD- except COVID boosters- no flu or pneumonia vaccines Meds: Cosentyx, letrozole, duloxetine, majaro once week  All; Penicillin, nickel, latex   Family HX; Her father with pancreatic cancer- 2001- doing OK  No history of breast cancer- lymphoma  She has a 51 y/o brother known to be healthy   Her last visit with amanda Loaiza was from July 2022- copied here: cellular immune evaluation: normal CBC w/ diff normal T, B, NK cell counts normal lymphocyte proliferation to mitogens and antigens IMPRESSION: -cellular immune evaluation to date shows no obvious numerical defects. The normal lymphocyte proliferation studies suggest relatively intact T & B cell function in a test tube. Suspicion for a cellular immunodeficiency is low at this time. antibody immune evaluation: low IgG2, IgA, IgM, lambda light chain with low normal total IgG, normal IgG1, IgG3, IgG4, kappa light chain non protective vaccine levels to mumps, diphtheria, pneumococcus protective vaccine levels to measles, rubella, varicella, tetanus IMPRESSION: -antibody immune evaluation to date shows low IgG2, IgA, IgM, lambda light chain, with low normal IgG of unclear clinical significance and etiology. Recommend rechecking in 3-6 months to monitor. She also has non protective titers to mumps & diphtheria which may be due to naturally waning immunity. anticipatory guidance reviewed. The negative vaccine levels to pneumococcus (bacteria causing pneumonia) isn't surprising since you are unlikely to have been vaccinated with pneumonia vaccine due to age (vaccine not around when you were a child, not recommended until you're at least 65 years old). Recommend booster with TDAP and MMR then checking titers 4-6 weeks later to document response. At that time, also recheck IgG subsets, IgG, IgA, IgM. if you do not respond adequately to vaccination and/or IgG2 remains low, you may benefit from IgG (aka antibody) replacement therapy. Given your prior history of adverse reaction, subcutaenous route with conservative dose of 400 - 500 mg/kg/month would be a reasonable starting point. Since  you think you had adverse reaction to "Hyqvia" but is unsure, out of abundance of caution, would avoid Hyqvia and other [employer]akeda products (e.g., Cuvitru). Consider weekly or every other weekly SCIG with 20% IgG products from other manufacturers such as Hizentra or Xembify. Otherwise, you has protective titers to a variety of live and inactivated vaccines. Note that the  clinical utility of IgG replacement therapy in the absence of frequent or recurrent infection sis unclear.      complement evaluation  elevated CH50 with normal AH50, MBL  IMPRESSION  -complement evaluation shows elevated CH50 which is likely related to underlying autoimmunity. Recommend rechecking in 3-6 months to monitor. The normal AH50 and MBL are reassuring. Suspicion for complement defect is low at this time.      In summary, pls seriously consider getting the booster vaccinations as recommended, followed by blood work outlined above 4-6 weeks after the vaccination. If booster vaccination doesn't increase your levels into the protective range and/or your IgG2 remains low, you should think about IgG replacement therapy. However, if you are not experiencing frequent or severe infections, IgG replacement may not be all that helpful for you - in other words, don't do the infusions just to address an abnormal lab result.

## 2024-06-17 NOTE — PHYSICAL EXAM

## 2024-06-18 ENCOUNTER — NON-APPOINTMENT (OUTPATIENT)
Age: 54
End: 2024-06-18

## 2024-06-18 LAB — HAEM INFLU B AB SER-MCNC: 5.8 UG/ML

## 2024-06-19 LAB — MANNAN BINDING LECTIN (MBL): 4090 NG/ML

## 2024-06-20 LAB
COMPLEMENT, ALTERNATE PATHWAY (AH50): 107
DEPRECATED S PNEUM 1 IGG SER-MCNC: <0.1 MCG/ML
DEPRECATED S PNEUM12 AB SER-ACNC: <0.1 MCG/ML
DEPRECATED S PNEUM14 AB SER-ACNC: 0.2 MCG/ML
DEPRECATED S PNEUM17 IGG SER IA-MCNC: <0.1 MCG/ML
DEPRECATED S PNEUM18 IGG SER IA-MCNC: <0.1 MCG/ML
DEPRECATED S PNEUM19 IGG SER-MCNC: 0.1 MCG/ML
DEPRECATED S PNEUM19 IGG SER-MCNC: <0.1 MCG/ML
DEPRECATED S PNEUM2 IGG SER-MCNC: 0.2 MCG/ML
DEPRECATED S PNEUM20 IGG SER-MCNC: 0.1 MCG/ML
DEPRECATED S PNEUM22 IGG SER-MCNC: 0.7 MCG/ML
DEPRECATED S PNEUM23 AB SER-ACNC: <0.1 MCG/ML
DEPRECATED S PNEUM3 AB SER-ACNC: <0.1 MCG/ML
DEPRECATED S PNEUM34 IGG SER-MCNC: <0.1 MCG/ML
DEPRECATED S PNEUM4 AB SER-ACNC: <0.1 MCG/ML
DEPRECATED S PNEUM5 IGG SER-MCNC: <0.1 MCG/ML
DEPRECATED S PNEUM6 IGG SER-MCNC: 0.2 MCG/ML
DEPRECATED S PNEUM7 IGG SER-ACNC: <0.1 MCG/ML
DEPRECATED S PNEUM8 AB SER-ACNC: 0.2 MCG/ML
DEPRECATED S PNEUM9 AB SER-ACNC: <0.1 MCG/ML
DEPRECATED S PNEUM9 IGG SER-MCNC: <0.1 MCG/ML
IMMUNOLOGIST REVIEW: NORMAL
STREPTOCOCCUS PNEUMONIAE SEROTYPE 11A: <0.1 MCG/ML
STREPTOCOCCUS PNEUMONIAE SEROTYPE 15B: 0.1 MCG/ML
STREPTOCOCCUS PNEUMONIAE SEROTYPE 33F: 0.1 MCG/ML

## 2024-07-02 LAB
25(OH)D3 SERPL-MCNC: 26.8 NG/ML
ALBUMIN SERPL ELPH-MCNC: 4.6 G/DL
ALP BLD-CCNC: 95 U/L
ALT SERPL-CCNC: 82 U/L
ANION GAP SERPL CALC-SCNC: 13 MMOL/L
AST SERPL-CCNC: 68 U/L
BILIRUB SERPL-MCNC: 0.9 MG/DL
BUN SERPL-MCNC: 9 MG/DL
CALCIUM SERPL-MCNC: 9.7 MG/DL
CHLORIDE SERPL-SCNC: 104 MMOL/L
CHOLEST SERPL-MCNC: 190 MG/DL
CO2 SERPL-SCNC: 26 MMOL/L
CREAT SERPL-MCNC: 0.68 MG/DL
CREAT SPEC-SCNC: 144 MG/DL
EGFR: 103 ML/MIN/1.73M2
ESTIMATED AVERAGE GLUCOSE: 103 MG/DL
GLUCOSE SERPL-MCNC: 129 MG/DL
HBA1C MFR BLD HPLC: 5.2 %
HDLC SERPL-MCNC: 43 MG/DL
LDLC SERPL CALC-MCNC: 121 MG/DL
MICROALBUMIN 24H UR DL<=1MG/L-MCNC: 1.7 MG/DL
MICROALBUMIN/CREAT 24H UR-RTO: 12 MG/G
NONHDLC SERPL-MCNC: 147 MG/DL
POTASSIUM SERPL-SCNC: 4.5 MMOL/L
PROT SERPL-MCNC: 6.7 G/DL
SODIUM SERPL-SCNC: 143 MMOL/L
TRIGL SERPL-MCNC: 142 MG/DL
TSH SERPL-ACNC: 0.9 UIU/ML

## 2024-07-03 ENCOUNTER — APPOINTMENT (OUTPATIENT)
Dept: ENDOCRINOLOGY | Facility: CLINIC | Age: 54
End: 2024-07-03

## 2024-07-03 VITALS
DIASTOLIC BLOOD PRESSURE: 71 MMHG | SYSTOLIC BLOOD PRESSURE: 101 MMHG | WEIGHT: 214 LBS | HEART RATE: 101 BPM | OXYGEN SATURATION: 98 % | HEIGHT: 64 IN | TEMPERATURE: 97.6 F | BODY MASS INDEX: 36.54 KG/M2

## 2024-07-03 PROCEDURE — 95251 CONT GLUC MNTR ANALYSIS I&R: CPT

## 2024-07-03 PROCEDURE — 99202 OFFICE O/P NEW SF 15 MIN: CPT | Mod: 25

## 2024-07-08 ENCOUNTER — RX RENEWAL (OUTPATIENT)
Age: 54
End: 2024-07-08

## 2024-07-17 ENCOUNTER — APPOINTMENT (OUTPATIENT)
Dept: INTERNAL MEDICINE | Facility: CLINIC | Age: 54
End: 2024-07-17

## 2024-07-17 VITALS
WEIGHT: 214 LBS | BODY MASS INDEX: 36.54 KG/M2 | OXYGEN SATURATION: 96 % | SYSTOLIC BLOOD PRESSURE: 109 MMHG | TEMPERATURE: 97.4 F | HEART RATE: 104 BPM | HEIGHT: 64 IN | DIASTOLIC BLOOD PRESSURE: 68 MMHG

## 2024-07-17 VITALS — HEART RATE: 96 BPM

## 2024-07-17 DIAGNOSIS — E66.9 OBESITY, UNSPECIFIED: ICD-10-CM

## 2024-07-17 DIAGNOSIS — E78.5 HYPERLIPIDEMIA, UNSPECIFIED: ICD-10-CM

## 2024-07-17 DIAGNOSIS — D80.1 NONFAMILIAL HYPOGAMMAGLOBULINEMIA: ICD-10-CM

## 2024-07-17 DIAGNOSIS — C50.919 MALIGNANT NEOPLASM OF UNSPECIFIED SITE OF UNSPECIFIED FEMALE BREAST: ICD-10-CM

## 2024-07-17 DIAGNOSIS — E11.65 TYPE 2 DIABETES MELLITUS WITH HYPERGLYCEMIA: ICD-10-CM

## 2024-07-17 DIAGNOSIS — M79.7 FIBROMYALGIA: ICD-10-CM

## 2024-07-17 DIAGNOSIS — M19.90 UNSPECIFIED OSTEOARTHRITIS, UNSPECIFIED SITE: ICD-10-CM

## 2024-07-17 DIAGNOSIS — E55.9 VITAMIN D DEFICIENCY, UNSPECIFIED: ICD-10-CM

## 2024-07-17 LAB — CYTOLOGY CVX/VAG DOC THIN PREP: NORMAL

## 2024-07-17 PROCEDURE — G2211 COMPLEX E/M VISIT ADD ON: CPT

## 2024-07-17 PROCEDURE — 99214 OFFICE O/P EST MOD 30 MIN: CPT

## 2024-07-19 NOTE — PHYSICAL THERAPY INITIAL EVALUATION ADULT - PHYSICAL ASSIST/NONPHYSICAL ASSIST: SIT/STAND, REHAB EVAL
46 yo woman who uses drugs and has complicated endocarditis  - left AMA with 30 days of abx remaining, now readmitted after using drugs again  Would continue cefazolin given history of MSSA  - f/u repeat cultures  - would re-image cervical spine to evaluate diskitis  - unfortunately, patient is her own worst enemy   1 person assist/verbal cues

## 2024-07-25 ENCOUNTER — RX RENEWAL (OUTPATIENT)
Age: 54
End: 2024-07-25

## 2024-07-26 ENCOUNTER — APPOINTMENT (OUTPATIENT)
Dept: PEDIATRIC ALLERGY IMMUNOLOGY | Facility: CLINIC | Age: 54
End: 2024-07-26
Payer: MEDICARE

## 2024-07-26 VITALS
BODY MASS INDEX: 36.56 KG/M2 | HEIGHT: 64 IN | OXYGEN SATURATION: 96 % | HEART RATE: 108 BPM | DIASTOLIC BLOOD PRESSURE: 85 MMHG | WEIGHT: 214.13 LBS | SYSTOLIC BLOOD PRESSURE: 118 MMHG

## 2024-07-26 DIAGNOSIS — R76.8 OTHER SPECIFIED ABNORMAL IMMUNOLOGICAL FINDINGS IN SERUM: ICD-10-CM

## 2024-07-26 DIAGNOSIS — Z13.0 ENCOUNTER FOR SCREENING FOR OTHER SUSPECTED ENDOCRINE DISORDER: ICD-10-CM

## 2024-07-26 DIAGNOSIS — Z13.228 ENCOUNTER FOR SCREENING FOR OTHER SUSPECTED ENDOCRINE DISORDER: ICD-10-CM

## 2024-07-26 DIAGNOSIS — L40.50 ARTHROPATHIC PSORIASIS, UNSPECIFIED: ICD-10-CM

## 2024-07-26 DIAGNOSIS — Z85.3 PERSONAL HISTORY OF MALIGNANT NEOPLASM OF BREAST: ICD-10-CM

## 2024-07-26 DIAGNOSIS — Z13.29 ENCOUNTER FOR SCREENING FOR OTHER SUSPECTED ENDOCRINE DISORDER: ICD-10-CM

## 2024-07-26 PROCEDURE — 99213 OFFICE O/P EST LOW 20 MIN: CPT

## 2024-07-26 NOTE — CONSULT LETTER
[Dear  ___] : Dear  [unfilled], [Courtesy Letter:] : I had the pleasure of seeing your patient, [unfilled], in my office today. [Please see my note below.] : Please see my note below. [Sincerely,] : Sincerely, [FreeTextEntry3] : Lita Bonner MD  of Pediatrics and Medicine  [facility] [employer] [facility]  Attending Physician Division of Allergy/Immunology [facility]

## 2024-07-26 NOTE — HISTORY OF PRESENT ILLNESS
[de-identified] :   The Last Visit: Ms. Hughes is a 53 y/o F with hypogammaglobulinemia, who used to be followed by Dr. Loaiza referred to our Immunology Clinic by her Rheum.   She states that she has gotten sick in the last 3 months: She p/w viral cold, cough, sinus pressure, fatigue- tested negative for COVID as per the patient- not tested for serology Had COVID-19 in 2021   She would like to know if her immune system is OK.  Denies pneumonia, recurrent sinusitis, sepsis, skin warts, chronic diarrhea, and chronic LAPs.     She was seen by an [facility] Immunologist- 7 years ago- and recommended to start therapy with SQIG- performed at home- had 3 injections - and she stopped.   She also has a history of metastatic breast cancer in 2000- chemo and right mastectomy-- 2009 relapsed with bone metastases - ER+  s/p  2009- RT and she is on letrozole since then- tested negative for genetic testing- underwent bilateral oophorectomy in 2014 -laparoscopic-   Since then her psoriasis has advanced- skin and joint psoriasis- she was started on 2001-    COVID vaccine- 3 weeks later- spasms- mid-lungs/back- seen by Rheum- fibromyalgia vs arthritis- started on biologics- now she is on cosentyx- 300 mg SC every 4 weeks with good response.    IMM; UTD- except COVID boosters- no flu or pneumonia vaccines Meds: Cosentyx, letrozole, duloxetine, majaro once week  All; Penicillin, nickel, latex   Family HX; Her father with pancreatic cancer- 2001- doing OK  No history of breast cancer- lymphoma  She has a 51 y/o brother known to be healthy   Her last visit with amanda Loaiza was from July 2022- copied here: cellular immune evaluation: normal CBC w/ diff normal T, B, NK cell counts normal lymphocyte proliferation to mitogens and antigens IMPRESSION: -cellular immune evaluation to date shows no obvious numerical defects. The normal lymphocyte proliferation studies suggest relatively intact T & B cell function in a test tube. Suspicion for a cellular immunodeficiency is low at this time. antibody immune evaluation: low IgG2, IgA, IgM, lambda light chain with low normal total IgG, normal IgG1, IgG3, IgG4, kappa light chain non protective vaccine levels to mumps, diphtheria, pneumococcus protective vaccine levels to measles, rubella, varicella, tetanus IMPRESSION: -antibody immune evaluation to date shows low IgG2, IgA, IgM, lambda light chain, with low normal IgG of unclear clinical significance and etiology. Recommend rechecking in 3-6 months to monitor. She also has non protective titers to mumps & diphtheria which may be due to naturally waning immunity. anticipatory guidance reviewed. The negative vaccine levels to pneumococcus (bacteria causing pneumonia) isn't surprising since you are unlikely to have been vaccinated with pneumonia vaccine due to age (vaccine not around when you were a child, not recommended until you're at least 65 years old). Recommend booster with TDAP and MMR then checking titers 4-6 weeks later to document response. At that time, also recheck IgG subsets, IgG, IgA, IgM. if you do not respond adequately to vaccination and/or IgG2 remains low, you may benefit from IgG (aka antibody) replacement therapy. Given your prior history of adverse reaction, subcutaenous route with conservative dose of 400 - 500 mg/kg/month would be a reasonable starting point. Since  you think you had adverse reaction to "Hyqvia" but is unsure, out of abundance of caution, would avoid Hyqvia and other [employer]akeda products (e.g., Cuvitru). Consider weekly or every other weekly SCIG with 20% IgG products from other manufacturers such as Hizentra or Xembify. Otherwise, you has protective titers to a variety of live and inactivated vaccines. Note that the  clinical utility of IgG replacement therapy in the absence of frequent or recurrent infection sis unclear.      complement evaluation  elevated CH50 with normal AH50, MBL  IMPRESSION  -complement evaluation shows elevated CH50 which is likely related to underlying autoimmunity. Recommend rechecking in 3-6 months to monitor. The normal AH50 and MBL are reassuring. Suspicion for complement defect is low at this time.      In summary, pls seriously consider getting the booster vaccinations as recommended, followed by blood work outlined above 4-6 weeks after the vaccination. If booster vaccination doesn't increase your levels into the protective range and/or your IgG2 remains low, you should think about IgG replacement therapy. However, if you are not experiencing frequent or severe infections, IgG replacement may not be all that helpful for you - in other words, don't do the infusions just to address an abnormal lab result.

## 2024-07-27 PROBLEM — Z86.39 HISTORY OF DIABETES MELLITUS: Status: ACTIVE | Noted: 2024-07-27

## 2024-07-27 PROBLEM — Z86.39 HISTORY OF DIABETES MELLITUS: Status: RESOLVED | Noted: 2022-09-01 | Resolved: 2024-07-27

## 2024-08-27 ENCOUNTER — APPOINTMENT (OUTPATIENT)
Dept: PEDIATRIC ALLERGY IMMUNOLOGY | Facility: CLINIC | Age: 54
End: 2024-08-27

## 2024-08-27 VITALS
HEART RATE: 87 BPM | HEIGHT: 64 IN | SYSTOLIC BLOOD PRESSURE: 111 MMHG | DIASTOLIC BLOOD PRESSURE: 76 MMHG | BODY MASS INDEX: 36.54 KG/M2 | OXYGEN SATURATION: 95 % | WEIGHT: 214 LBS

## 2024-08-27 DIAGNOSIS — Z13.0 ENCOUNTER FOR SCREENING FOR OTHER SUSPECTED ENDOCRINE DISORDER: ICD-10-CM

## 2024-08-27 DIAGNOSIS — R76.8 OTHER SPECIFIED ABNORMAL IMMUNOLOGICAL FINDINGS IN SERUM: ICD-10-CM

## 2024-08-27 DIAGNOSIS — Z13.228 ENCOUNTER FOR SCREENING FOR OTHER SUSPECTED ENDOCRINE DISORDER: ICD-10-CM

## 2024-08-27 DIAGNOSIS — Z13.29 ENCOUNTER FOR SCREENING FOR OTHER SUSPECTED ENDOCRINE DISORDER: ICD-10-CM

## 2024-08-27 PROCEDURE — G0009: CPT | Mod: 59

## 2024-08-27 PROCEDURE — 90677 PCV20 VACCINE IM: CPT

## 2024-08-27 PROCEDURE — 90471 IMMUNIZATION ADMIN: CPT

## 2024-08-27 PROCEDURE — 99213 OFFICE O/P EST LOW 20 MIN: CPT | Mod: 25

## 2024-08-27 PROCEDURE — 90707 MMR VACCINE SC: CPT

## 2024-08-27 NOTE — HISTORY OF PRESENT ILLNESS
[de-identified] : Ms. Hughes is a 53 y/o F with a history of metastatic breast cancer (ER+) s/p chemo, RT, who has a history of low IgG2 and IgA levels and psoriatic arthritis on Cosentyx, presenting with her mother for vaccine administrations.  Interval History: She has been doing Ok since her last visit.  Denies any new events or infections.   The Last Visit: Ms. Hughes is a 53 y/o F with hypogammaglobulinemia, who used to be followed by Dr. Loaiza referred to our Immunology Clinic by her Rheum.   She states that she has gotten sick in the last 3 months: She p/w viral cold, cough, sinus pressure, fatigue- tested negative for COVID as per the patient- not tested for serology Had COVID-19 in 2021   She would like to know if her immune system is OK.  Denies pneumonia, recurrent sinusitis, sepsis, skin warts, chronic diarrhea, and chronic LAPs.     She was seen by an [facility] Immunologist- 7 years ago- and recommended to start therapy with SQIG- performed at home- had 3 injections - and she stopped.   She also has a history of metastatic breast cancer in 2000- chemo and right mastectomy-- 2009 relapsed with bone metastases - ER+  s/p  2009- RT and she is on letrozole since then- tested negative for genetic testing- underwent bilateral oophorectomy in 2014 -laparoscopic-   Since then her psoriasis has advanced- skin and joint psoriasis- she was started on 2001-    COVID vaccine- 3 weeks later- spasms- mid-lungs/back- seen by Rheum- fibromyalgia vs arthritis- started on biologics- now she is on cosentyx- 300 mg SC every 4 weeks with good response.    IMM; UTD- except COVID boosters- no flu or pneumonia vaccines Meds: Cosentyx, letrozole, duloxetine, majaro once week  All; Penicillin, nickel, latex   Family HX; Her father with pancreatic cancer- 2001- doing OK  No history of breast cancer- lymphoma  She has a 53 y/o brother known to be healthy   Her last visit with amanda Loaiza was from July 2022- copied here: cellular immune evaluation: normal CBC w/ diff normal T, B, NK cell counts normal lymphocyte proliferation to mitogens and antigens IMPRESSION: -cellular immune evaluation to date shows no obvious numerical defects. The normal lymphocyte proliferation studies suggest relatively intact T & B cell function in a test tube. Suspicion for a cellular immunodeficiency is low at this time. antibody immune evaluation: low IgG2, IgA, IgM, lambda light chain with low normal total IgG, normal IgG1, IgG3, IgG4, kappa light chain non protective vaccine levels to mumps, diphtheria, pneumococcus protective vaccine levels to measles, rubella, varicella, tetanus IMPRESSION: -antibody immune evaluation to date shows low IgG2, IgA, IgM, lambda light chain, with low normal IgG of unclear clinical significance and etiology. Recommend rechecking in 3-6 months to monitor. She also has non protective titers to mumps & diphtheria which may be due to naturally waning immunity. anticipatory guidance reviewed. The negative vaccine levels to pneumococcus (bacteria causing pneumonia) isn't surprising since you are unlikely to have been vaccinated with pneumonia vaccine due to age (vaccine not around when you were a child, not recommended until you're at least 65 years old). Recommend booster with TDAP and MMR then checking titers 4-6 weeks later to document response. At that time, also recheck IgG subsets, IgG, IgA, IgM. if you do not respond adequately to vaccination and/or IgG2 remains low, you may benefit from IgG (aka antibody) replacement therapy. Given your prior history of adverse reaction, subcutaenous route with conservative dose of 400 - 500 mg/kg/month would be a reasonable starting point. Since  you think you had adverse reaction to "Hyqvia" but is unsure, out of abundance of caution, would avoid Hyqvia and other [employer]aked[employer] products (e.g., Cuvitru). Consider weekly or every other weekly SCIG with 20% IgG products from other manufacturers such as Hizentra or Xembify. Otherwise, you has protective titers to a variety of live and inactivated vaccines. Note that the  clinical utility of IgG replacement therapy in the absence of frequent or recurrent infection sis unclear.      complement evaluation  elevated CH50 with normal AH50, MBL  IMPRESSION  -complement evaluation shows elevated CH50 which is likely related to underlying autoimmunity. Recommend rechecking in 3-6 months to monitor. The normal AH50 and MBL are reassuring. Suspicion for complement defect is low at this time.      In summary, pls seriously consider getting the booster vaccinations as recommended, followed by blood work outlined above 4-6 weeks after the vaccination. If booster vaccination doesn't increase your levels into the protective range and/or your IgG2 remains low, you should think about IgG replacement therapy. However, if you are not experiencing frequent or severe infections, IgG replacement may not be all that helpful for you - in other words, don't do the infusions just to address an abnormal lab result.

## 2024-09-17 ENCOUNTER — APPOINTMENT (OUTPATIENT)
Dept: RHEUMATOLOGY | Facility: CLINIC | Age: 54
End: 2024-09-17
Payer: MEDICARE

## 2024-09-17 VITALS
DIASTOLIC BLOOD PRESSURE: 78 MMHG | OXYGEN SATURATION: 96 % | HEART RATE: 101 BPM | RESPIRATION RATE: 15 BRPM | SYSTOLIC BLOOD PRESSURE: 129 MMHG | WEIGHT: 214 LBS | HEIGHT: 64 IN | BODY MASS INDEX: 36.54 KG/M2

## 2024-09-17 PROCEDURE — 99214 OFFICE O/P EST MOD 30 MIN: CPT

## 2024-09-17 NOTE — HISTORY OF PRESENT ILLNESS
[___ Month(s) Ago] : [unfilled] month(s) ago [FreeTextEntry1] : On the higher dose of Cosentyx. Had COVID a few months ago and ever since then feels that her low back pain has exacerbated. Has been using patched and Tylenol. Recent PET/CT LAYLA for cancer. There was mention of diffuse uptake in the shoulder and hip either secondary to degenerative disease or inflammation.

## 2024-09-17 NOTE — PHYSICAL EXAM
[General Appearance - Alert] : alert [General Appearance - In No Acute Distress] : in no acute distress [General Appearance - Well Nourished] : well nourished [General Appearance - Well Developed] : well developed [Sclera] : the sclera and conjunctiva were normal [PERRL With Normal Accommodation] : pupils were equal in size, round, and reactive to light [Examination Of The Oral Cavity] : the lips and gums were normal [Oropharynx] : the oropharynx was normal [] : no respiratory distress [Respiration, Rhythm And Depth] : normal respiratory rhythm and effort [Auscultation Breath Sounds / Voice Sounds] : lungs were clear to auscultation bilaterally [Heart Rate And Rhythm] : heart rate was normal and rhythm regular [Heart Sounds] : normal S1 and S2 [Edema] : there was no peripheral edema [Bowel Sounds] : normal bowel sounds [Abdomen Soft] : soft [FreeTextEntry1] : no swelling/warmth/erythema of the joitns of the UE/LE

## 2024-09-17 NOTE — ASSESSMENT
[FreeTextEntry1] : 54 year old female with psoriasis and PsA  1. PsA: Axial involvement per imaging (PET/CT) and has active skin disease and erosive arthritis. much improved on Cosentyx but still with persistent skin and joint/tendon symptoms. No signs of synovitis on exam. FM possibly confounding. Symptoms have been stable on Cosentyx 300mg/month thus discussed continuing the same for now. Trial of Tizanidine 2 mg q8h PRN for chronic back pain.    2. Abnormal LFTs: f/u hepatology.   3. Psoriasis: skin lesions have resolved   Follow up in 2 months.

## 2024-09-18 ENCOUNTER — TRANSCRIPTION ENCOUNTER (OUTPATIENT)
Age: 54
End: 2024-09-18

## 2024-09-23 ENCOUNTER — TRANSCRIPTION ENCOUNTER (OUTPATIENT)
Age: 54
End: 2024-09-23

## 2024-09-24 ENCOUNTER — TRANSCRIPTION ENCOUNTER (OUTPATIENT)
Age: 54
End: 2024-09-24

## 2024-09-25 ENCOUNTER — NON-APPOINTMENT (OUTPATIENT)
Age: 54
End: 2024-09-25

## 2024-09-25 ENCOUNTER — APPOINTMENT (OUTPATIENT)
Dept: OPHTHALMOLOGY | Facility: CLINIC | Age: 54
End: 2024-09-25
Payer: MEDICARE

## 2024-09-25 PROCEDURE — 92004 COMPRE OPH EXAM NEW PT 1/>: CPT

## 2024-10-04 ENCOUNTER — APPOINTMENT (OUTPATIENT)
Dept: PEDIATRIC ALLERGY IMMUNOLOGY | Facility: CLINIC | Age: 54
End: 2024-10-04

## 2024-10-04 ENCOUNTER — LABORATORY RESULT (OUTPATIENT)
Age: 54
End: 2024-10-04

## 2024-10-08 ENCOUNTER — RX RENEWAL (OUTPATIENT)
Age: 54
End: 2024-10-08

## 2024-10-08 RX ORDER — TIRZEPATIDE 7.5 MG/.5ML
7.5 INJECTION, SOLUTION SUBCUTANEOUS
Qty: 1 | Refills: 2 | Status: ACTIVE | COMMUNITY
Start: 2024-10-08 | End: 1900-01-01

## 2024-10-30 ENCOUNTER — TRANSCRIPTION ENCOUNTER (OUTPATIENT)
Age: 54
End: 2024-10-30

## 2024-10-31 ENCOUNTER — TRANSCRIPTION ENCOUNTER (OUTPATIENT)
Age: 54
End: 2024-10-31

## 2024-11-21 ENCOUNTER — TRANSCRIPTION ENCOUNTER (OUTPATIENT)
Age: 54
End: 2024-11-21

## 2024-11-23 ENCOUNTER — LABORATORY RESULT (OUTPATIENT)
Age: 54
End: 2024-11-23

## 2024-11-25 ENCOUNTER — TRANSCRIPTION ENCOUNTER (OUTPATIENT)
Age: 54
End: 2024-11-25

## 2024-11-25 LAB
ALBUMIN SERPL ELPH-MCNC: 4.7 G/DL
ALP BLD-CCNC: 99 U/L
ALT SERPL-CCNC: 142 U/L
ANION GAP SERPL CALC-SCNC: 11 MMOL/L
APPEARANCE: CLEAR
AST SERPL-CCNC: 108 U/L
BASOPHILS # BLD AUTO: 0.06 K/UL
BASOPHILS NFR BLD AUTO: 0.9 %
BILIRUB SERPL-MCNC: 1 MG/DL
BILIRUBIN URINE: ABNORMAL
BLOOD URINE: NEGATIVE
BUN SERPL-MCNC: 10 MG/DL
CALCIUM SERPL-MCNC: 9.9 MG/DL
CHLORIDE SERPL-SCNC: 104 MMOL/L
CK SERPL-CCNC: 65 U/L
CO2 SERPL-SCNC: 28 MMOL/L
COLOR: NORMAL
CREAT SERPL-MCNC: 0.68 MG/DL
CREAT SPEC-SCNC: 252 MG/DL
CREAT/PROT UR: 0.1 RATIO
CRP SERPL-MCNC: 6 MG/L
EGFR: 103 ML/MIN/1.73M2
EOSINOPHIL # BLD AUTO: 0.17 K/UL
EOSINOPHIL NFR BLD AUTO: 2.6 %
ERYTHROCYTE [SEDIMENTATION RATE] IN BLOOD BY WESTERGREN METHOD: 20 MM/HR
GLUCOSE QUALITATIVE U: NEGATIVE MG/DL
GLUCOSE SERPL-MCNC: 93 MG/DL
HCT VFR BLD CALC: 44.5 %
HGB BLD-MCNC: 14.9 G/DL
IMM GRANULOCYTES NFR BLD AUTO: 0.3 %
KETONES URINE: NEGATIVE MG/DL
LEUKOCYTE ESTERASE URINE: NEGATIVE
LYMPHOCYTES # BLD AUTO: 2.08 K/UL
LYMPHOCYTES NFR BLD AUTO: 31.2 %
MAN DIFF?: NORMAL
MCHC RBC-ENTMCNC: 29.5 PG
MCHC RBC-ENTMCNC: 33.5 G/DL
MCV RBC AUTO: 88.1 FL
MONOCYTES # BLD AUTO: 0.47 K/UL
MONOCYTES NFR BLD AUTO: 7.1 %
NEUTROPHILS # BLD AUTO: 3.86 K/UL
NEUTROPHILS NFR BLD AUTO: 57.9 %
NITRITE URINE: NEGATIVE
PH URINE: 6.5
PLATELET # BLD AUTO: 262 K/UL
POTASSIUM SERPL-SCNC: 4.2 MMOL/L
PROT SERPL-MCNC: 6.9 G/DL
PROT UR-MCNC: 23 MG/DL
PROTEIN URINE: 30 MG/DL
RBC # BLD: 5.05 M/UL
RBC # FLD: 12.4 %
SODIUM SERPL-SCNC: 143 MMOL/L
SPECIFIC GRAVITY URINE: >1.03
TSH SERPL-ACNC: 0.96 UIU/ML
UROBILINOGEN URINE: 1 MG/DL
WBC # FLD AUTO: 6.66 K/UL

## 2024-11-26 ENCOUNTER — TRANSCRIPTION ENCOUNTER (OUTPATIENT)
Age: 54
End: 2024-11-26

## 2024-11-27 ENCOUNTER — TRANSCRIPTION ENCOUNTER (OUTPATIENT)
Age: 54
End: 2024-11-27

## 2024-11-28 ENCOUNTER — TRANSCRIPTION ENCOUNTER (OUTPATIENT)
Age: 54
End: 2024-11-28

## 2024-12-11 ENCOUNTER — APPOINTMENT (OUTPATIENT)
Dept: INTERNAL MEDICINE | Facility: CLINIC | Age: 54
End: 2024-12-11
Payer: MEDICARE

## 2024-12-11 VITALS
HEIGHT: 64 IN | OXYGEN SATURATION: 98 % | HEART RATE: 115 BPM | DIASTOLIC BLOOD PRESSURE: 88 MMHG | WEIGHT: 214 LBS | SYSTOLIC BLOOD PRESSURE: 132 MMHG | BODY MASS INDEX: 36.54 KG/M2

## 2024-12-11 DIAGNOSIS — F43.9 REACTION TO SEVERE STRESS, UNSPECIFIED: ICD-10-CM

## 2024-12-11 DIAGNOSIS — F41.9 ANXIETY DISORDER, UNSPECIFIED: ICD-10-CM

## 2024-12-11 DIAGNOSIS — E66.9 OBESITY, UNSPECIFIED: ICD-10-CM

## 2024-12-11 DIAGNOSIS — E78.5 HYPERLIPIDEMIA, UNSPECIFIED: ICD-10-CM

## 2024-12-11 DIAGNOSIS — Z13.0 ENCOUNTER FOR SCREENING FOR OTHER SUSPECTED ENDOCRINE DISORDER: ICD-10-CM

## 2024-12-11 DIAGNOSIS — L40.50 ARTHROPATHIC PSORIASIS, UNSPECIFIED: ICD-10-CM

## 2024-12-11 DIAGNOSIS — T88.1XXA OTHER COMPLICATIONS FOLLOWING IMMUNIZATION, NOT ELSEWHERE CLASSIFIED, INITIAL ENCOUNTER: ICD-10-CM

## 2024-12-11 DIAGNOSIS — M79.7 FIBROMYALGIA: ICD-10-CM

## 2024-12-11 DIAGNOSIS — K76.0 FATTY (CHANGE OF) LIVER, NOT ELSEWHERE CLASSIFIED: ICD-10-CM

## 2024-12-11 DIAGNOSIS — R00.2 PALPITATIONS: ICD-10-CM

## 2024-12-11 DIAGNOSIS — Z87.898 PERSONAL HISTORY OF OTHER SPECIFIED CONDITIONS: ICD-10-CM

## 2024-12-11 DIAGNOSIS — Z11.1 ENCOUNTER FOR SCREENING FOR RESPIRATORY TUBERCULOSIS: ICD-10-CM

## 2024-12-11 DIAGNOSIS — Z86.39 PERSONAL HISTORY OF OTHER ENDOCRINE, NUTRITIONAL AND METABOLIC DISEASE: ICD-10-CM

## 2024-12-11 DIAGNOSIS — Z13.29 ENCOUNTER FOR SCREENING FOR OTHER SUSPECTED ENDOCRINE DISORDER: ICD-10-CM

## 2024-12-11 DIAGNOSIS — C50.919 MALIGNANT NEOPLASM OF UNSPECIFIED SITE OF UNSPECIFIED FEMALE BREAST: ICD-10-CM

## 2024-12-11 DIAGNOSIS — L40.9 PSORIASIS, UNSPECIFIED: ICD-10-CM

## 2024-12-11 DIAGNOSIS — Z13.228 ENCOUNTER FOR SCREENING FOR OTHER SUSPECTED ENDOCRINE DISORDER: ICD-10-CM

## 2024-12-11 DIAGNOSIS — R79.89 OTHER SPECIFIED ABNORMAL FINDINGS OF BLOOD CHEMISTRY: ICD-10-CM

## 2024-12-11 DIAGNOSIS — E55.9 VITAMIN D DEFICIENCY, UNSPECIFIED: ICD-10-CM

## 2024-12-11 DIAGNOSIS — R07.89 OTHER CHEST PAIN: ICD-10-CM

## 2024-12-11 DIAGNOSIS — Z00.00 ENCOUNTER FOR GENERAL ADULT MEDICAL EXAMINATION W/OUT ABNORMAL FINDINGS: ICD-10-CM

## 2024-12-11 DIAGNOSIS — F32.A ANXIETY DISORDER, UNSPECIFIED: ICD-10-CM

## 2024-12-11 DIAGNOSIS — Z23 ENCOUNTER FOR IMMUNIZATION: ICD-10-CM

## 2024-12-11 PROCEDURE — 93000 ELECTROCARDIOGRAM COMPLETE: CPT

## 2024-12-11 PROCEDURE — 36415 COLL VENOUS BLD VENIPUNCTURE: CPT

## 2024-12-11 PROCEDURE — 99396 PREV VISIT EST AGE 40-64: CPT

## 2024-12-13 ENCOUNTER — APPOINTMENT (OUTPATIENT)
Dept: INTERNAL MEDICINE | Facility: CLINIC | Age: 54
End: 2024-12-13
Payer: MEDICARE

## 2024-12-13 ENCOUNTER — NON-APPOINTMENT (OUTPATIENT)
Age: 54
End: 2024-12-13

## 2024-12-13 LAB
25(OH)D3 SERPL-MCNC: 24 NG/ML
ALBUMIN SERPL ELPH-MCNC: 4.5 G/DL
ALP BLD-CCNC: 100 U/L
ALT SERPL-CCNC: 99 U/L
ANION GAP SERPL CALC-SCNC: 19 MMOL/L
AST SERPL-CCNC: 72 U/L
BILIRUB SERPL-MCNC: 1 MG/DL
BUN SERPL-MCNC: 9 MG/DL
CALCIUM SERPL-MCNC: 9.6 MG/DL
CHLORIDE SERPL-SCNC: 104 MMOL/L
CHOLEST SERPL-MCNC: 183 MG/DL
CO2 SERPL-SCNC: 18 MMOL/L
CREAT SERPL-MCNC: 0.6 MG/DL
EGFR: 107 ML/MIN/1.73M2
ESTIMATED AVERAGE GLUCOSE: 111 MG/DL
GLUCOSE SERPL-MCNC: 103 MG/DL
HBA1C MFR BLD HPLC: 5.5 %
HDLC SERPL-MCNC: 41 MG/DL
LDLC SERPL CALC-MCNC: 120 MG/DL
NONHDLC SERPL-MCNC: 142 MG/DL
POTASSIUM SERPL-SCNC: 4.2 MMOL/L
PROT SERPL-MCNC: 7 G/DL
SODIUM SERPL-SCNC: 142 MMOL/L
TRIGL SERPL-MCNC: 123 MG/DL

## 2024-12-13 PROCEDURE — 90471 IMMUNIZATION ADMIN: CPT

## 2024-12-13 PROCEDURE — 90707 MMR VACCINE SC: CPT

## 2025-01-06 ENCOUNTER — APPOINTMENT (OUTPATIENT)
Dept: RHEUMATOLOGY | Facility: CLINIC | Age: 55
End: 2025-01-06
Payer: MEDICARE

## 2025-01-06 ENCOUNTER — TRANSCRIPTION ENCOUNTER (OUTPATIENT)
Age: 55
End: 2025-01-06

## 2025-01-06 PROCEDURE — 99214 OFFICE O/P EST MOD 30 MIN: CPT

## 2025-01-07 ENCOUNTER — TRANSCRIPTION ENCOUNTER (OUTPATIENT)
Age: 55
End: 2025-01-07

## 2025-01-07 RX ORDER — METAXALONE 800 MG/1
800 TABLET ORAL 3 TIMES DAILY
Qty: 90 | Refills: 0 | Status: ACTIVE | COMMUNITY
Start: 2025-01-07 | End: 1900-01-01

## 2025-01-08 ENCOUNTER — TRANSCRIPTION ENCOUNTER (OUTPATIENT)
Age: 55
End: 2025-01-08

## 2025-01-08 ENCOUNTER — APPOINTMENT (OUTPATIENT)
Dept: ENDOCRINOLOGY | Facility: CLINIC | Age: 55
End: 2025-01-08

## 2025-01-08 ENCOUNTER — APPOINTMENT (OUTPATIENT)
Dept: OBGYN | Facility: CLINIC | Age: 55
End: 2025-01-08

## 2025-01-08 VITALS
TEMPERATURE: 97.7 F | DIASTOLIC BLOOD PRESSURE: 75 MMHG | HEIGHT: 64 IN | SYSTOLIC BLOOD PRESSURE: 111 MMHG | BODY MASS INDEX: 36.54 KG/M2 | HEART RATE: 108 BPM | OXYGEN SATURATION: 96 % | WEIGHT: 214 LBS

## 2025-01-08 VITALS
HEIGHT: 64 IN | WEIGHT: 214 LBS | DIASTOLIC BLOOD PRESSURE: 75 MMHG | SYSTOLIC BLOOD PRESSURE: 111 MMHG | HEART RATE: 108 BPM | BODY MASS INDEX: 36.54 KG/M2

## 2025-01-08 DIAGNOSIS — L29.2 PRURITUS VULVAE: ICD-10-CM

## 2025-01-08 DIAGNOSIS — E11.9 TYPE 2 DIABETES MELLITUS W/OUT COMPLICATIONS: ICD-10-CM

## 2025-01-08 DIAGNOSIS — Z01.419 ENCOUNTER FOR GYNECOLOGICAL EXAMINATION (GENERAL) (ROUTINE) W/OUT ABNORMAL FINDINGS: ICD-10-CM

## 2025-01-08 DIAGNOSIS — E66.9 OBESITY, UNSPECIFIED: ICD-10-CM

## 2025-01-08 DIAGNOSIS — E07.9 DISORDER OF THYROID, UNSPECIFIED: ICD-10-CM

## 2025-01-08 PROCEDURE — 99386 PREV VISIT NEW AGE 40-64: CPT | Mod: GY

## 2025-01-08 PROCEDURE — G0101: CPT

## 2025-01-08 PROCEDURE — 99212 OFFICE O/P EST SF 10 MIN: CPT | Mod: 25

## 2025-01-08 PROCEDURE — 99214 OFFICE O/P EST MOD 30 MIN: CPT

## 2025-01-08 RX ORDER — NYSTATIN AND TRIAMCINOLONE ACETONIDE 100000; 1 [USP'U]/G; MG/G
100000-0.1 OINTMENT TOPICAL
Qty: 1 | Refills: 2 | Status: ACTIVE | COMMUNITY
Start: 2025-01-08 | End: 1900-01-01

## 2025-01-12 LAB
CYTOLOGY CVX/VAG DOC THIN PREP: ABNORMAL
HPV HIGH+LOW RISK DNA PNL CVX: NOT DETECTED

## 2025-01-13 NOTE — ASSESSMENT
[facility]   Physical Therapy Treatment Note      Name: Fabiano Jules Jr.  Clinic Number: [id number]    Therapy Diagnosis:   Encounter Diagnoses   Name Primary?    Range of motion deficit Yes    Strength loss of        Physician: Darrion Ricks,*    Visit Date: 1/13/2025      Physician Orders: PT Eval and treat   Medical Diagnosis from Referral:   Diagnosis   S46.011D (ICD-10-CM) - Traumatic complete tear of right rotator cuff, subsequent encounter      Evaluation Date: 10/23/2024  Authorization Period Expiration:     12/31/2024      Plan of Care Expiration: 1/15/2025  Progress Note Due: 11/23/2024  Visit # / Visits authorized: 11/ 20   FOTO: 3/ 3     Precautions: Standard      Time In: 500 pm  Time Out: 545 pm  Total Billable Time: 45 minutes     Date of Procedure: 9/6/2024   Post Op Day: 12 weeks and 5 days as of 12/5  Procedure: Procedure(s) (LRB):  REPAIR, ROTATOR CUFF, ARTHROSCOPIC, WITH BIOINDUCTIVE IMPLANT (Right)  EXTENSIVE DEBRIDEMENT, SHOULDER, ARTHROSCOPIC (Right)  ARTHROSCOPY, SHOULDER, WITH SUBACROMIAL SPACE DECOMPRESSION (Right)  ARTHROSCOPY,SHOULDER,WITH BICEPS TENODESIS (Right)     Surgeons and Role:     * Darrion Ricks MD - Primary    PTA Visit #: 0/5       Subjective     Patient reports: still feeling sore. New job is going well. He doesn't have to stress the shoulder too bad with it.     He was compliant with home exercise program.  Response to previous treatment: as noted  Functional change: as noted     Pain: 5/10  Location: right shoulder      Objective      Objective Measures updated at progress report unless specified.     Flexion to 165    Treatment     Fabiano received the treatments listed below:      therapeutic exercises to develop strength, endurance, ROM, and flexibility for 35 minutes including:    Upper Body Ergometer 4'/4' level 3  Prone middle trap level 2, 3 x 10  Side Lying open books x 20 Bilateral   Table step backs x 20     Wall slides  [FreeTextEntry1] : 53 year old female with psoriasis and PsA  1. PsA: Axial involvement per imaging (PET/CT) and has active skin disease and erosive arthritis. much improved on Cosentyx but still with persistent skin and joint/tendon symptoms. No signs of synovitis on exam. FM possibly confounding. No improvement with Cosentyx 300mg/month thus discussed changing therapy to an [id number] inhibitor or IL17A inhibitor but patient want to wait till her other medications are adjusted.   2. Abnormal LFTs: f/u hepatology. given improvement plan for repeat scan  3. R side back pain: MRI of the thoracic spine without any significant change. Monitor for now.   Follow up in 2 months.   with pillow case 3 x 10 repetitions red band     Biceps curl 10# x 30  Seated thoracic extension x 30  Green band Internal Rotation/External Rotation 2 x 15  Wall ball 30/30 yellow ball  Wall walk x 15    Not Performed Today:  Side Lying External Rotation x 30  Supine external rotation stretch 5 seconds holds x 3 minutes  Flexion slides on stair rail x 3 minutes  Scaption slides on stair rail x 3 minutes    manual therapy techniques: Joint mobilizations were applied to the: right shoulder for 10 minutes, including:    Inferior and posterior joint mobilizations grade II  Thoracic PA mobs grade III     Not Performed Today:  Manual pendulums followed by PROM    Patient Education and Home Exercises       Education provided:   - Home Exercise Program     Written Home Exercises Provided: Yes. Exercises were reviewed and Fabiano was able to demonstrate them prior to the end of the session.  Fabiano demonstrated good  understanding of the education provided. See Electronic Medical Record under Patient Instructions for exercises provided during therapy sessions    Assessment     Fabiano presented with continued discomfort at end range. Progressing well with strength and he does well within session. Minimal pain with exercises.     Fabiano Is progressing well towards his goals.   Patient prognosis is Good.     Patient will continue to benefit from skilled outpatient physical therapy to address the deficits listed in the problem list box on initial evaluation, provide pt/family education and to maximize pt's level of independence in the home and community environment.     Patient's spiritual, cultural and educational needs considered and pt agreeable to plan of care and goals.     Anticipated barriers to physical therapy: co-morbidities, chronic pain     Goals:     Short Term Goals (6 Weeks)  -Progressing, not met   Patient will be independent in Home Exercise Program to support improving range of motion.  Patient will have improved  Passive Range of Motion to equal the uninvolved side.  Patient will have improved Active Range of Motion to 120 of flexion.      Long Term Goals (12 Weeks) -Progressing, not met   Patient will be independent in progressed Home Exercise Program to support improving functional strength.  Patient goal: return to work and to the gym.  Patient will have improved FOTO score to predicted level to demonstrate true functional improvements.   Patient will have improved Active Range of Motion and Passive Range of Motion to equal the uninvolved side.     Plan     Progress as per POC.     Cindy Purvis, PT, DPT   Board Certified Clinical Specialist in Orthopedic Physical Therapy

## 2025-01-16 PROBLEM — E11.9 WELL CONTROLLED DIABETES MELLITUS: Status: ACTIVE | Noted: 2025-01-16

## 2025-01-20 ENCOUNTER — TRANSCRIPTION ENCOUNTER (OUTPATIENT)
Age: 55
End: 2025-01-20

## 2025-01-20 DIAGNOSIS — R31.29 OTHER MICROSCOPIC HEMATURIA: ICD-10-CM

## 2025-01-22 ENCOUNTER — TRANSCRIPTION ENCOUNTER (OUTPATIENT)
Age: 55
End: 2025-01-22

## 2025-01-30 ENCOUNTER — APPOINTMENT (OUTPATIENT)
Dept: OBGYN | Facility: CLINIC | Age: 55
End: 2025-01-30
Payer: MEDICARE

## 2025-01-30 VITALS
OXYGEN SATURATION: 100 % | DIASTOLIC BLOOD PRESSURE: 96 MMHG | RESPIRATION RATE: 16 BRPM | HEART RATE: 108 BPM | SYSTOLIC BLOOD PRESSURE: 134 MMHG | TEMPERATURE: 98.1 F

## 2025-01-30 PROCEDURE — 99211 OFF/OP EST MAY X REQ PHY/QHP: CPT

## 2025-01-31 LAB — URINE CYTOLOGY: NORMAL

## 2025-02-02 LAB
APPEARANCE: CLEAR
BACTERIA UR CULT: NORMAL
BACTERIA: NEGATIVE /HPF
BILIRUBIN URINE: NEGATIVE
BLOOD URINE: NEGATIVE
CAST: 0 /LPF
COLOR: YELLOW
EPITHELIAL CELLS: 0 /HPF
GLUCOSE QUALITATIVE U: NEGATIVE MG/DL
KETONES URINE: NEGATIVE MG/DL
LEUKOCYTE ESTERASE URINE: NEGATIVE
MICROSCOPIC-UA: NORMAL
NITRITE URINE: NEGATIVE
PH URINE: 5.5
PROTEIN URINE: NEGATIVE MG/DL
RED BLOOD CELLS URINE: 1 /HPF
SPECIFIC GRAVITY URINE: 1.02
UROBILINOGEN URINE: 0.2 MG/DL
WHITE BLOOD CELLS URINE: 0 /HPF

## 2025-04-10 ENCOUNTER — TRANSCRIPTION ENCOUNTER (OUTPATIENT)
Age: 55
End: 2025-04-10

## 2025-04-14 ENCOUNTER — APPOINTMENT (OUTPATIENT)
Dept: INTERNAL MEDICINE | Facility: CLINIC | Age: 55
End: 2025-04-14
Payer: MEDICARE

## 2025-04-14 VITALS — SYSTOLIC BLOOD PRESSURE: 117 MMHG | DIASTOLIC BLOOD PRESSURE: 79 MMHG

## 2025-04-14 VITALS — OXYGEN SATURATION: 95 % | WEIGHT: 214 LBS | HEART RATE: 110 BPM | HEIGHT: 64 IN | BODY MASS INDEX: 36.54 KG/M2

## 2025-04-14 DIAGNOSIS — E11.9 TYPE 2 DIABETES MELLITUS W/OUT COMPLICATIONS: ICD-10-CM

## 2025-04-14 DIAGNOSIS — E78.5 HYPERLIPIDEMIA, UNSPECIFIED: ICD-10-CM

## 2025-04-14 DIAGNOSIS — D80.1 NONFAMILIAL HYPOGAMMAGLOBULINEMIA: ICD-10-CM

## 2025-04-14 DIAGNOSIS — F32.A ANXIETY DISORDER, UNSPECIFIED: ICD-10-CM

## 2025-04-14 DIAGNOSIS — R07.89 OTHER CHEST PAIN: ICD-10-CM

## 2025-04-14 DIAGNOSIS — Z01.84 ENCOUNTER FOR ANTIBODY RESPONSE EXAMINATION: ICD-10-CM

## 2025-04-14 DIAGNOSIS — E07.9 DISORDER OF THYROID, UNSPECIFIED: ICD-10-CM

## 2025-04-14 DIAGNOSIS — C50.919 MALIGNANT NEOPLASM OF UNSPECIFIED SITE OF UNSPECIFIED FEMALE BREAST: ICD-10-CM

## 2025-04-14 DIAGNOSIS — K21.9 GASTRO-ESOPHAGEAL REFLUX DISEASE W/OUT ESOPHAGITIS: ICD-10-CM

## 2025-04-14 DIAGNOSIS — F41.9 ANXIETY DISORDER, UNSPECIFIED: ICD-10-CM

## 2025-04-14 DIAGNOSIS — E66.9 OBESITY, UNSPECIFIED: ICD-10-CM

## 2025-04-14 DIAGNOSIS — R79.89 OTHER SPECIFIED ABNORMAL FINDINGS OF BLOOD CHEMISTRY: ICD-10-CM

## 2025-04-14 DIAGNOSIS — L40.50 ARTHROPATHIC PSORIASIS, UNSPECIFIED: ICD-10-CM

## 2025-04-14 PROCEDURE — 99214 OFFICE O/P EST MOD 30 MIN: CPT

## 2025-04-14 PROCEDURE — G2211 COMPLEX E/M VISIT ADD ON: CPT

## 2025-04-14 PROCEDURE — 36415 COLL VENOUS BLD VENIPUNCTURE: CPT

## 2025-04-15 LAB
ALBUMIN SERPL ELPH-MCNC: 4.7 G/DL
ALP BLD-CCNC: 95 U/L
ALT SERPL-CCNC: 79 U/L
ANION GAP SERPL CALC-SCNC: 13 MMOL/L
AST SERPL-CCNC: 65 U/L
BILIRUB SERPL-MCNC: 0.8 MG/DL
BUN SERPL-MCNC: 12 MG/DL
CALCIUM SERPL-MCNC: 10.4 MG/DL
CHLORIDE SERPL-SCNC: 104 MMOL/L
CO2 SERPL-SCNC: 23 MMOL/L
CREAT SERPL-MCNC: 0.74 MG/DL
EGFRCR SERPLBLD CKD-EPI 2021: 95 ML/MIN/1.73M2
GLUCOSE SERPL-MCNC: 106 MG/DL
POTASSIUM SERPL-SCNC: 4.5 MMOL/L
PROT SERPL-MCNC: 7.1 G/DL
SODIUM SERPL-SCNC: 140 MMOL/L

## 2025-04-16 ENCOUNTER — RX RENEWAL (OUTPATIENT)
Age: 55
End: 2025-04-16

## 2025-04-16 LAB
ESTIMATED AVERAGE GLUCOSE: 111 MG/DL
HBA1C MFR BLD HPLC: 5.5 %
MUV AB SER-ACNC: NEGATIVE
MUV IGG SER QL IA: 6.22 AU/ML

## 2025-04-28 ENCOUNTER — NON-APPOINTMENT (OUTPATIENT)
Age: 55
End: 2025-04-28

## 2025-05-14 ENCOUNTER — APPOINTMENT (OUTPATIENT)
Dept: RHEUMATOLOGY | Facility: CLINIC | Age: 55
End: 2025-05-14
Payer: MEDICARE

## 2025-05-14 VITALS
WEIGHT: 214 LBS | HEART RATE: 91 BPM | SYSTOLIC BLOOD PRESSURE: 137 MMHG | HEIGHT: 64 IN | OXYGEN SATURATION: 96 % | BODY MASS INDEX: 36.54 KG/M2 | DIASTOLIC BLOOD PRESSURE: 84 MMHG

## 2025-05-14 DIAGNOSIS — L40.50 ARTHROPATHIC PSORIASIS, UNSPECIFIED: ICD-10-CM

## 2025-05-14 DIAGNOSIS — M79.7 FIBROMYALGIA: ICD-10-CM

## 2025-05-14 PROCEDURE — 99215 OFFICE O/P EST HI 40 MIN: CPT | Mod: GC

## 2025-05-14 PROCEDURE — G2211 COMPLEX E/M VISIT ADD ON: CPT

## 2025-05-14 RX ORDER — CYCLOBENZAPRINE HYDROCHLORIDE 5 MG/1
5 TABLET, FILM COATED ORAL
Qty: 15 | Refills: 0 | Status: ACTIVE | COMMUNITY
Start: 2025-05-14 | End: 1900-01-01

## 2025-05-16 ENCOUNTER — TRANSCRIPTION ENCOUNTER (OUTPATIENT)
Age: 55
End: 2025-05-16

## 2025-05-16 LAB
25(OH)D3 SERPL-MCNC: 27.6 NG/ML
ALBUMIN SERPL ELPH-MCNC: 4.7 G/DL
ALP BLD-CCNC: 97 U/L
ALT SERPL-CCNC: 89 U/L
ANION GAP SERPL CALC-SCNC: 16 MMOL/L
AST SERPL-CCNC: 71 U/L
BASOPHILS # BLD AUTO: 0.06 K/UL
BASOPHILS NFR BLD AUTO: 0.9 %
BILIRUB SERPL-MCNC: 0.8 MG/DL
BUN SERPL-MCNC: 12 MG/DL
CALCIUM SERPL-MCNC: 9.7 MG/DL
CHLORIDE SERPL-SCNC: 103 MMOL/L
CK SERPL-CCNC: 80 U/L
CO2 SERPL-SCNC: 22 MMOL/L
CREAT SERPL-MCNC: 0.65 MG/DL
CRP SERPL-MCNC: 4 MG/L
DEPRECATED KAPPA LC FREE/LAMBDA SER: 1.19 RATIO
EGFRCR SERPLBLD CKD-EPI 2021: 104 ML/MIN/1.73M2
EOSINOPHIL # BLD AUTO: 0.2 K/UL
EOSINOPHIL NFR BLD AUTO: 2.9 %
ERYTHROCYTE [SEDIMENTATION RATE] IN BLOOD BY WESTERGREN METHOD: 11 MM/HR
GLUCOSE SERPL-MCNC: 94 MG/DL
HCT VFR BLD CALC: 42.3 %
HGB BLD-MCNC: 13.9 G/DL
IGA SERPL-MCNC: 44 MG/DL
IGG SERPL-MCNC: 589 MG/DL
IGM SERPL-MCNC: 38 MG/DL
IMM GRANULOCYTES NFR BLD AUTO: 0.3 %
KAPPA LC CSF-MCNC: 0.83 MG/DL
KAPPA LC SERPL-MCNC: 0.99 MG/DL
LYMPHOCYTES # BLD AUTO: 2.26 K/UL
LYMPHOCYTES NFR BLD AUTO: 32.5 %
MAN DIFF?: NORMAL
MCHC RBC-ENTMCNC: 29.7 PG
MCHC RBC-ENTMCNC: 32.9 G/DL
MCV RBC AUTO: 90.4 FL
MONOCYTES # BLD AUTO: 0.56 K/UL
MONOCYTES NFR BLD AUTO: 8 %
NEUTROPHILS # BLD AUTO: 3.86 K/UL
NEUTROPHILS NFR BLD AUTO: 55.4 %
PLATELET # BLD AUTO: 240 K/UL
POTASSIUM SERPL-SCNC: 4.4 MMOL/L
PROT SERPL-MCNC: 6.7 G/DL
RBC # BLD: 4.68 M/UL
RBC # FLD: 12.6 %
SODIUM SERPL-SCNC: 141 MMOL/L
WBC # FLD AUTO: 6.96 K/UL

## 2025-05-19 ENCOUNTER — TRANSCRIPTION ENCOUNTER (OUTPATIENT)
Age: 55
End: 2025-05-19

## 2025-05-30 ENCOUNTER — OUTPATIENT (OUTPATIENT)
Dept: OUTPATIENT SERVICES | Facility: HOSPITAL | Age: 55
LOS: 1 days | End: 2025-05-30
Payer: MEDICARE

## 2025-05-30 ENCOUNTER — APPOINTMENT (OUTPATIENT)
Dept: ULTRASOUND IMAGING | Facility: CLINIC | Age: 55
End: 2025-05-30

## 2025-05-30 DIAGNOSIS — Z90.10 ACQUIRED ABSENCE OF UNSPECIFIED BREAST AND NIPPLE: Chronic | ICD-10-CM

## 2025-05-30 DIAGNOSIS — Z90.722 ACQUIRED ABSENCE OF OVARIES, BILATERAL: Chronic | ICD-10-CM

## 2025-05-30 DIAGNOSIS — E07.9 DISORDER OF THYROID, UNSPECIFIED: ICD-10-CM

## 2025-05-30 PROCEDURE — 76536 US EXAM OF HEAD AND NECK: CPT | Mod: 26

## 2025-05-30 PROCEDURE — 76536 US EXAM OF HEAD AND NECK: CPT

## 2025-06-04 ENCOUNTER — TRANSCRIPTION ENCOUNTER (OUTPATIENT)
Age: 55
End: 2025-06-04

## 2025-06-16 NOTE — ED ADULT NURSE NOTE - NSFALLRSKOUTCOME_ED_ALL_ED
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    Not eligible for clinical trial due to IMAE's.    Proceed to [facility].         Universal Safety Interventions

## 2025-06-25 ENCOUNTER — NON-APPOINTMENT (OUTPATIENT)
Age: 55
End: 2025-06-25

## 2025-07-17 ENCOUNTER — APPOINTMENT (OUTPATIENT)
Dept: ENDOCRINOLOGY | Facility: CLINIC | Age: 55
End: 2025-07-17

## 2025-07-17 VITALS
DIASTOLIC BLOOD PRESSURE: 80 MMHG | WEIGHT: 215 LBS | HEART RATE: 103 BPM | TEMPERATURE: 97.8 F | BODY MASS INDEX: 36.7 KG/M2 | SYSTOLIC BLOOD PRESSURE: 119 MMHG | OXYGEN SATURATION: 96 % | HEIGHT: 64 IN

## 2025-07-17 LAB
ALBUMIN SERPL ELPH-MCNC: 4.7 G/DL
ALBUMIN, RANDOM URINE: 3.9 MG/DL
ALP BLD-CCNC: 97 U/L
ALT SERPL-CCNC: 79 U/L
ANION GAP SERPL CALC-SCNC: 15 MMOL/L
AST SERPL-CCNC: 54 U/L
BASOPHILS # BLD AUTO: 0.05 K/UL
BASOPHILS NFR BLD AUTO: 0.7 %
BILIRUB SERPL-MCNC: 0.8 MG/DL
BUN SERPL-MCNC: 11 MG/DL
CALCIUM SERPL-MCNC: 9.8 MG/DL
CHLORIDE SERPL-SCNC: 104 MMOL/L
CHOLEST SERPL-MCNC: 187 MG/DL
CO2 SERPL-SCNC: 23 MMOL/L
CREAT SERPL-MCNC: 0.67 MG/DL
CREAT SPEC-SCNC: 198 MG/DL
EGFRCR SERPLBLD CKD-EPI 2021: 103 ML/MIN/1.73M2
EOSINOPHIL # BLD AUTO: 0.24 K/UL
EOSINOPHIL NFR BLD AUTO: 3.2 %
ESTIMATED AVERAGE GLUCOSE: 97 MG/DL
GLUCOSE SERPL-MCNC: 129 MG/DL
HBA1C MFR BLD HPLC: 5 %
HCT VFR BLD CALC: 45.3 %
HDLC SERPL-MCNC: 45 MG/DL
HGB BLD-MCNC: 14.4 G/DL
IMM GRANULOCYTES NFR BLD AUTO: 0.4 %
LDLC SERPL-MCNC: 114 MG/DL
LYMPHOCYTES # BLD AUTO: 2.22 K/UL
LYMPHOCYTES NFR BLD AUTO: 29.8 %
MAN DIFF?: NORMAL
MCHC RBC-ENTMCNC: 29.3 PG
MCHC RBC-ENTMCNC: 31.8 G/DL
MCV RBC AUTO: 92.1 FL
MICROALBUMIN/CREAT 24H UR-RTO: 20 MG/G
MONOCYTES # BLD AUTO: 0.52 K/UL
MONOCYTES NFR BLD AUTO: 7 %
NEUTROPHILS # BLD AUTO: 4.39 K/UL
NEUTROPHILS NFR BLD AUTO: 58.9 %
NONHDLC SERPL-MCNC: 142 MG/DL
PLATELET # BLD AUTO: 246 K/UL
POTASSIUM SERPL-SCNC: 4.2 MMOL/L
PROT SERPL-MCNC: 6.7 G/DL
RBC # BLD: 4.92 M/UL
RBC # FLD: 12.6 %
SODIUM SERPL-SCNC: 143 MMOL/L
TRIGL SERPL-MCNC: 157 MG/DL
TSH SERPL-ACNC: 1.3 UIU/ML
WBC # FLD AUTO: 7.45 K/UL

## 2025-07-17 PROCEDURE — 99214 OFFICE O/P EST MOD 30 MIN: CPT

## 2025-07-30 ENCOUNTER — APPOINTMENT (OUTPATIENT)
Dept: RHEUMATOLOGY | Facility: CLINIC | Age: 55
End: 2025-07-30

## 2025-08-06 ENCOUNTER — APPOINTMENT (OUTPATIENT)
Dept: RHEUMATOLOGY | Facility: CLINIC | Age: 55
End: 2025-08-06
Payer: MEDICARE

## 2025-08-06 PROCEDURE — 99214 OFFICE O/P EST MOD 30 MIN: CPT | Mod: 2W

## 2025-08-08 ENCOUNTER — TRANSCRIPTION ENCOUNTER (OUTPATIENT)
Age: 55
End: 2025-08-08

## 2025-08-21 ENCOUNTER — LABORATORY RESULT (OUTPATIENT)
Age: 55
End: 2025-08-21

## 2025-08-21 ENCOUNTER — APPOINTMENT (OUTPATIENT)
Dept: PEDIATRIC ALLERGY IMMUNOLOGY | Facility: CLINIC | Age: 55
End: 2025-08-21
Payer: MEDICARE

## 2025-08-21 VITALS
BODY MASS INDEX: 37.39 KG/M2 | WEIGHT: 219 LBS | HEART RATE: 108 BPM | HEIGHT: 64 IN | OXYGEN SATURATION: 95 % | DIASTOLIC BLOOD PRESSURE: 63 MMHG | SYSTOLIC BLOOD PRESSURE: 108 MMHG

## 2025-08-21 DIAGNOSIS — Z01.84 ENCOUNTER FOR ANTIBODY RESPONSE EXAMINATION: ICD-10-CM

## 2025-08-21 DIAGNOSIS — E55.9 VITAMIN D DEFICIENCY, UNSPECIFIED: ICD-10-CM

## 2025-08-21 DIAGNOSIS — R09.81 NASAL CONGESTION: ICD-10-CM

## 2025-08-21 DIAGNOSIS — J30.9 ALLERGIC RHINITIS, UNSPECIFIED: ICD-10-CM

## 2025-08-21 DIAGNOSIS — D80.1 NONFAMILIAL HYPOGAMMAGLOBULINEMIA: ICD-10-CM

## 2025-08-21 PROCEDURE — 99214 OFFICE O/P EST MOD 30 MIN: CPT | Mod: 25

## 2025-08-21 PROCEDURE — 36415 COLL VENOUS BLD VENIPUNCTURE: CPT

## 2025-08-21 RX ORDER — FLUTICASONE PROPIONATE 50 UG/1
50 SPRAY NASAL DAILY
Qty: 1 | Refills: 3 | Status: ACTIVE | COMMUNITY
Start: 2025-08-21 | End: 1900-01-01

## 2025-08-21 RX ORDER — LETROZOLE TABLETS 2.5 MG/1
2.5 TABLET, FILM COATED ORAL
Refills: 0 | Status: ACTIVE | COMMUNITY
Start: 2025-08-21

## 2025-08-25 ENCOUNTER — APPOINTMENT (OUTPATIENT)
Dept: CARDIOLOGY | Facility: CLINIC | Age: 55
End: 2025-08-25
Payer: MEDICARE

## 2025-08-25 VITALS
BODY MASS INDEX: 37.93 KG/M2 | WEIGHT: 221 LBS | DIASTOLIC BLOOD PRESSURE: 70 MMHG | SYSTOLIC BLOOD PRESSURE: 110 MMHG | OXYGEN SATURATION: 99 % | HEART RATE: 102 BPM

## 2025-08-25 DIAGNOSIS — R07.89 OTHER CHEST PAIN: ICD-10-CM

## 2025-08-25 DIAGNOSIS — K76.0 FATTY (CHANGE OF) LIVER, NOT ELSEWHERE CLASSIFIED: ICD-10-CM

## 2025-08-25 DIAGNOSIS — E78.5 HYPERLIPIDEMIA, UNSPECIFIED: ICD-10-CM

## 2025-08-25 DIAGNOSIS — R94.31 ABNORMAL ELECTROCARDIOGRAM [ECG] [EKG]: ICD-10-CM

## 2025-08-25 PROCEDURE — 93000 ELECTROCARDIOGRAM COMPLETE: CPT

## 2025-08-25 PROCEDURE — 99204 OFFICE O/P NEW MOD 45 MIN: CPT | Mod: 25

## 2025-08-25 RX ORDER — ATORVASTATIN CALCIUM 10 MG/1
10 TABLET, FILM COATED ORAL
Qty: 90 | Refills: 3 | Status: ACTIVE | COMMUNITY
Start: 2025-08-25 | End: 1900-01-01

## 2025-08-26 ENCOUNTER — NON-APPOINTMENT (OUTPATIENT)
Age: 55
End: 2025-08-26

## 2025-08-26 LAB
25(OH)D3 SERPL-MCNC: 53.9 NG/ML
A ALTERNATA IGE QN: <0.1 KUA/L
A FUMIGATUS IGE QN: <0.1 KUA/L
BERMUDA GRASS IGE QN: <0.1 KUA/L
BOXELDER IGE QN: <0.1 KUA/L
C HERBARUM IGE QN: <0.1 KUA/L
CALIF WALNUT IGE QN: <0.1 KUA/L
CAT DANDER IGE QN: 0.81 KUA/L
CMN PIGWEED IGE QN: <0.1 KUA/L
COMMON RAGWEED IGE QN: <0.1 KUA/L
COTTONWOOD IGE QN: <0.1 KUA/L
D FARINAE IGE QN: 0.14 KUA/L
D PTERONYSS IGE QN: <0.1 KUA/L
DEPRECATED A ALTERNATA IGE RAST QL: 0
DEPRECATED A FUMIGATUS IGE RAST QL: 0
DEPRECATED BERMUDA GRASS IGE RAST QL: 0
DEPRECATED BOXELDER IGE RAST QL: 0
DEPRECATED C HERBARUM IGE RAST QL: 0
DEPRECATED CALIF WALNUT POLN IGE RAST QL: 0
DEPRECATED CAT DANDER IGE RAST QL: 2
DEPRECATED COMMON PIGWEED IGE RAST QL: 0
DEPRECATED COMMON RAGWEED IGE RAST QL: 0
DEPRECATED COTTONWOOD IGE RAST QL: 0
DEPRECATED D FARINAE IGE RAST QL: NORMAL
DEPRECATED D PTERONYSS IGE RAST QL: 0
DEPRECATED DOG DANDER IGE RAST QL: 0
DEPRECATED GOOSEFOOT IGE RAST QL: 0
DEPRECATED KAPPA LC FREE/LAMBDA SER: 1.22 RATIO
DEPRECATED LONDON PLANE IGE RAST QL: 0
DEPRECATED MOUSE URINE PROT IGE RAST QL: 0
DEPRECATED MUGWORT IGE RAST QL: 0
DEPRECATED P NOTATUM IGE RAST QL: 0
DEPRECATED RED CEDAR IGE RAST QL: 0
DEPRECATED ROACH IGE RAST QL: 0
DEPRECATED SHEEP SORREL IGE RAST QL: 0
DEPRECATED SILVER BIRCH IGE RAST QL: 0
DEPRECATED TIMOTHY IGE RAST QL: 0
DEPRECATED WHITE ASH IGE RAST QL: 0
DEPRECATED WHITE ELM IGE RAST QL: 0
DEPRECATED WHITE MULBERRY IGE RAST QL: 0
DEPRECATED WHITE OAK IGE RAST QL: 0
DOG DANDER IGE QN: <0.1 KUA/L
GOOSEFOOT IGE QN: <0.1 KUA/L
IGA SERPL-MCNC: 51 MG/DL
IGG SERPL-MCNC: 555 MG/DL
IGM SERPL-MCNC: 45 MG/DL
KAPPA LC CSF-MCNC: 0.69 MG/DL
KAPPA LC SERPL-MCNC: 0.84 MG/DL
LONDON PLANE IGE QN: <0.1 KUA/L
MOUSE URINE PROT IGE QN: <0.1 KUA/L
MT JUNIPER IGE QN: <0.1 KUA/L
MUGWORT IGE QN: <0.1 KUA/L
P NOTATUM IGE QN: <0.1 KUA/L
ROACH IGE QN: <0.1 KUA/L
SHEEP SORREL IGE QN: <0.1 KUA/L
SILVER BIRCH IGE QN: <0.1 KUA/L
TIMOTHY IGE QN: <0.1 KUA/L
TOTAL IGE SMQN RAST: 5 KU/L
WHITE ASH IGE QN: <0.1 KUA/L
WHITE ELM IGE QN: <0.1 KUA/L
WHITE MULBERRY IGE QN: <0.1 KUA/L
WHITE OAK IGE QN: <0.1 KUA/L

## 2025-09-15 ENCOUNTER — APPOINTMENT (OUTPATIENT)
Dept: INTERNAL MEDICINE | Facility: CLINIC | Age: 55
End: 2025-09-15
Payer: MEDICARE

## 2025-09-15 VITALS
OXYGEN SATURATION: 96 % | DIASTOLIC BLOOD PRESSURE: 62 MMHG | SYSTOLIC BLOOD PRESSURE: 96 MMHG | HEART RATE: 105 BPM | WEIGHT: 220 LBS | BODY MASS INDEX: 37.56 KG/M2 | HEIGHT: 64 IN

## 2025-09-15 DIAGNOSIS — E66.9 OBESITY, UNSPECIFIED: ICD-10-CM

## 2025-09-15 DIAGNOSIS — M54.2 CERVICALGIA: ICD-10-CM

## 2025-09-15 DIAGNOSIS — R79.89 OTHER SPECIFIED ABNORMAL FINDINGS OF BLOOD CHEMISTRY: ICD-10-CM

## 2025-09-15 DIAGNOSIS — E11.9 TYPE 2 DIABETES MELLITUS W/OUT COMPLICATIONS: ICD-10-CM

## 2025-09-15 DIAGNOSIS — F32.A ANXIETY DISORDER, UNSPECIFIED: ICD-10-CM

## 2025-09-15 DIAGNOSIS — K76.0 FATTY (CHANGE OF) LIVER, NOT ELSEWHERE CLASSIFIED: ICD-10-CM

## 2025-09-15 DIAGNOSIS — L40.50 ARTHROPATHIC PSORIASIS, UNSPECIFIED: ICD-10-CM

## 2025-09-15 DIAGNOSIS — E78.5 HYPERLIPIDEMIA, UNSPECIFIED: ICD-10-CM

## 2025-09-15 DIAGNOSIS — F41.9 ANXIETY DISORDER, UNSPECIFIED: ICD-10-CM

## 2025-09-15 PROCEDURE — 99214 OFFICE O/P EST MOD 30 MIN: CPT

## 2025-09-15 PROCEDURE — G2211 COMPLEX E/M VISIT ADD ON: CPT

## 2025-09-18 ENCOUNTER — APPOINTMENT (OUTPATIENT)
Dept: CARDIOLOGY | Facility: CLINIC | Age: 55
End: 2025-09-18
Payer: MEDICARE

## 2025-09-18 PROCEDURE — 93015 CV STRESS TEST SUPVJ I&R: CPT

## 2025-09-18 PROCEDURE — 93306 TTE W/DOPPLER COMPLETE: CPT
